# Patient Record
Sex: MALE | Race: BLACK OR AFRICAN AMERICAN | Employment: OTHER | URBAN - METROPOLITAN AREA
[De-identification: names, ages, dates, MRNs, and addresses within clinical notes are randomized per-mention and may not be internally consistent; named-entity substitution may affect disease eponyms.]

---

## 2017-02-16 ENCOUNTER — HOSPITAL ENCOUNTER (OUTPATIENT)
Dept: LAB | Age: 62
Discharge: HOME | End: 2017-02-16
Payer: COMMERCIAL

## 2017-02-16 DIAGNOSIS — N40.1 BPH (BENIGN PROSTATIC HYPERTROPHY) WITH URINARY OBSTRUCTION: ICD-10-CM

## 2017-02-16 DIAGNOSIS — R73.03 PREDIABETES: ICD-10-CM

## 2017-02-16 DIAGNOSIS — N13.8 BPH (BENIGN PROSTATIC HYPERTROPHY) WITH URINARY OBSTRUCTION: ICD-10-CM

## 2017-02-16 DIAGNOSIS — I10 ESSENTIAL HYPERTENSION WITH GOAL BLOOD PRESSURE LESS THAN 140/90: ICD-10-CM

## 2017-02-16 LAB
ALBUMIN SERPL BCP-MCNC: 3.5 G/DL (ref 3.4–5)
ALBUMIN/GLOB SERPL: 1.1 {RATIO} (ref 0.8–1.7)
ALP SERPL-CCNC: 91 U/L (ref 45–117)
ALT SERPL-CCNC: 40 U/L (ref 16–61)
ANION GAP BLD CALC-SCNC: 8 MMOL/L (ref 3–18)
AST SERPL W P-5'-P-CCNC: 26 U/L (ref 15–37)
BILIRUB SERPL-MCNC: 0.3 MG/DL (ref 0.2–1)
BUN SERPL-MCNC: 15 MG/DL (ref 7–18)
BUN/CREAT SERPL: 13 (ref 12–20)
CALCIUM SERPL-MCNC: 9.1 MG/DL (ref 8.5–10.1)
CHLORIDE SERPL-SCNC: 107 MMOL/L (ref 100–108)
CHOLEST SERPL-MCNC: 147 MG/DL
CO2 SERPL-SCNC: 27 MMOL/L (ref 21–32)
CREAT SERPL-MCNC: 1.13 MG/DL (ref 0.6–1.3)
GLOBULIN SER CALC-MCNC: 3.3 G/DL (ref 2–4)
GLUCOSE SERPL-MCNC: 119 MG/DL (ref 74–99)
HBA1C MFR BLD: 5.7 % (ref 4.2–5.6)
HDLC SERPL-MCNC: 51 MG/DL (ref 40–60)
HDLC SERPL: 2.9 {RATIO} (ref 0–5)
LDLC SERPL CALC-MCNC: 76.6 MG/DL (ref 0–100)
LIPID PROFILE,FLP: NORMAL
POTASSIUM SERPL-SCNC: 3.9 MMOL/L (ref 3.5–5.5)
PROT SERPL-MCNC: 6.8 G/DL (ref 6.4–8.2)
PSA SERPL-MCNC: 2.5 NG/ML (ref 0–4)
SODIUM SERPL-SCNC: 142 MMOL/L (ref 136–145)
TRIGL SERPL-MCNC: 97 MG/DL (ref ?–150)
VLDLC SERPL CALC-MCNC: 19.4 MG/DL

## 2017-02-16 PROCEDURE — 84153 ASSAY OF PSA TOTAL: CPT | Performed by: INTERNAL MEDICINE

## 2017-02-16 PROCEDURE — 80053 COMPREHEN METABOLIC PANEL: CPT | Performed by: INTERNAL MEDICINE

## 2017-02-16 PROCEDURE — 80061 LIPID PANEL: CPT | Performed by: INTERNAL MEDICINE

## 2017-02-16 PROCEDURE — 83036 HEMOGLOBIN GLYCOSYLATED A1C: CPT | Performed by: INTERNAL MEDICINE

## 2017-02-16 PROCEDURE — 36415 COLL VENOUS BLD VENIPUNCTURE: CPT | Performed by: INTERNAL MEDICINE

## 2017-02-22 ENCOUNTER — OFFICE VISIT (OUTPATIENT)
Dept: INTERNAL MEDICINE CLINIC | Age: 62
End: 2017-02-22

## 2017-02-22 VITALS
OXYGEN SATURATION: 98 % | HEIGHT: 73 IN | RESPIRATION RATE: 18 BRPM | BODY MASS INDEX: 27.7 KG/M2 | WEIGHT: 209 LBS | DIASTOLIC BLOOD PRESSURE: 80 MMHG | TEMPERATURE: 98.2 F | SYSTOLIC BLOOD PRESSURE: 152 MMHG | HEART RATE: 62 BPM

## 2017-02-22 DIAGNOSIS — N13.8 BPH (BENIGN PROSTATIC HYPERTROPHY) WITH URINARY OBSTRUCTION: ICD-10-CM

## 2017-02-22 DIAGNOSIS — R73.9 ELEVATED BLOOD SUGAR: ICD-10-CM

## 2017-02-22 DIAGNOSIS — M25.511 ACUTE PAIN OF RIGHT SHOULDER: ICD-10-CM

## 2017-02-22 DIAGNOSIS — I10 ESSENTIAL HYPERTENSION: Primary | ICD-10-CM

## 2017-02-22 DIAGNOSIS — N40.1 BPH (BENIGN PROSTATIC HYPERTROPHY) WITH URINARY OBSTRUCTION: ICD-10-CM

## 2017-02-22 RX ORDER — FLUTICASONE PROPIONATE 50 MCG
SPRAY, SUSPENSION (ML) NASAL
Qty: 16 G | Refills: 4 | Status: SHIPPED | OUTPATIENT
Start: 2017-02-22 | End: 2017-08-16 | Stop reason: SDUPTHER

## 2017-02-22 NOTE — PROGRESS NOTES
Patient is in the office today for a 6 month follow up. Do you have an Advance Directive no  Do you want more information declined    1. Have you been to the ER, urgent care clinic since your last visit? Hospitalized since your last visit? No    2. Have you seen or consulted any other health care providers outside of the [address] [place] [address] since your last visit?   Include any pap smears or colon screening. [facility]

## 2017-02-22 NOTE — PATIENT INSTRUCTIONS
DASH Diet: Care Instructions  Your Care Instructions  The DASH diet is an eating plan that can help lower your blood pressure. DASH stands for Dietary Approaches to Stop Hypertension. Hypertension is high blood pressure. The DASH diet focuses on eating foods that are high in calcium, potassium, and magnesium. These nutrients can lower blood pressure. The foods that are highest in these nutrients are fruits, vegetables, low-fat dairy products, nuts, seeds, and legumes. But taking calcium, potassium, and magnesium supplements instead of eating foods that are high in those nutrients does not have the same effect. The DASH diet also includes whole grains, fish, and poultry. The DASH diet is one of several lifestyle changes your doctor may recommend to lower your high blood pressure. Your doctor may also want you to decrease the amount of sodium in your diet. Lowering sodium while following the DASH diet can lower blood pressure even further than just the DASH diet alone. Follow-up care is a key part of your treatment and safety. Be sure to make and go to all appointments, and call your doctor if you are having problems. It's also a good idea to know your test results and keep a list of the medicines you take. How can you care for yourself at home? Following the DASH diet  · Eat 4 to 5 servings of fruit each day. A serving is 1 medium-sized piece of fruit, ½ cup chopped or canned fruit, 1/4 cup dried fruit, or 4 ounces (½ cup) of fruit juice. Choose fruit more often than fruit juice. · Eat 4 to 5 servings of vegetables each day. A serving is 1 cup of lettuce or raw leafy vegetables, ½ cup of chopped or cooked vegetables, or 4 ounces (½ cup) of vegetable juice. Choose vegetables more often than vegetable juice. · Get 2 to 3 servings of low-fat and fat-free dairy each day. A serving is 8 ounces of milk, 1 cup of yogurt, or 1 ½ ounces of cheese. · Eat 6 to 8 servings of grains each day.  A serving is 1 slice of bread, 1 ounce of dry cereal, or ½ cup of cooked rice, pasta, or cooked cereal. Try to choose whole-grain products as much as possible. · Limit lean meat, poultry, and fish to 2 servings each day. A serving is 3 ounces, about the size of a deck of cards. · Eat 4 to 5 servings of nuts, seeds, and legumes (cooked dried beans, lentils, and split peas) each week. A serving is 1/3 cup of nuts, 2 tablespoons of seeds, or ½ cup of cooked beans or peas. · Limit fats and oils to 2 to 3 servings each day. A serving is 1 teaspoon of vegetable oil or 2 tablespoons of salad dressing. · Limit sweets and added sugars to 5 servings or less a week. A serving is 1 tablespoon jelly or jam, ½ cup sorbet, or 1 cup of lemonade. · Eat less than 2,300 milligrams (mg) of sodium a day. If you limit your sodium to 1,500 mg a day, you can lower your blood pressure even more. Tips for success  · Start small. Do not try to make dramatic changes to your diet all at once. You might feel that you are missing out on your favorite foods and then be more likely to not follow the plan. Make small changes, and stick with them. Once those changes become habit, add a few more changes. · Try some of the following:  ¨ Make it a goal to eat a fruit or vegetable at every meal and at snacks. This will make it easy to get the recommended amount of fruits and vegetables each day. ¨ Try yogurt topped with fruit and nuts for a snack or healthy dessert. ¨ Add lettuce, tomato, cucumber, and onion to sandwiches. ¨ Combine a ready-made pizza crust with low-fat mozzarella cheese and lots of vegetable toppings. Try using tomatoes, squash, spinach, broccoli, carrots, cauliflower, and onions. ¨ Have a variety of cut-up vegetables with a low-fat dip as an appetizer instead of chips and dip. ¨ Sprinkle sunflower seeds or chopped almonds over salads. Or try adding chopped walnuts or almonds to cooked vegetables. ¨ Try some vegetarian meals using beans and peas. Add garbanzo or kidney beans to salads. Make burritos and tacos with mashed mcrae beans or black beans. Where can you learn more? Go to http://luzmaria-bradly.info/. Enter M462 in the search box to learn more about \"DASH Diet: Care Instructions. \"  Current as of: March 23, 2016  Content Version: 11.1  © 8769-5052 [employer]. Care instructions adapted under license by [employer] (which disclaims liability or warranty for this information). If you have questions about a medical condition or this instruction, always ask your healthcare professional. [place] [address] any warranty or liability for your use of this information. Rotator Cuff: Exercises  Your Care Instructions  Here are some examples of typical rehabilitation exercises for your condition. Start each exercise slowly. Ease off the exercise if you start to have pain. Your doctor or physical therapist will tell you when you can start these exercises and which ones will work best for you. How to do the exercises  Pendulum swing    Note: If you have pain in your back, do not do this exercise. 1. Hold on to a table or the back of a chair with your good arm. Then bend forward a little and let your sore arm hang straight down. This exercise does not use the arm muscles. Rather, use your legs and your hips to create movement that makes your arm swing freely. 2. Use the movement from your hips and legs to guide the slightly swinging arm back and forth like a pendulum (or elephant trunk). Then guide it in circles that start small (about the size of a dinner plate). Make the circles a bit larger each day, as your pain allows. 3. Do this exercise for 5 minutes, 5 to 7 times each day. 4. As you have less pain, try bending over a little farther to do this exercise. This will increase the amount of movement at your shoulder. Posterior stretching exercise    1.  Hold the elbow of your injured arm with your other hand. 2. Use your hand to pull your injured arm gently up and across your body. You will feel a gentle stretch across the back of your injured shoulder. 3. Hold for at least 15 to 30 seconds. Then slowly lower your arm. 4. Repeat 2 to 4 times. Up-the-back stretch    Note: Your doctor or physical therapist may want you to wait to do this stretch until you have regained most of your range of motion and strength. You can do this stretch in different ways. Hold any of these stretches for at least 15 to 30 seconds. Repeat them 2 to 4 times. 1. Put your hand in your back pocket. Let it rest there to stretch your shoulder. 2. With your other hand, hold your injured arm (palm outward) behind your back by the wrist. Pull your arm up gently to stretch your shoulder. 3. Next, put a towel over your other shoulder. Put the hand of your injured arm behind your back. Now hold the back end of the towel. With the other hand, hold the front end of the towel in front of your body. Pull gently on the front end of the towel. This will bring your hand farther up your back to stretch your shoulder. Overhead stretch    1. Standing about an arm's length away, grasp onto a solid surface. You could use a countertop, a doorknob, or the back of a sturdy chair. 2. With your knees slightly bent, bend forward with your arms straight. Lower your upper body, and let your shoulders stretch. 3. As your shoulders are able to stretch farther, you may need to take a step or two backward. 4. Hold for at least 15 to 30 seconds. Then stand up and relax. If you had stepped back during your stretch, step forward so you can keep your hands on the solid surface. 5. Repeat 2 to 4 times. Shoulder flexion (lying down)    Note: To make a wand for this exercise, use a piece of PVC pipe or a broom handle with the broom removed. Make the wand about a foot wider than your shoulders. 1. Lie on your back, holding a wand with both hands.  Your palms should face down as you hold the wand. 2. Keeping your elbows straight, slowly raise your arms over your head. Raise them until you feel a stretch in your shoulders, upper back, and chest.  3. Hold for 15 to 30 seconds. 4. Repeat 2 to 4 times. Shoulder rotation (lying down)    Note: To make a wand for this exercise, use a piece of PVC pipe or a broom handle with the broom removed. Make the wand about a foot wider than your shoulders. 1. Lie on your back. Hold a wand with both hands with your elbows bent and palms up. 2. Keep your elbows close to your body, and move the wand across your body toward the sore arm. 3. Hold for 8 to 12 seconds. 4. Repeat 2 to 4 times. Wall climbing (to the side)    Note: Avoid any movement that is straight to your side, and be careful not to arch your back. Your arm should stay about 30 degrees to the front of your side. 1. Stand with your side to a wall so that your fingers can just touch it at an angle about 30 degrees toward the front of your body. 2. Walk the fingers of your injured arm up the wall as high as pain permits. Try not to shrug your shoulder up toward your ear as you move your arm up. 3. Hold that position for a count of at least 15 to 20.  4. Walk your fingers back down to the starting position. 5. Repeat at least 2 to 4 times. Try to reach higher each time. Wall climbing (to the front)    Note: During this stretching exercise, be careful not to arch your back. 1. Face a wall, and stand so your fingers can just touch it. 2. Keeping your shoulder down, walk the fingers of your injured arm up the wall as high as pain permits. (Don't shrug your shoulder up toward your ear.)  3. Hold your arm in that position for at least 15 to 30 seconds. 4. Slowly walk your fingers back down to where you started. 5. Repeat at least 2 to 4 times. Try to reach higher each time. Shoulder blade squeeze    1.  Stand with your arms at your sides, and squeeze your shoulder blades together. Do not raise your shoulders up as you squeeze. 2. Hold 6 seconds. 3. Repeat 8 to 12 times. Scapular exercise: Arm reach    1. Lie flat on your back. This exercise is a very slight motion that starts with your arms raised (elbows straight, arms straight). 2. From this position, reach higher toward the kerline or ceiling. Keep your elbows straight. All motion should be from your shoulder blade only. 3. Relax your arms back to where you started. 4. Repeat 8 to 12 times. Arm raise to the side    Note: During this strengthening exercise, your arm should stay about 30 degrees to the front of your side. 1. Slowly raise your injured arm to the side, with your thumb facing up. Raise your arm 60 degrees at the most (shoulder level is 90 degrees). 2. Hold the position for 3 to 5 seconds. Then lower your arm back to your side. If you need to, bring your \"good\" arm across your body and place it under the elbow as you lower your injured arm. Use your good arm to keep your injured arm from dropping down too fast.  3. Repeat 8 to 12 times. 4. When you first start out, don't hold any extra weight in your hand. As you get stronger, you may use a 1-pound to 2-pound dumbbell or a small can of food. Shoulder flexor and extensor exercise    Note: These are isometric exercises. That means you contract your muscles without actually moving. · Push forward (flex): Stand facing a wall or doorjamb, about 6 inches or less back. Hold your injured arm against your body. Make a closed fist with your thumb on top. Then gently push your hand forward into the wall with about 25% to 50% of your strength. Don't let your body move backward as you push. Hold for about 6 seconds. Relax for a few seconds. Repeat 8 to 12 times. · Push backward (extend): Stand with your back flat against a wall. Your upper arm should be against the wall, with your elbow bent 90 degrees (your hand straight ahead).  Push your elbow gently back against the wall with about 25% to 50% of your strength. Don't let your body move forward as you push. Hold for about 6 seconds. Relax for a few seconds. Repeat 8 to 12 times. Scapular exercise: Wall push-ups    Note: This exercise is best done with your fingers somewhat turned out, rather than straight up and down. 1. Stand facing a wall, about 12 inches to 18 inches away. 2. Place your hands on the wall at shoulder height. 3. Slowly bend your elbows and bring your face to the wall. Keep your back and hips straight. 4. Push back to where you started. 5. Repeat 8 to 12 times. 6. When you can do this exercise against a wall comfortably, you can try it against a counter. You can then slowly progress to the end of a couch, then to a sturdy chair, and finally to the floor. Scapular exercise: Retraction    Note: For this exercise, you will need elastic exercise material, such as surgical tubing or Thera-Band. 1. Put the band around a solid object at about waist level. (A bedpost will work well.) Each hand should hold an end of the band. 2. With your elbows at your sides and bent to 90 degrees, pull the band back. Your shoulder blades should move toward each other. Then move your arms back where you started. 3. Repeat 8 to 12 times. 4. If you have good range of motion in your shoulders, try this exercise with your arms lifted out to the sides. Keep your elbows at a 90-degree angle. Raise the elastic band up to about shoulder level. Pull the band back to move your shoulder blades toward each other. Then move your arms back where you started. Internal rotator strengthening exercise    1. Start by tying a piece of elastic exercise material to a doorknob. You can use surgical tubing or Thera-Band. 2. Stand or sit with your shoulder relaxed and your elbow bent 90 degrees. Your upper arm should rest comfortably against your side. Squeeze a rolled towel between your elbow and your body for comfort.  This will help keep your arm at your side. 3. Hold one end of the elastic band in the hand of the painful arm. 4. Slowly rotate your forearm toward your body until it touches your belly. Slowly move it back to where you started. 5. Keep your elbow and upper arm firmly tucked against the towel roll or at your side. 6. Repeat 8 to 12 times. External rotator strengthening exercise    1. Start by tying a piece of elastic exercise material to a doorknob. You can use surgical tubing or Thera-Band. (You may also hold one end of the band in each hand.)  2. Stand or sit with your shoulder relaxed and your elbow bent 90 degrees. Your upper arm should rest comfortably against your side. Squeeze a rolled towel between your elbow and your body for comfort. This will help keep your arm at your side. 3. Hold one end of the elastic band with the hand of the painful arm. 4. Start with your forearm across your belly. Slowly rotate the forearm out away from your body. Keep your elbow and upper arm tucked against the towel roll or the side of your body until you begin to feel tightness in your shoulder. Slowly move your arm back to where you started. 5. Repeat 8 to 12 times. Follow-up care is a key part of your treatment and safety. Be sure to make and go to all appointments, and call your doctor if you are having problems. It's also a good idea to know your test results and keep a list of the medicines you take. Where can you learn more? Go to http://luzmaria-bradly.info/. Enter Adriana Luciano in the search box to learn more about \"Rotator Cuff: Exercises. \"  Current as of: May 23, 2016  Content Version: 11.1  © 1233-7058 [employer]. Care instructions adapted under license by [employer] (which disclaims liability or warranty for this information).  If you have questions about a medical condition or this instruction, always ask your healthcare professional. [employer] disclaims any warranty or liability for your use of this information.

## 2017-02-22 NOTE — PROGRESS NOTES
Kinjal Mir Sr is a 64 y.o.  male and presents with Hypertension and Blood sugar problem (f/u)      SUBJECTIVE:  Pt's  BP is well controlled on on Cardizem. He denies any fatigue. He is followed by Cardiology for his Afib. Pt's BPH and LUTS are fairly well controlled on Uroxatral. Pt is followed by urology. Pt is followed by GI for his Chron's disease. Pt continues to try and cut back the sweats and carbs in his diet to improve his prediabetes     Shoulder Pain  Patient complains of right side shoulder pain. The symptoms began 1 week ago Course of symptoms since onset has been symptoms have progressed to a point and plateaued. . Pain is described as worse at night. Symptoms were incited by repetitive activity, pt does a lot of repetitive work with his hands for his job . Patient denies fever. Therapy to date includes none. Respiratory ROS: negative for - shortness of breath  Cardiovascular ROS: negative for - chest pain    Current Outpatient Prescriptions   Medication Sig    fluticasone (FLONASE) 50 mcg/actuation nasal spray INSERT TWO SPRAYS IN EACH NOSTRIL EVERY DAY    alfuzosin SR (UROXATRAL) 10 mg SR tablet TAKE ONE TABLET BY MOUTH ONCE DAILY    aspirin 81 mg chewable tablet 81 mg.    diltiazem CD (CARDIZEM CD) 240 mg ER capsule Take 240 mg by mouth daily.  FLECAINIDE ACETATE (FLECAINIDE PO) Take  by mouth.  mesalamine EC (ASACOL) 400 mg EC tablet Take 400 mg by mouth three (3) times daily. No current facility-administered medications for this visit.           OBJECTIVE:  alert, well appearing, and in no distress  Visit Vitals    /80 (BP 1 Location: Left arm, BP Patient Position: Sitting)    Pulse 62    Temp 98.2 °F (36.8 °C) (Oral)    Resp 18    Ht 6' 1\" (1.854 m)    Wt 209 lb (94.8 kg)    SpO2 98%    BMI 27.57 kg/m2      well developed and well nourished  Chest - clear to auscultation, no wheezes, rales or rhonchi, symmetric air entry  Heart - normal rate, regular rhythm, normal S1, S2, no murmurs, rubs, clicks or gallops  Extremities - peripheral pulses normal, no pedal edema, no clubbing or cyanosis    Labs:   Lab Results   Component Value Date/Time    Cholesterol, total 147 02/16/2017 09:14 AM    HDL Cholesterol 51 02/16/2017 09:14 AM    LDL, calculated 76.6 02/16/2017 09:14 AM    Triglyceride 97 02/16/2017 09:14 AM    CHOL/HDL Ratio 2.9 02/16/2017 09:14 AM     Lab Results   Component Value Date/Time    ALT (SGPT) 40 02/16/2017 09:14 AM    AST (SGOT) 26 02/16/2017 09:14 AM    Alk. phosphatase 91 02/16/2017 09:14 AM    Bilirubin, total 0.3 02/16/2017 09:14 AM     Lab Results   Component Value Date/Time    GFR est AA >60 02/16/2017 09:14 AM    GFR est non-AA >60 02/16/2017 09:14 AM    Creatinine 1.13 02/16/2017 09:14 AM    BUN 15 02/16/2017 09:14 AM    Sodium 142 02/16/2017 09:14 AM    Potassium 3.9 02/16/2017 09:14 AM    Chloride 107 02/16/2017 09:14 AM    CO2 27 02/16/2017 09:14 AM      Lab Results   Component Value Date/Time    Prostate Specific Ag 2.5 02/16/2017 09:14 AM    Prostate Specific Ag 2.5 02/18/2016 03:00 PM    Prostate Specific Ag 2.1 02/04/2015 08:53 AM    Prostate Specific Ag 2.0 01/27/2014 08:56 AM    Prostate Specific Ag 1.4 01/14/2013 09:46 AM    Prostate Specific Ag 0.9 10/23/2009 09:12 AM     Lab Results   Component Value Date/Time    Glucose 119 02/16/2017 09:14 AM      Labs:   Lab Results   Component Value Date/Time    Hemoglobin A1c 5.7 02/16/2017 09:14 AM    Hemoglobin A1c 5.9 08/17/2016 09:24 AM    Glucose 119 02/16/2017 09:14 AM    LDL, calculated 76.6 02/16/2017 09:14 AM    Creatinine 1.13 02/16/2017 09:14 AM          Assessment/Plan      ICD-10-CM ICD-9-CM    1. Essential hypertension I10 401.9 Well controlled on Cardizem LIPID PANEL      HEMOGLOBIN A1C W/O EAG   2. Elevated blood sugar R73.9 790.29 Controlled with diet currently METABOLIC PANEL, COMPREHENSIVE   3.  BPH (benign prostatic hypertrophy) with urinary obstruction N40.1 600.01 Stable on Uroxatral. Pt will f/u with Urology for rising PSA     N13.8 599.69    4. Acute pain of right shoulder M25.511 719.41 Possible rotator cuff strain. Pt given list of exercises to try. Follow-up Disposition:  Return in about 6 months (around 8/22/2017) for labs 1 week before. Reviewed plan of care. Patient has provided input and agrees with goals.

## 2017-04-10 ENCOUNTER — OFFICE VISIT (OUTPATIENT)
Dept: UROLOGY | Age: 62
End: 2017-04-10

## 2017-04-10 VITALS
DIASTOLIC BLOOD PRESSURE: 81 MMHG | TEMPERATURE: 97.8 F | WEIGHT: 206 LBS | HEIGHT: 73 IN | SYSTOLIC BLOOD PRESSURE: 153 MMHG | HEART RATE: 56 BPM | OXYGEN SATURATION: 99 % | BODY MASS INDEX: 27.3 KG/M2

## 2017-04-10 DIAGNOSIS — N40.1 BPH (BENIGN PROSTATIC HYPERTROPHY) WITH URINARY OBSTRUCTION: Primary | ICD-10-CM

## 2017-04-10 DIAGNOSIS — N13.8 BPH (BENIGN PROSTATIC HYPERTROPHY) WITH URINARY OBSTRUCTION: Primary | ICD-10-CM

## 2017-04-10 LAB
BILIRUB UR QL STRIP: NEGATIVE
GLUCOSE UR-MCNC: NEGATIVE MG/DL
KETONES P FAST UR STRIP-MCNC: NEGATIVE MG/DL
PH UR STRIP: 6 [PH] (ref 4.6–8)
PROT UR QL STRIP: NEGATIVE MG/DL
SP GR UR STRIP: 1.01 (ref 1–1.03)
UA UROBILINOGEN AMB POC: NORMAL (ref 0.2–1)
URINALYSIS CLARITY POC: CLEAR
URINALYSIS COLOR POC: YELLOW
URINE BLOOD POC: NEGATIVE
URINE LEUKOCYTES POC: NEGATIVE
URINE NITRITES POC: NEGATIVE

## 2017-04-10 RX ORDER — ALFUZOSIN HYDROCHLORIDE 10 MG/1
10 TABLET, EXTENDED RELEASE ORAL DAILY
Qty: 90 TAB | Refills: 3 | Status: SHIPPED | OUTPATIENT
Start: 2017-04-10 | End: 2018-02-07 | Stop reason: ALTCHOICE

## 2017-04-10 NOTE — PROGRESS NOTES
Mr. Tamara Oneil has a reminder for a \"due or due soon\" health maintenance. I have asked that he contact his primary care provider for follow-up on this health maintenance.

## 2017-04-10 NOTE — MR AVS SNAPSHOT
Visit Information Date & Time Provider Department Dept. Phone Encounter #  
 4/10/2017  9:15 AM Ada Skinner, [facility] [phone] Your Appointments 8/16/2017  8:10 AM  
LAB with Soheila Martini MD  
[facility] [employer] (--) Appt Note: 6 mo f/u lab [address] [address] [phone]  
  
   
 [address] [address]  
  
    
 8/23/2017  9:15 AM  
ROUTINE CARE with Soheila Martini MD  
[facility] [employer] (--) Appt Note: 6 mo f/u  
 [address] [address] [phone]  
  
   
 [address] [place] [address],[address] [address] Upcoming Health Maintenance Date Due Hepatitis C Screening 1955 DTaP/Tdap/Td series (1 - Tdap) 10/4/1976 ZOSTER VACCINE AGE 60> 10/4/2015 INFLUENZA AGE 9 TO ADULT 8/1/2016 COLONOSCOPY 11/9/2025 Allergies as of 4/10/2017  Review Complete On: 4/10/2017 By: Ada Skinner MD  
  
 Severity Noted Reaction Type Reactions Iron  05/23/2016    Nausea Only Current Immunizations  Reviewed on 12/7/2015 Name Date  
 TB Skin Test (PPD) Intradermal 12/7/2015 Not reviewed this visit You Were Diagnosed With   
  
 Codes Comments BPH (benign prostatic hypertrophy) with urinary obstruction    -  Primary ICD-10-CM: N40.1, N13.8 ICD-9-CM: 600.01, 599.69 Vitals BP Pulse Temp Height(growth percentile) Weight(growth percentile) SpO2  
 153/81 (BP 1 Location: Left arm, BP Patient Position: Sitting) (!) 56 97.8 °F (36.6 °C) 6' 1\" (1.854 m) 206 lb (93.4 kg) 99% BMI Smoking Status 27.18 kg/m2 Former Smoker Vitals History BMI and BSA Data Body Mass Index Body Surface Area  
 27.18 kg/m 2 2.19 m 2 Preferred Pharmacy Pharmacy Name Phone [facility] [address] [place] [address], [address] [place] [address] [phone] Your Updated Medication List  
  
   
This list is accurate as of: 4/10/17  9:37 AM.  Always use your most recent med list.  
  
  
  
  
 * alfuzosin SR 10 mg SR tablet Commonly known as:  UROXATRAL  
TAKE ONE TABLET BY MOUTH ONCE DAILY  
  
 * alfuzosin SR 10 mg SR tablet Commonly known as:  Juan Pih Take 1 Tab by mouth daily. Indications: SYMPTOMATIC BENIGN PROSTATIC HYPERPLASIA ASACOL 400 mg EC tablet Generic drug:  mesalamine EC Take 400 mg by mouth three (3) times daily. aspirin 81 mg chewable tablet 81 mg.  
  
 dilTIAZem  mg ER capsule Commonly known as:  CARDIZEM CD Take 240 mg by mouth daily. FLECAINIDE PO Take  by mouth. fluticasone 50 mcg/actuation nasal spray Commonly known as:  Raissa Jasso INSERT TWO SPRAYS IN EACH NOSTRIL EVERY DAY  
  
 * Notice: This list has 2 medication(s) that are the same as other medications prescribed for you. Read the directions carefully, and ask your doctor or other care provider to review them with you. Prescriptions Sent to Pharmacy Refills  
 alfuzosin SR (UROXATRAL) 10 mg SR tablet 3 Sig: Take 1 Tab by mouth daily. Indications: SYMPTOMATIC BENIGN PROSTATIC HYPERPLASIA Class: Normal  
 Pharmacy: [id number] [facility]. .,[address] [place] [address] #: [phone] Route: Oral  
  
We Performed the Following AMB POC URINALYSIS DIP STICK AUTO W/O MICRO [75262 CPT(R)] Patient Instructions [facility] Activation Thank you for requesting access to [facility]. Please follow the instructions below to securely access and download your online medical record. [facility] allows you to send messages to your doctor, view your test results, renew your prescriptions, schedule appointments, and more. How Do I Sign Up? 1. In your internet browser, go to [email or web address]. 2. Click on the First Time User? Click Here link in the Sign In box.  You will see the New Member Sign Up page. 3. Enter your [employer] Access Code exactly as it appears below. You will not need to use this code after youve completed the sign-up process. If you do not sign up before the expiration date, you must request a new code. [employer] Access Code: [email or web address] Expires: 2017  9:06 AM (This is the date your [employer] access code will ) 4. Enter the last four digits of your Social Security Number (xxxx) and Date of Birth (mm/dd/yyyy) as indicated and click Submit. You will be taken to the next sign-up page. 5. Create a [employer] ID. This will be your [employer] login ID and cannot be changed, so think of one that is secure and easy to remember. 6. Create a [employer] password. You can change your password at any time. 7. Enter your Password Reset Question and Answer. This can be used at a later time if you forget your password. 8. Enter your e-mail address. You will receive e-mail notification when new information is available in [address] 9. Click Sign Up. You can now view and download portions of your medical record. 10. Click the Download Summary menu link to download a portable copy of your medical information. Additional Information If you have questions, please visit the Frequently Asked Questions section of the [employer] website at [email or web address] Remember, [employer] is NOT to be used for urgent needs. For medical emergencies, dial 911. Benign Prostatic Hyperplasia: Care Instructions Your Care Instructions Benign prostatic hyperplasia, or BPH, is an enlarged prostate gland. The prostate is a small gland that makes some of the fluid in semen. Prostate enlargement happens to almost all men as they age. It is usually not serious. BPH does not cause prostate cancer. As the prostate gets bigger, it may partly block the flow of urine. You may have a hard time getting a urine stream started or completely stopped. BPH can cause dribbling. You may have a weak urine stream, or you may have to urinate more often than you used to, especially at night. Most men find these problems easy to manage. You do not need treatment unless your symptoms bother you a lot or you have other problems, such as bladder infections or stones. In these cases, medicines may help. Surgery is not needed unless the urine flow is blocked or the symptoms do not get better with medicine. Follow-up care is a key part of your treatment and safety. Be sure to make and go to all appointments, and call your doctor if you are having problems. It's also a good idea to know your test results and keep a list of the medicines you take. How can you care for yourself at home? · Take plenty of time to urinate. Try to relax. · Try \"double voiding. \" Urinate as much you can, relax for a few moments, and then try to urinate again. · Sit on the toilet to urinate. · Read or think of other things while you are waiting. · Turn on a faucet, or try to picture running water. Some men find that this helps get their urine flowing. · If dribbling is a problem, wash your penis daily to avoid skin irritation and infection. · Avoid caffeine and alcohol. These drinks will increase how often you need to urinate. Spread your fluid intake throughout the day. If the urge to urinate often wakes you at night, limit your fluid intake in the evening. Urinate right before you go to bed. · Many over-the-counter cold and allergy medicines can make the symptoms of BPH worse. Avoid antihistamines, decongestants, and allergy pills, if you can. Read the warnings on the package. · If you take any prescription medicines, especially tranquilizers or antidepressants, ask your doctor or pharmacist whether they can cause urination problems. There may be other medicines you can use that do not cause urinary problems. · Be safe with medicines. Take your medicines exactly as prescribed.  Call your doctor if you think you are having a problem with your medicine. When should you call for help? Call your doctor now or seek immediate medical care if: 
· You cannot urinate at all. · You have symptoms of a urinary infection. For example: ¨ You have blood or pus in your urine. ¨ You have pain in your back just below your rib cage. This is called flank pain. ¨ You have a fever, chills, or body aches. ¨ It hurts to urinate. ¨ You have groin or belly pain. Watch closely for changes in your health, and be sure to contact your doctor if: 
· It hurts when you ejaculate. · Your urinary problems get a lot worse or bother you a lot. Where can you learn more? Go to http://luzmaria-bradly.info/. Enter F271 in the search box to learn more about \"Benign Prostatic Hyperplasia: Care Instructions. \" Current as of: May 24, 2016 Content Version: 11.2 © 8208-5615 ABPathfinder. Care instructions adapted under license by [employer]ett[employer] (which disclaims liability or warranty for this information). If you have questions about a medical condition or this instruction, always ask your healthcare professional. [place] [address] any warranty or liability for your use of this information. Introducing [facility]! Nhan Palmer introduces Milk patient portal. Now you can access parts of your medical record, email your doctor's office, and request medication refills online. 1. In your internet browser, go to [email or web address] 2. Click on the First Time User? Click Here link in the Sign In box. You will see the New Member Sign Up page. 3. Enter your Milk Access Code exactly as it appears below. You will not need to use this code after youve completed the sign-up process. If you do not sign up before the expiration date, you must request a new code. · Milk Access Code: [email or web address] Expires: 7/9/2017  9:06 AM 
 
 4. Enter the last four digits of your Social Security Number (xxxx) and Date of Birth (mm/dd/yyyy) as indicated and click Submit. You will be taken to the next sign-up page. 5. Create a Wander ID. This will be your Wander login ID and cannot be changed, so think of one that is secure and easy to remember. 6. Create a Wander password. You can change your password at any time. 7. Enter your Password Reset Question and Answer. This can be used at a later time if you forget your password. 8. Enter your e-mail address. You will receive e-mail notification when new information is available in [address] 9. Click Sign Up. You can now view and download portions of your medical record. 10. Click the Download Summary menu link to download a portable copy of your medical information. If you have questions, please visit the Frequently Asked Questions section of the Wander website. Remember, Wander is NOT to be used for urgent needs. For medical emergencies, dial 911. Now available from your iPhone and Android! Please provide this summary of care documentation to your next provider. Your primary care clinician is listed as YASMANI MORALES. If you have any questions after today's visit, please call [phone].

## 2017-04-10 NOTE — PROGRESS NOTES
[facility] 64 y.o. male     Mr. Mary Reynolds seen today for follow-up symptomatic BPH responding favorably to alpha-blocker-Uroxatrol 10 mg daily  pt is voiding with a forceful stream good control nocturia 0-1 episodes per night  Patient has no complaints regarding urination at this time-left scrotal pain has not been bothersome during the past year    Patient has a history going back 30 years of recurrent left epididymitis hospitalized for 2 months at age 25 further epididymitis-10% PA disability secondary to chronic epididymitis or  Patient has experienced no symptoms improvement on alpha-blocker therapy with Uroxatral 10 mg daily      PSA 2.5  in February 2016  PSA 2.5 in February 2017        Review of Systems:    CNS: Procedure syncope headaches or dizziness no visual changes  Respiratory: No wheezing or shortness of breath  Cardiovascular:No chest pain or palpitations/hypertension  Intestinal:Chronic constipation no dyspepsia no diarrhea  Urinary: Obstructive and irritative voiding symptoms-Chronic left epididymitis  Skeletal: Chronic low back pain  Endocrine:No diabetes or thyroid disease  Other:    Allergies: Allergies   Allergen Reactions    Iron Nausea Only      Medications:    Current Outpatient Prescriptions   Medication Sig Dispense Refill    alfuzosin SR (UROXATRAL) 10 mg SR tablet Take 1 Tab by mouth daily. Indications: SYMPTOMATIC BENIGN PROSTATIC HYPERPLASIA 90 Tab 3    fluticasone (FLONASE) 50 mcg/actuation nasal spray INSERT TWO SPRAYS IN EACH NOSTRIL EVERY DAY 16 g 4    alfuzosin SR (UROXATRAL) 10 mg SR tablet TAKE ONE TABLET BY MOUTH ONCE DAILY 30 Tab 5    aspirin 81 mg chewable tablet 81 mg.      diltiazem CD (CARDIZEM CD) 240 mg ER capsule Take 240 mg by mouth daily.  FLECAINIDE ACETATE (FLECAINIDE PO) Take  by mouth.  mesalamine EC (ASACOL) 400 mg EC tablet Take 400 mg by mouth three (3) times daily.          Past Medical History:   Diagnosis Date    A-fib [facility])  BPH (benign prostatic hypertrophy) with urinary obstruction 7/16/2010    Crohn's disease ([facility] [address]     Crohn's disease of the colon 6/2/2011    Dyspnea     ED (erectile dysfunction) 5/20/2010    Hypertension       History reviewed. No pertinent surgical history. History reviewed. No pertinent family history. Physical Examination: Well-nourished mature male in no apparent distress    Prostate by WILLIAM is large rounded smooth benign consistency nontender-no induration no nodularity  No rectal masses induration or tenderness    Urinalysis: Negative dipstick/nitrite negative    PVR today 28 cc    Impression: Symptomatic BPH responding favorably to alpha-blocker treatment with Uroxatral      Plan: Uroxatrol 10 mg daily,  #90 refill ×3    rtc 1 yr PSA WILLIAM PVR      Amna Noguera MD  -electronically signed-    PLEASE NOTE:  This document has been produced using voice recognition software. Unrecognized errors in transcription may be present.

## 2017-04-10 NOTE — PATIENT INSTRUCTIONS
[facility] Activation    Thank you for requesting access to [facility]. Please follow the instructions below to securely access and download your online medical record. [facility] allows you to send messages to your doctor, view your test results, renew your prescriptions, schedule appointments, and more. How Do I Sign Up? 1. In your internet browser, go to [email or web address]. 2. Click on the First Time User? Click Here link in the Sign In box. You will see the New Member Sign Up page. 3. Enter your [facility] Access Code exactly as it appears below. You will not need to use this code after youve completed the sign-up process. If you do not sign up before the expiration date, you must request a new code. [facility] Access Code: [email or web address]  Expires: 2017  9:06 AM (This is the date your [facility] access code will )    4. Enter the last four digits of your Social Security Number (xxxx) and Date of Birth (mm/dd/yyyy) as indicated and click Submit. You will be taken to the next sign-up page. 5. Create a [facility] ID. This will be your [facility] login ID and cannot be changed, so think of one that is secure and easy to remember. 6. Create a [facility] password. You can change your password at any time. 7. Enter your Password Reset Question and Answer. This can be used at a later time if you forget your password. 8. Enter your e-mail address. You will receive e-mail notification when new information is available in [address]. 9. Click Sign Up. You can now view and download portions of your medical record. 10. Click the Download Summary menu link to download a portable copy of your medical information. Additional Information    If you have questions, please visit the Frequently Asked Questions section of the [facility] website at [email or web address] Remember, [facility] is NOT to be used for urgent needs. For medical emergencies, dial 911.            Benign Prostatic Hyperplasia: Care Instructions  Your Care Instructions    Benign prostatic hyperplasia, or BPH, is an enlarged prostate gland. The prostate is a small gland that makes some of the fluid in semen. Prostate enlargement happens to almost all men as they age. It is usually not serious. BPH does not cause prostate cancer. As the prostate gets bigger, it may partly block the flow of urine. You may have a hard time getting a urine stream started or completely stopped. BPH can cause dribbling. You may have a weak urine stream, or you may have to urinate more often than you used to, especially at night. Most men find these problems easy to manage. You do not need treatment unless your symptoms bother you a lot or you have other problems, such as bladder infections or stones. In these cases, medicines may help. Surgery is not needed unless the urine flow is blocked or the symptoms do not get better with medicine. Follow-up care is a key part of your treatment and safety. Be sure to make and go to all appointments, and call your doctor if you are having problems. It's also a good idea to know your test results and keep a list of the medicines you take. How can you care for yourself at home? · Take plenty of time to urinate. Try to relax. · Try \"double voiding. \" Urinate as much you can, relax for a few moments, and then try to urinate again. · Sit on the toilet to urinate. · Read or think of other things while you are waiting. · Turn on a faucet, or try to picture running water. Some men find that this helps get their urine flowing. · If dribbling is a problem, wash your penis daily to avoid skin irritation and infection. · Avoid caffeine and alcohol. These drinks will increase how often you need to urinate. Spread your fluid intake throughout the day. If the urge to urinate often wakes you at night, limit your fluid intake in the evening. Urinate right before you go to bed.   · Many over-the-counter cold and allergy medicines can make the symptoms of BPH worse. Avoid antihistamines, decongestants, and allergy pills, if you can. Read the warnings on the package. · If you take any prescription medicines, especially tranquilizers or antidepressants, ask your doctor or pharmacist whether they can cause urination problems. There may be other medicines you can use that do not cause urinary problems. · Be safe with medicines. Take your medicines exactly as prescribed. Call your doctor if you think you are having a problem with your medicine. When should you call for help? Call your doctor now or seek immediate medical care if:  · You cannot urinate at all. · You have symptoms of a urinary infection. For example:  ¨ You have blood or pus in your urine. ¨ You have pain in your back just below your rib cage. This is called flank pain. ¨ You have a fever, chills, or body aches. ¨ It hurts to urinate. ¨ You have groin or belly pain. Watch closely for changes in your health, and be sure to contact your doctor if:  · It hurts when you ejaculate. · Your urinary problems get a lot worse or bother you a lot. Where can you learn more? Go to http://luzmaria-bradly.info/. Enter W664 in the search box to learn more about \"Benign Prostatic Hyperplasia: Care Instructions. \"  Current as of: May 24, 2016  Content Version: 11.2  © 9527-0866 SKINNYprice. Care instructions adapted under license by [employer] Networks (which disclaims liability or warranty for this information). If you have questions about a medical condition or this instruction, always ask your healthcare professional. [place] [address] any warranty or liability for your use of this information.

## 2017-08-16 ENCOUNTER — HOSPITAL ENCOUNTER (OUTPATIENT)
Dept: LAB | Age: 62
Discharge: HOME | End: 2017-08-16
Payer: COMMERCIAL

## 2017-08-16 DIAGNOSIS — I10 ESSENTIAL HYPERTENSION: ICD-10-CM

## 2017-08-16 DIAGNOSIS — R73.9 ELEVATED BLOOD SUGAR: ICD-10-CM

## 2017-08-16 LAB
ALBUMIN SERPL-MCNC: 3.6 G/DL (ref 3.4–5)
ALBUMIN/GLOB SERPL: 1 {RATIO} (ref 0.8–1.7)
ALP SERPL-CCNC: 107 U/L (ref 45–117)
ALT SERPL-CCNC: 41 U/L (ref 16–61)
ANION GAP SERPL CALC-SCNC: 7 MMOL/L (ref 3–18)
AST SERPL-CCNC: 27 U/L (ref 15–37)
BILIRUB SERPL-MCNC: 0.3 MG/DL (ref 0.2–1)
BUN SERPL-MCNC: 10 MG/DL (ref 7–18)
BUN/CREAT SERPL: 10 (ref 12–20)
CALCIUM SERPL-MCNC: 9.6 MG/DL (ref 8.5–10.1)
CHLORIDE SERPL-SCNC: 105 MMOL/L (ref 100–108)
CHOLEST SERPL-MCNC: 153 MG/DL
CO2 SERPL-SCNC: 28 MMOL/L (ref 21–32)
CREAT SERPL-MCNC: 0.97 MG/DL (ref 0.6–1.3)
GLOBULIN SER CALC-MCNC: 3.7 G/DL (ref 2–4)
GLUCOSE SERPL-MCNC: 92 MG/DL (ref 74–99)
HBA1C MFR BLD: 6.2 % (ref 4.2–5.6)
HDLC SERPL-MCNC: 48 MG/DL (ref 40–60)
HDLC SERPL: 3.2 {RATIO} (ref 0–5)
LDLC SERPL CALC-MCNC: 72.6 MG/DL (ref 0–100)
LIPID PROFILE,FLP: ABNORMAL
POTASSIUM SERPL-SCNC: 3.9 MMOL/L (ref 3.5–5.5)
PROT SERPL-MCNC: 7.3 G/DL (ref 6.4–8.2)
SODIUM SERPL-SCNC: 140 MMOL/L (ref 136–145)
TRIGL SERPL-MCNC: 162 MG/DL (ref ?–150)
VLDLC SERPL CALC-MCNC: 32.4 MG/DL

## 2017-08-16 PROCEDURE — 80061 LIPID PANEL: CPT | Performed by: INTERNAL MEDICINE

## 2017-08-16 PROCEDURE — 83036 HEMOGLOBIN GLYCOSYLATED A1C: CPT | Performed by: INTERNAL MEDICINE

## 2017-08-16 PROCEDURE — 36415 COLL VENOUS BLD VENIPUNCTURE: CPT | Performed by: INTERNAL MEDICINE

## 2017-08-16 PROCEDURE — 80053 COMPREHEN METABOLIC PANEL: CPT | Performed by: INTERNAL MEDICINE

## 2017-08-16 RX ORDER — FLUTICASONE PROPIONATE 50 MCG
SPRAY, SUSPENSION (ML) NASAL
Qty: 16 G | Refills: 4 | Status: SHIPPED | OUTPATIENT
Start: 2017-08-16 | End: 2018-02-07 | Stop reason: SDUPTHER

## 2017-08-16 NOTE — TELEPHONE ENCOUNTER
Requested Prescriptions     Pending Prescriptions Disp Refills    fluticasone (FLONASE) 50 mcg/actuation nasal spray 16 g 4     Sig: INSERT TWO SPRAYS IN EACH NOSTRIL EVERY DAY        Request return call when processed

## 2017-08-30 ENCOUNTER — OFFICE VISIT (OUTPATIENT)
Dept: INTERNAL MEDICINE CLINIC | Age: 62
End: 2017-08-30

## 2017-08-30 VITALS
WEIGHT: 207.5 LBS | DIASTOLIC BLOOD PRESSURE: 84 MMHG | SYSTOLIC BLOOD PRESSURE: 150 MMHG | BODY MASS INDEX: 27.5 KG/M2 | HEIGHT: 73 IN | TEMPERATURE: 98.2 F | OXYGEN SATURATION: 99 % | HEART RATE: 60 BPM | RESPIRATION RATE: 16 BRPM

## 2017-08-30 DIAGNOSIS — I48.20 CHRONIC ATRIAL FIBRILLATION (HCC): ICD-10-CM

## 2017-08-30 DIAGNOSIS — R73.03 PREDIABETES: ICD-10-CM

## 2017-08-30 DIAGNOSIS — Z11.59 NEED FOR HEPATITIS C SCREENING TEST: ICD-10-CM

## 2017-08-30 DIAGNOSIS — I10 ESSENTIAL HYPERTENSION: Primary | ICD-10-CM

## 2017-08-30 RX ORDER — DILTIAZEM HYDROCHLORIDE 180 MG/1
CAPSULE, EXTENDED RELEASE ORAL
COMMUNITY
Start: 2017-08-27 | End: 2018-08-08 | Stop reason: DRUGHIGH

## 2017-08-30 RX ORDER — FLECAINIDE ACETATE 50 MG/1
50 TABLET ORAL 2 TIMES DAILY
COMMUNITY

## 2017-08-30 NOTE — PATIENT INSTRUCTIONS

## 2017-08-30 NOTE — MR AVS SNAPSHOT
Visit Information Date & Time Provider Department Dept. Phone Encounter #  
 8/30/2017  9:00 AM Zhanna Bautista MD [facility] [employer] [phone] Follow-up Instructions Return in about 6 months (around 2/28/2018) for labs 1 week before. Your Appointments 4/9/2018  9:15 AM  
ULTRASOUND with Brandon Kowalski MD  
[facility] [address] [facility]) Appt Note: PVR/PSA; .  
 [address] Harpreet A [address] [address]  
[phone] [address] [place] [id number] Upcoming Health Maintenance Date Due Hepatitis C Screening 1955 DTaP/Tdap/Td series (1 - Tdap) 10/4/1976 ZOSTER VACCINE AGE 60> 8/4/2015 INFLUENZA AGE 9 TO ADULT 8/1/2017 COLONOSCOPY 11/9/2025 Allergies as of 8/30/2017  Review Complete On: 8/30/2017 By: Zhanna Bautista MD  
  
 Severity Noted Reaction Type Reactions Iron  05/23/2016    Nausea Only Current Immunizations  Reviewed on 12/7/2015 Name Date  
 TB Skin Test (PPD) Intradermal 12/7/2015 Not reviewed this visit You Were Diagnosed With   
  
 Codes Comments Essential hypertension    -  Primary ICD-10-CM: I10 
ICD-9-CM: 401.9 Chronic atrial fibrillation (HCC)     ICD-10-CM: Q35.7 ICD-9-CM: 427.31 Prediabetes     ICD-10-CM: R73.03 
ICD-9-CM: 790.29 Need for hepatitis C screening test     ICD-10-CM: Z11.59 
ICD-9-CM: V73.89 Vitals BP Pulse Temp Resp Height(growth percentile) Weight(growth percentile) 150/84 (BP 1 Location: Left arm, BP Patient Position: Sitting) 60 98.2 °F (36.8 °C) (Oral) 16 6' 1\" (1.854 m) 207 lb 8 oz (94.1 kg) SpO2 BMI Smoking Status 99% 27.38 kg/m2 Former Smoker Vitals History BMI and BSA Data Body Mass Index Body Surface Area  
 27.38 kg/m 2 2.2 m 2 Preferred Pharmacy Pharmacy Name Phone [facility] [address], [address] [place] [address] [phone] Your Updated Medication List  
  
   
This list is accurate as of: 8/30/17  9:20 AM.  Always use your most recent med list.  
  
  
  
  
 * alfuzosin SR 10 mg SR tablet Commonly known as:  UROXATRAL  
TAKE ONE TABLET BY MOUTH ONCE DAILY  
  
 * alfuzosin SR 10 mg SR tablet Commonly known as:  Ryan Keys Take 1 Tab by mouth daily. Indications: SYMPTOMATIC BENIGN PROSTATIC HYPERPLASIA ASACOL 400 mg EC tablet Generic drug:  mesalamine EC Take 400 mg by mouth three (3) times daily. aspirin 81 mg chewable tablet 81 mg. CARTIA  mg ER capsule Generic drug:  dilTIAZem CD  
  
 flecainide 50 mg tablet Commonly known as:  TAMBOCOR 50 mg.  
  
 fluticasone 50 mcg/actuation nasal spray Commonly known as:  [place] [facility] INSERT TWO SPRAYS IN EACH NOSTRIL EVERY DAY  
  
 * Notice: This list has 2 medication(s) that are the same as other medications prescribed for you. Read the directions carefully, and ask your doctor or other care provider to review them with you. Follow-up Instructions Return in about 6 months (around 2/28/2018) for labs 1 week before. To-Do List   
 08/30/2017 Lab:  HEPATITIS C AB Patient Instructions DASH Diet: Care Instructions Your Care Instructions The DASH diet is an eating plan that can help lower your blood pressure. DASH stands for Dietary Approaches to Stop Hypertension. Hypertension is high blood pressure. The DASH diet focuses on eating foods that are high in calcium, potassium, and magnesium. These nutrients can lower blood pressure. The foods that are highest in these nutrients are fruits, vegetables, low-fat dairy products, nuts, seeds, and legumes. But taking calcium, potassium, and magnesium supplements instead of eating foods that are high in those nutrients does not have the same effect.  The DASH diet also includes whole grains, fish, and poultry. The DASH diet is one of several lifestyle changes your doctor may recommend to lower your high blood pressure. Your doctor may also want you to decrease the amount of sodium in your diet. Lowering sodium while following the DASH diet can lower blood pressure even further than just the DASH diet alone. Follow-up care is a key part of your treatment and safety. Be sure to make and go to all appointments, and call your doctor if you are having problems. It's also a good idea to know your test results and keep a list of the medicines you take. How can you care for yourself at home? Following the DASH diet · Eat 4 to 5 servings of fruit each day. A serving is 1 medium-sized piece of fruit, ½ cup chopped or canned fruit, 1/4 cup dried fruit, or 4 ounces (½ cup) of fruit juice. Choose fruit more often than fruit juice. · Eat 4 to 5 servings of vegetables each day. A serving is 1 cup of lettuce or raw leafy vegetables, ½ cup of chopped or cooked vegetables, or 4 ounces (½ cup) of vegetable juice. Choose vegetables more often than vegetable juice. · Get 2 to 3 servings of low-fat and fat-free dairy each day. A serving is 8 ounces of milk, 1 cup of yogurt, or 1 ½ ounces of cheese. · Eat 6 to 8 servings of grains each day. A serving is 1 slice of bread, 1 ounce of dry cereal, or ½ cup of cooked rice, pasta, or cooked cereal. Try to choose whole-grain products as much as possible. · Limit lean meat, poultry, and fish to 2 servings each day. A serving is 3 ounces, about the size of a deck of cards. · Eat 4 to 5 servings of nuts, seeds, and legumes (cooked dried beans, lentils, and split peas) each week. A serving is 1/3 cup of nuts, 2 tablespoons of seeds, or ½ cup of cooked beans or peas. · Limit fats and oils to 2 to 3 servings each day. A serving is 1 teaspoon of vegetable oil or 2 tablespoons of salad dressing. · Limit sweets and added sugars to 5 servings or less a week.  A serving is 1 tablespoon jelly or jam, ½ cup sorbet, or 1 cup of lemonade. · Eat less than 2,300 milligrams (mg) of sodium a day. If you limit your sodium to 1,500 mg a day, you can lower your blood pressure even more. Tips for success · Start small. Do not try to make dramatic changes to your diet all at once. You might feel that you are missing out on your favorite foods and then be more likely to not follow the plan. Make small changes, and stick with them. Once those changes become habit, add a few more changes. · Try some of the following: ¨ Make it a goal to eat a fruit or vegetable at every meal and at snacks. This will make it easy to get the recommended amount of fruits and vegetables each day. ¨ Try yogurt topped with fruit and nuts for a snack or healthy dessert. ¨ Add lettuce, tomato, cucumber, and onion to sandwiches. ¨ Combine a ready-made pizza crust with low-fat mozzarella cheese and lots of vegetable toppings. Try using tomatoes, squash, spinach, broccoli, carrots, cauliflower, and onions. ¨ Have a variety of cut-up vegetables with a low-fat dip as an appetizer instead of chips and dip. ¨ Sprinkle sunflower seeds or chopped almonds over salads. Or try adding chopped walnuts or almonds to cooked vegetables. ¨ Try some vegetarian meals using beans and peas. Add garbanzo or kidney beans to salads. Make burritos and tacos with mashed mcrae beans or black beans. Where can you learn more? Go to http://luzmaria-bradly.info/. Enter P665 in the search box to learn more about \"DASH Diet: Care Instructions. \" Current as of: April 3, 2017 Content Version: 11.3 © 1603-0559 [employer]. Care instructions adapted under license by [employer] (which disclaims liability or warranty for this information).  If you have questions about a medical condition or this instruction, always ask your healthcare professional. [employer] disclaims any warranty or liability for your use of this information. Introducing [facility] & HE[facility]! [facility] introduces [email or web address] patient portal. Now you can access parts of your medical record, email your doctor's office, and request medication refills online. 1. In your internet browser, go to [email or web address]. [email or web address] 2. Click on the First Time User? Click Here link in the Sign In box. You will see the New Member Sign Up page. 3. Enter your [email or web address] Access Code exactly as it appears below. You will not need to use this code after youve completed the sign-up process. If you do not sign up before the expiration date, you must request a new code. · [email or web address] Access Code: [email or web address] Expires: 11/28/2017  8:59 AM 
 
4. Enter the last four digits of your Social Security Number (xxxx) and Date of Birth (mm/dd/yyyy) as indicated and click Submit. You will be taken to the next sign-up page. 5. Create a [email or web address] ID. This will be your [email or web address] login ID and cannot be changed, so think of one that is secure and easy to remember. 6. Create a [email or web address] password. You can change your password at any time. 7. Enter your Password Reset Question and Answer. This can be used at a later time if you forget your password. 8. Enter your e-mail address. You will receive e-mail notification when new information is available in [address] 9. Click Sign Up. You can now view and download portions of your medical record. 10. Click the Download Summary menu link to download a portable copy of your medical information. If you have questions, please visit the Frequently Asked Questions section of the [email or web address] website. Remember, [email or web address] is NOT to be used for urgent needs. For medical emergencies, dial 911. Now available from your iPhone and Android! Please provide this summary of care documentation to your next provider. Your primary care clinician is listed as YASMANI MORALES. If you have any questions after today's visit, please call [phone].

## 2017-08-30 NOTE — PROGRESS NOTES
Patient is in the office today for a 6 month follow up. Patient states he needs a refill on Asocol. 1. Have you been to the ER, urgent care clinic since your last visit? Hospitalized since your last visit? No    2. Have you seen or consulted any other health care providers outside of the [address] [place] [address] since your last visit? Include any pap smears or colon screening. Yes, [facility].

## 2017-09-01 NOTE — PROGRESS NOTES
Brooke Backbone Sr is a 64 y.o.  male and presents with Hypertension (f/u) and Blood sugar problem      SUBJECTIVE:  Pt's  BP is borderline controlled on on Cardizem 180 mg. He denies any fatigue. He is followed by Cardiology for his Afib and they will increase Cardizem CD at next OV if needed. Pt's BPH and LUTS are fairly well controlled on Uroxatral. Pt is followed by urology. Pt is followed by GI for his Chron's disease. Pt continues to try and cut back the sweats and carbs in his diet to improve his prediabetes       Respiratory ROS: negative for - shortness of breath  Cardiovascular ROS: negative for - chest pain    Current Outpatient Prescriptions   Medication Sig    CARTIA  mg ER capsule     flecainide (TAMBOCOR) 50 mg tablet 50 mg.    alfuzosin SR (UROXATRAL) 10 mg SR tablet Take 1 Tab by mouth daily. Indications: SYMPTOMATIC BENIGN PROSTATIC HYPERPLASIA    alfuzosin SR (UROXATRAL) 10 mg SR tablet TAKE ONE TABLET BY MOUTH ONCE DAILY    mesalamine EC (ASACOL) 400 mg EC tablet Take 400 mg by mouth three (3) times daily.  fluticasone (FLONASE) 50 mcg/actuation nasal spray INSERT TWO SPRAYS IN EACH NOSTRIL EVERY DAY    aspirin 81 mg chewable tablet 81 mg. No current facility-administered medications for this visit.           OBJECTIVE:  alert, well appearing, and in no distress  Visit Vitals    /84 (BP 1 Location: Left arm, BP Patient Position: Sitting)    Pulse 60    Temp 98.2 °F (36.8 °C) (Oral)    Resp 16    Ht 6' 1\" (1.854 m)    Wt 207 lb 8 oz (94.1 kg)    SpO2 99%    BMI 27.38 kg/m2      well developed and well nourished  Chest - clear to auscultation, no wheezes, rales or rhonchi, symmetric air entry  Heart - normal rate, regular rhythm, normal S1, S2, no murmurs, rubs, clicks or gallops  Extremities - peripheral pulses normal, no pedal edema, no clubbing or cyanosis    Labs:   Lab Results   Component Value Date/Time    Cholesterol, total 153 08/16/2017 11:01 AM    HDL Cholesterol 48 08/16/2017 11:01 AM    LDL, calculated 72.6 08/16/2017 11:01 AM    Triglyceride 162 08/16/2017 11:01 AM    CHOL/HDL Ratio 3.2 08/16/2017 11:01 AM     Lab Results   Component Value Date/Time    ALT (SGPT) 41 08/16/2017 11:01 AM    AST (SGOT) 27 08/16/2017 11:01 AM    Alk. phosphatase 107 08/16/2017 11:01 AM    Bilirubin, total 0.3 08/16/2017 11:01 AM     Lab Results   Component Value Date/Time    GFR est AA >60 08/16/2017 11:01 AM    GFR est non-AA >60 08/16/2017 11:01 AM    Creatinine 0.97 08/16/2017 11:01 AM    BUN 10 08/16/2017 11:01 AM    Sodium 140 08/16/2017 11:01 AM    Potassium 3.9 08/16/2017 11:01 AM    Chloride 105 08/16/2017 11:01 AM    CO2 28 08/16/2017 11:01 AM      Lab Results   Component Value Date/Time    Prostate Specific Ag 2.5 02/16/2017 09:14 AM    Prostate Specific Ag 2.5 02/18/2016 03:00 PM    Prostate Specific Ag 2.1 02/04/2015 08:53 AM    Prostate Specific Ag 2.0 01/27/2014 08:56 AM    Prostate Specific Ag 1.4 01/14/2013 09:46 AM    Prostate Specific Ag 0.9 10/23/2009 09:12 AM     Lab Results   Component Value Date/Time    Glucose 92 08/16/2017 11:01 AM      Labs:   Lab Results   Component Value Date/Time    Hemoglobin A1c 6.2 08/16/2017 11:01 AM    Hemoglobin A1c 5.7 02/16/2017 09:14 AM    Hemoglobin A1c 5.9 08/17/2016 09:24 AM    Glucose 92 08/16/2017 11:01 AM    LDL, calculated 72.6 08/16/2017 11:01 AM    Creatinine 0.97 08/16/2017 11:01 AM          Assessment/Plan      ICD-10-CM ICD-9-CM    1. Essential hypertension I10 401.9 Borderline controlled on Cardizem. will continue to try and eat well and be active which he says he does at work. METABOLIC PANEL, COMPREHENSIVE   2. Prediabetes R73.03 790.29 Controlled with diet currently HEMOGLOBIN A1C W/O EAG   3. Chronic atrial fibrillation (HCC) I48.2 427.31 Controlled and followed by cardiology    4.  Need for hepatitis C screening test Z11.59 V73.89 HEPATITIS C AB       Follow-up Disposition:  Return in about 6 months (around 2/28/2018) for labs 1 week before. Reviewed plan of care. Patient has provided input and agrees with goals.

## 2018-01-31 ENCOUNTER — HOSPITAL ENCOUNTER (OUTPATIENT)
Dept: LAB | Age: 63
Discharge: HOME | End: 2018-01-31
Payer: COMMERCIAL

## 2018-01-31 DIAGNOSIS — I10 ESSENTIAL HYPERTENSION: ICD-10-CM

## 2018-01-31 DIAGNOSIS — Z11.59 NEED FOR HEPATITIS C SCREENING TEST: ICD-10-CM

## 2018-01-31 LAB
ALBUMIN SERPL-MCNC: 3.7 G/DL (ref 3.4–5)
ALBUMIN/GLOB SERPL: 1.1 {RATIO} (ref 0.8–1.7)
ALP SERPL-CCNC: 104 U/L (ref 45–117)
ALT SERPL-CCNC: 37 U/L (ref 16–61)
ANION GAP SERPL CALC-SCNC: 9 MMOL/L (ref 3–18)
AST SERPL-CCNC: 22 U/L (ref 15–37)
BILIRUB SERPL-MCNC: 0.4 MG/DL (ref 0.2–1)
BUN SERPL-MCNC: 11 MG/DL (ref 7–18)
BUN/CREAT SERPL: 10 (ref 12–20)
CALCIUM SERPL-MCNC: 9.7 MG/DL (ref 8.5–10.1)
CHLORIDE SERPL-SCNC: 104 MMOL/L (ref 100–108)
CO2 SERPL-SCNC: 26 MMOL/L (ref 21–32)
CREAT SERPL-MCNC: 1.05 MG/DL (ref 0.6–1.3)
GLOBULIN SER CALC-MCNC: 3.4 G/DL (ref 2–4)
GLUCOSE SERPL-MCNC: 85 MG/DL (ref 74–99)
POTASSIUM SERPL-SCNC: 3.7 MMOL/L (ref 3.5–5.5)
PROT SERPL-MCNC: 7.1 G/DL (ref 6.4–8.2)
SODIUM SERPL-SCNC: 139 MMOL/L (ref 136–145)

## 2018-01-31 PROCEDURE — 36415 COLL VENOUS BLD VENIPUNCTURE: CPT | Performed by: INTERNAL MEDICINE

## 2018-01-31 PROCEDURE — 86803 HEPATITIS C AB TEST: CPT | Performed by: INTERNAL MEDICINE

## 2018-01-31 PROCEDURE — 80053 COMPREHEN METABOLIC PANEL: CPT | Performed by: INTERNAL MEDICINE

## 2018-02-01 LAB
HCV AB SER IA-ACNC: 0.05 INDEX
HCV AB SERPL QL IA: NEGATIVE
HCV COMMENT,HCGAC: NORMAL

## 2018-02-07 ENCOUNTER — OFFICE VISIT (OUTPATIENT)
Dept: FAMILY MEDICINE CLINIC | Age: 63
End: 2018-02-07

## 2018-02-07 VITALS
RESPIRATION RATE: 18 BRPM | SYSTOLIC BLOOD PRESSURE: 147 MMHG | BODY MASS INDEX: 28.89 KG/M2 | OXYGEN SATURATION: 96 % | HEART RATE: 62 BPM | WEIGHT: 218 LBS | HEIGHT: 73 IN | TEMPERATURE: 98.4 F | DIASTOLIC BLOOD PRESSURE: 83 MMHG

## 2018-02-07 DIAGNOSIS — R73.9 HIGH BLOOD SUGAR: ICD-10-CM

## 2018-02-07 DIAGNOSIS — I10 ESSENTIAL HYPERTENSION: Primary | ICD-10-CM

## 2018-02-07 DIAGNOSIS — I48.20 CHRONIC ATRIAL FIBRILLATION (HCC): ICD-10-CM

## 2018-02-07 DIAGNOSIS — J30.1 ACUTE SEASONAL ALLERGIC RHINITIS DUE TO POLLEN: ICD-10-CM

## 2018-02-07 RX ORDER — FLUTICASONE PROPIONATE 50 MCG
SPRAY, SUSPENSION (ML) NASAL
Qty: 16 G | Refills: 4 | Status: SHIPPED | OUTPATIENT
Start: 2018-02-07 | End: 2018-08-08 | Stop reason: SDUPTHER

## 2018-02-07 NOTE — MR AVS SNAPSHOT
[address] [facility] 
 
 
 [address], [place] [address] [address] [address] 
[phone] Patient: Tyrese Tapia Sr 
MRN:  [id number] Visit Information Date & Time Provider Department Dept. Phone Encounter #  
 2/7/2018  9:15 AM Ivan Cortes, [address] [place] [address] [phone] [id number] Follow-up Instructions Return in about 6 months (around 8/7/2018) for labs 1 week before. Your Appointments 4/9/2018  9:15 AM  
ULTRASOUND with Dayday Reyes MD  
[facility] Appt Note: PVR/PSA; .  
 [address] Harpreet A [address] [address]  
[phone] [address] 600 [facility] [address] Upcoming Health Maintenance Date Due DTaP/Tdap/Td series (1 - Tdap) 10/4/1976 ZOSTER VACCINE AGE 60> 8/4/2015 Influenza Age 5 to Adult 8/1/2017 COLONOSCOPY 11/9/2025 Allergies as of 2/7/2018  Review Complete On: 2/7/2018 By: Ivan Cortes MD  
  
 Severity Noted Reaction Type Reactions Iron  05/23/2016    Nausea Only Current Immunizations  Reviewed on 12/7/2015 Name Date  
 TB Skin Test (PPD) Intradermal 12/7/2015 Not reviewed this visit You Were Diagnosed With   
  
 Codes Comments Essential hypertension    -  Primary ICD-10-CM: I10 
ICD-9-CM: 401.9 High blood sugar     ICD-10-CM: R73.9 ICD-9-CM: 790.29 Acute seasonal allergic rhinitis due to pollen     ICD-10-CM: J30.1 ICD-9-CM: 477.0 Chronic atrial fibrillation (HCC)     ICD-10-CM: N17.3 ICD-9-CM: 427.31 Vitals BP Pulse Temp Resp Height(growth percentile) Weight(growth percentile) 147/83 (BP 1 Location: Left arm, BP Patient Position: Sitting) 62 98.4 °F (36.9 °C) (Oral) 18 6' 1\" (1.854 m) 218 lb (98.9 kg) SpO2 BMI Smoking Status 96% 28.76 kg/m2 Former Smoker Vitals History BMI and BSA Data Body Mass Index Body Surface Area 28.76 kg/m 2 2.26 m 2 Preferred Pharmacy Pharmacy Name Phone [address] [phone] Your Updated Medication List  
  
   
This list is accurate as of: 2/7/18  9:54 AM.  Always use your most recent med list.  
  
  
  
  
 alfuzosin SR 10 mg SR tablet Commonly known as:  UROXATRAL  
TAKE ONE TABLET BY MOUTH ONCE DAILY ASACOL 400 mg EC tablet Generic drug:  mesalamine EC Take 400 mg by mouth three (3) times daily. aspirin 81 mg chewable tablet 81 mg. CARTIA  mg ER capsule Generic drug:  dilTIAZem CD  
  
 flecainide 50 mg tablet Commonly known as:  TAMBOCOR 50 mg.  
  
 fluticasone 50 mcg/actuation nasal spray Commonly known as:  Jetanisha Clinton INSERT TWO SPRAYS IN EACH NOSTRIL EVERY DAY Prescriptions Sent to Pharmacy Refills  
 fluticasone (FLONASE) 50 mcg/actuation nasal spray 4 Sig: INSERT TWO SPRAYS IN EACH NOSTRIL EVERY DAY Class: Normal  
 Pharmacy: [facility] [address] LILLIANA OCHOA [address] [facility], [address] [place] [address] Ph #: [phone] Follow-up Instructions Return in about 6 months (around 8/7/2018) for labs 1 week before. To-Do List   
 08/07/2018 Lab:  LIPID PANEL   
  
 08/07/2018 Lab:  METABOLIC PANEL, COMPREHENSIVE   
  
 08/09/2018 Lab:  HEMOGLOBIN A1C W/O EAG Patient Instructions High Blood Pressure: Care Instructions Your Care Instructions If your blood pressure is usually above 140/90, you have high blood pressure, or hypertension. That means the top number is 140 or higher or the bottom number is 90 or higher, or both. Despite what a lot of people think, high blood pressure usually doesn't cause headaches or make you feel dizzy or lightheaded. It usually has no symptoms. But it does increase your risk for heart attack, stroke, and kidney or eye damage. The higher your blood pressure, the more your risk increases. Your doctor will give you a goal for your blood pressure. Your goal will be based on your health and your age. An example of a goal is to keep your blood pressure below 140/90. Lifestyle changes, such as eating healthy and being active, are always important to help lower blood pressure. You might also take medicine to reach your blood pressure goal. 
Follow-up care is a key part of your treatment and safety. Be sure to make and go to all appointments, and call your doctor if you are having problems. It's also a good idea to know your test results and keep a list of the medicines you take. How can you care for yourself at home? Medical treatment · If you stop taking your medicine, your blood pressure will go back up. You may take one or more types of medicine to lower your blood pressure. Be safe with medicines. Take your medicine exactly as prescribed. Call your doctor if you think you are having a problem with your medicine. · Talk to your doctor before you start taking aspirin every day. Aspirin can help certain people lower their risk of a heart attack or stroke. But taking aspirin isn't right for everyone, because it can cause serious bleeding. · See your doctor regularly. You may need to see the doctor more often at first or until your blood pressure comes down. · If you are taking blood pressure medicine, talk to your doctor before you take decongestants or anti-inflammatory medicine, such as ibuprofen. Some of these medicines can raise blood pressure. · Learn how to check your blood pressure at home. Lifestyle changes · Stay at a healthy weight. This is especially important if you put on weight around the waist. Losing even 10 pounds can help you lower your blood pressure. · If your doctor recommends it, get more exercise. Walking is a good choice. Bit by bit, increase the amount you walk every day. Try for at least 30 minutes on most days of the week.  You also may want to swim, bike, or do other activities. · Avoid or limit alcohol. Talk to your doctor about whether you can drink any alcohol. · Try to limit how much sodium you eat to less than 2,300 milligrams (mg) a day. Your doctor may ask you to try to eat less than 1,500 mg a day. · Eat plenty of fruits (such as bananas and oranges), vegetables, legumes, whole grains, and low-fat dairy products. · Lower the amount of saturated fat in your diet. Saturated fat is found in animal products such as milk, cheese, and meat. Limiting these foods may help you lose weight and also lower your risk for heart disease. · Do not smoke. Smoking increases your risk for heart attack and stroke. If you need help quitting, talk to your doctor about stop-smoking programs and medicines. These can increase your chances of quitting for good. When should you call for help? Call 911 anytime you think you may need emergency care. This may mean having symptoms that suggest that your blood pressure is causing a serious heart or blood vessel problem. Your blood pressure may be over 180/110. ? For example, call 911 if: 
? · You have symptoms of a heart attack. These may include: ¨ Chest pain or pressure, or a strange feeling in the chest. 
¨ Sweating. ¨ Shortness of breath. ¨ Nausea or vomiting. ¨ Pain, pressure, or a strange feeling in the back, neck, jaw, or upper belly or in one or both shoulders or arms. ¨ Lightheadedness or sudden weakness. ¨ A fast or irregular heartbeat. ? · You have symptoms of a stroke. These may include: 
¨ Sudden numbness, tingling, weakness, or loss of movement in your face, arm, or leg, especially on only one side of your body. ¨ Sudden vision changes. ¨ Sudden trouble speaking. ¨ Sudden confusion or trouble understanding simple statements. ¨ Sudden problems with walking or balance. ¨ A sudden, severe headache that is different from past headaches. ? · You have severe back or belly pain. ?Do not wait until your blood pressure comes down on its own. Get help right away. ?Call your doctor now or seek immediate care if: 
? · Your blood pressure is much higher than normal (such as 180/110 or higher), but you don't have symptoms. ? · You think high blood pressure is causing symptoms, such as: ¨ Severe headache. ¨ Blurry vision. ? Watch closely for changes in your health, and be sure to contact your doctor if: 
? · Your blood pressure measures 140/90 or higher at least 2 times. That means the top number is 140 or higher or the bottom number is 90 or higher, or both. ? · You think you may be having side effects from your blood pressure medicine. ? · Your blood pressure is usually normal, but it goes above normal at least 2 times. Where can you learn more? Go to http://luzmaria-bradly.info/. Enter N232 in the search box to learn more about \"High Blood Pressure: Care Instructions. \" Current as of: September 21, 2016 Content Version: 11.4 © 5328-2041 [employer]. Care instructions adapted under license by [employer] (which disclaims liability or warranty for this information). If you have questions about a medical condition or this instruction, always ask your healthcare professional. [address] any warranty or liability for your use of this information. Introducing [facility]! [facility] introduces Ad Infuse patient portal. Now you can access parts of your medical record, email your doctor's office, and request medication refills online. 1. In your internet browser, go to [email or web address][email or web address] 2. Click on the First Time User? Click Here link in the Sign In box. You will see the New Member Sign Up page. 3. Enter your Ad Infuse Access Code exactly as it appears below. You will not need to use this code after youve completed the sign-up process.  If you do not sign up before the expiration date, you must request a new code. · Atigeo Access Code: [email or web address] Expires: 5/8/2018  9:42 AM 
 
4. Enter the last four digits of your Social Security Number (xxxx) and Date of Birth (mm/dd/yyyy) as indicated and click Submit. You will be taken to the next sign-up page. 5. Create a Atigeo ID. This will be your Atigeo login ID and cannot be changed, so think of one that is secure and easy to remember. 6. Create a Atigeo password. You can change your password at any time. 7. Enter your Password Reset Question and Answer. This can be used at a later time if you forget your password. 8. Enter your e-mail address. You will receive e-mail notification when new information is available in [address] 9. Click Sign Up. You can now view and download portions of your medical record. 10. Click the Download Summary menu link to download a portable copy of your medical information. If you have questions, please visit the Frequently Asked Questions section of the Atigeo website. Remember, Atigeo is NOT to be used for urgent needs. For medical emergencies, dial 911. Now available from your iPhone and Android! Please provide this summary of care documentation to your next provider. Your primary care clinician is listed as YASMANI MORALES. If you have any questions after today's visit, please call [phone].

## 2018-02-07 NOTE — PATIENT INSTRUCTIONS
High Blood Pressure: Care Instructions  Your Care Instructions    If your blood pressure is usually above 140/90, you have high blood pressure, or hypertension. That means the top number is 140 or higher or the bottom number is 90 or higher, or both. Despite what a lot of people think, high blood pressure usually doesn't cause headaches or make you feel dizzy or lightheaded. It usually has no symptoms. But it does increase your risk for heart attack, stroke, and kidney or eye damage. The higher your blood pressure, the more your risk increases. Your doctor will give you a goal for your blood pressure. Your goal will be based on your health and your age. An example of a goal is to keep your blood pressure below 140/90. Lifestyle changes, such as eating healthy and being active, are always important to help lower blood pressure. You might also take medicine to reach your blood pressure goal.  Follow-up care is a key part of your treatment and safety. Be sure to make and go to all appointments, and call your doctor if you are having problems. It's also a good idea to know your test results and keep a list of the medicines you take. How can you care for yourself at home? Medical treatment  · If you stop taking your medicine, your blood pressure will go back up. You may take one or more types of medicine to lower your blood pressure. Be safe with medicines. Take your medicine exactly as prescribed. Call your doctor if you think you are having a problem with your medicine. · Talk to your doctor before you start taking aspirin every day. Aspirin can help certain people lower their risk of a heart attack or stroke. But taking aspirin isn't right for everyone, because it can cause serious bleeding. · See your doctor regularly. You may need to see the doctor more often at first or until your blood pressure comes down.   · If you are taking blood pressure medicine, talk to your doctor before you take decongestants or anti-inflammatory medicine, such as ibuprofen. Some of these medicines can raise blood pressure. · Learn how to check your blood pressure at home. Lifestyle changes  · Stay at a healthy weight. This is especially important if you put on weight around the waist. Losing even 10 pounds can help you lower your blood pressure. · If your doctor recommends it, get more exercise. Walking is a good choice. Bit by bit, increase the amount you walk every day. Try for at least 30 minutes on most days of the week. You also may want to swim, bike, or do other activities. · Avoid or limit alcohol. Talk to your doctor about whether you can drink any alcohol. · Try to limit how much sodium you eat to less than 2,300 milligrams (mg) a day. Your doctor may ask you to try to eat less than 1,500 mg a day. · Eat plenty of fruits (such as bananas and oranges), vegetables, legumes, whole grains, and low-fat dairy products. · Lower the amount of saturated fat in your diet. Saturated fat is found in animal products such as milk, cheese, and meat. Limiting these foods may help you lose weight and also lower your risk for heart disease. · Do not smoke. Smoking increases your risk for heart attack and stroke. If you need help quitting, talk to your doctor about stop-smoking programs and medicines. These can increase your chances of quitting for good. When should you call for help? Call 911 anytime you think you may need emergency care. This may mean having symptoms that suggest that your blood pressure is causing a serious heart or blood vessel problem. Your blood pressure may be over 180/110. ? For example, call 911 if:  ? · You have symptoms of a heart attack. These may include:  ¨ Chest pain or pressure, or a strange feeling in the chest.  ¨ Sweating. ¨ Shortness of breath. ¨ Nausea or vomiting.   ¨ Pain, pressure, or a strange feeling in the back, neck, jaw, or upper belly or in one or both shoulders or arms.  ¨ Lightheadedness or sudden weakness. ¨ A fast or irregular heartbeat. ? · You have symptoms of a stroke. These may include:  ¨ Sudden numbness, tingling, weakness, or loss of movement in your face, arm, or leg, especially on only one side of your body. ¨ Sudden vision changes. ¨ Sudden trouble speaking. ¨ Sudden confusion or trouble understanding simple statements. ¨ Sudden problems with walking or balance. ¨ A sudden, severe headache that is different from past headaches. ? · You have severe back or belly pain. ?Do not wait until your blood pressure comes down on its own. Get help right away. ?Call your doctor now or seek immediate care if:  ? · Your blood pressure is much higher than normal (such as 180/110 or higher), but you don't have symptoms. ? · You think high blood pressure is causing symptoms, such as:  ¨ Severe headache. ¨ Blurry vision. ? Watch closely for changes in your health, and be sure to contact your doctor if:  ? · Your blood pressure measures 140/90 or higher at least 2 times. That means the top number is 140 or higher or the bottom number is 90 or higher, or both. ? · You think you may be having side effects from your blood pressure medicine. ? · Your blood pressure is usually normal, but it goes above normal at least 2 times. Where can you learn more? Go to http://luzmaria-bradly.info/. Enter D725 in the search box to learn more about \"High Blood Pressure: Care Instructions. \"  Current as of: September 21, 2016  Content Version: 11.4  © 2766-5896 [email or web address]. Care instructions adapted under license by [employer] (which disclaims liability or warranty for this information). If you have questions about a medical condition or this instruction, always ask your healthcare professional. [place] [address] any warranty or liability for your use of this information.

## 2018-02-07 NOTE — PROGRESS NOTES
Patient is in the office today for a 6 month follow up. Patient states he needs prescription for Asacol. 1. Have you been to the ER, urgent care clinic since your last visit? Hospitalized since your last visit? No    2. Have you seen or consulted any other health care providers outside of the [facility] since your last visit? Include any pap smears or colon screening.  No

## 2018-02-07 NOTE — PROGRESS NOTES
Get Shannon Sr is a 58 y.o.  male and presents with Cholesterol Problem; Hypertension (f/u); Irregular Heart Beat; and Benign Prostatic Hypertrophy      SUBJECTIVE:  Pt's  BP is borderline controlled on on Cardizem 180 mg. He denies any fatigue. He is followed by Cardiology for his Afib and they will increase Cardizem CD at next OV if needed. Pt's BPH and LUTS are fairly well controlled on Uroxatral. Pt is followed by urology. Pt is followed by GI for his Chron's disease. Pt continues to try and cut back the sweats and carbs in his diet to improve his prediabetes       Respiratory ROS: negative for - shortness of breath  Cardiovascular ROS: negative for - chest pain    Current Outpatient Prescriptions   Medication Sig    fluticasone (FLONASE) 50 mcg/actuation nasal spray INSERT TWO SPRAYS IN EACH NOSTRIL EVERY DAY    CARTIA  mg ER capsule     flecainide (TAMBOCOR) 50 mg tablet 50 mg.    alfuzosin SR (UROXATRAL) 10 mg SR tablet TAKE ONE TABLET BY MOUTH ONCE DAILY    aspirin 81 mg chewable tablet 81 mg.    mesalamine EC (ASACOL) 400 mg EC tablet Take 400 mg by mouth three (3) times daily. No current facility-administered medications for this visit.           OBJECTIVE:  alert, well appearing, and in no distress  Visit Vitals    /83 (BP 1 Location: Left arm, BP Patient Position: Sitting)    Pulse 62    Temp 98.4 °F (36.9 °C) (Oral)    Resp 18    Ht 6' 1\" (1.854 m)    Wt 218 lb (98.9 kg)    SpO2 96%    BMI 28.76 kg/m2      well developed and well nourished  Chest - clear to auscultation, no wheezes, rales or rhonchi, symmetric air entry  Heart - normal rate, regular rhythm, normal S1, S2, no murmurs, rubs, clicks or gallops  Extremities - peripheral pulses normal, no pedal edema, no clubbing or cyanosis    Labs:   Lab Results   Component Value Date/Time    Cholesterol, total 153 08/16/2017 11:01 AM    HDL Cholesterol 48 08/16/2017 11:01 AM    LDL, calculated 72.6 08/16/2017 11:01 AM Triglyceride 162 (H) 08/16/2017 11:01 AM    CHOL/HDL Ratio 3.2 08/16/2017 11:01 AM     Lab Results   Component Value Date/Time    ALT (SGPT) 37 01/31/2018 10:17 AM    AST (SGOT) 22 01/31/2018 10:17 AM    Alk. phosphatase 104 01/31/2018 10:17 AM    Bilirubin, total 0.4 01/31/2018 10:17 AM     Lab Results   Component Value Date/Time    GFR est AA >60 01/31/2018 10:17 AM    GFR est non-AA >60 01/31/2018 10:17 AM    Creatinine 1.05 01/31/2018 10:17 AM    BUN 11 01/31/2018 10:17 AM    Sodium 139 01/31/2018 10:17 AM    Potassium 3.7 01/31/2018 10:17 AM    Chloride 104 01/31/2018 10:17 AM    CO2 26 01/31/2018 10:17 AM      Lab Results   Component Value Date/Time    Prostate Specific Ag 2.5 02/16/2017 09:14 AM    Prostate Specific Ag 2.5 02/18/2016 03:00 PM    Prostate Specific Ag 2.1 02/04/2015 08:53 AM    Prostate Specific Ag 2.0 01/27/2014 08:56 AM    Prostate Specific Ag 1.4 01/14/2013 09:46 AM    Prostate Specific Ag 0.9 10/23/2009 09:12 AM     Lab Results   Component Value Date/Time    Glucose 85 01/31/2018 10:17 AM      Labs:   Lab Results   Component Value Date/Time    Hemoglobin A1c 6.2 (H) 08/16/2017 11:01 AM    Hemoglobin A1c 5.7 (H) 02/16/2017 09:14 AM    Hemoglobin A1c 5.9 (H) 08/17/2016 09:24 AM    Glucose 85 01/31/2018 10:17 AM    LDL, calculated 72.6 08/16/2017 11:01 AM    Creatinine 1.05 01/31/2018 10:17 AM          Assessment/Plan      ICD-10-CM ICD-9-CM    1. Essential hypertension I10 401.9 Borderline controlled. Will try to improve with diet and weight loss LIPID PANEL      METABOLIC PANEL, COMPREHENSIVE      LIPID PANEL      METABOLIC PANEL, COMPREHENSIVE   2. High blood sugar R73.9 790.29 Will try to improve with diet and weight loss HEMOGLOBIN A1C W/O EAG      HEMOGLOBIN A1C W/O EAG   3. Acute seasonal allergic rhinitis due to pollen J30.1 477.0 Stable on Flonase    4. Chronic atrial fibrillation (HCC) I48.2 427.31 Stable on Cardizem and Flecainide.  Pt is followed by Cardiology          Follow-up Disposition:  Return in about 6 months (around 8/7/2018) for labs 1 week before. Reviewed plan of care. Patient has provided input and agrees with goals.

## 2018-02-22 ENCOUNTER — HOSPITAL ENCOUNTER (EMERGENCY)
Age: 63
Discharge: HOME | End: 2018-02-22
Attending: EMERGENCY MEDICINE
Payer: COMMERCIAL

## 2018-02-22 ENCOUNTER — APPOINTMENT (OUTPATIENT)
Dept: GENERAL RADIOLOGY | Age: 63
End: 2018-02-22
Attending: EMERGENCY MEDICINE
Payer: COMMERCIAL

## 2018-02-22 VITALS
WEIGHT: 218 LBS | RESPIRATION RATE: 18 BRPM | SYSTOLIC BLOOD PRESSURE: 144 MMHG | HEART RATE: 73 BPM | TEMPERATURE: 98.7 F | HEIGHT: 72 IN | DIASTOLIC BLOOD PRESSURE: 78 MMHG | BODY MASS INDEX: 29.53 KG/M2 | OXYGEN SATURATION: 100 %

## 2018-02-22 DIAGNOSIS — R09.81 NASAL CONGESTION: ICD-10-CM

## 2018-02-22 DIAGNOSIS — J06.9 ACUTE UPPER RESPIRATORY INFECTION: Primary | ICD-10-CM

## 2018-02-22 DIAGNOSIS — R05.9 COUGH: ICD-10-CM

## 2018-02-22 PROCEDURE — 99283 EMERGENCY DEPT VISIT LOW MDM: CPT

## 2018-02-22 PROCEDURE — 99282 EMERGENCY DEPT VISIT SF MDM: CPT

## 2018-02-22 PROCEDURE — 71046 X-RAY EXAM CHEST 2 VIEWS: CPT

## 2018-02-22 RX ORDER — HYDROCODONE POLISTIREX AND CHLORPHENIRAMINE POLISTIREX 10; 8 MG/5ML; MG/5ML
5 SUSPENSION, EXTENDED RELEASE ORAL
Qty: 60 ML | Refills: 0 | Status: SHIPPED | OUTPATIENT
Start: 2018-02-22 | End: 2018-11-26 | Stop reason: ALTCHOICE

## 2018-02-22 RX ORDER — LORATADINE AND PSEUDOEPHEDRINE 10; 240 MG/1; MG/1
1 TABLET, EXTENDED RELEASE ORAL DAILY
Qty: 10 TAB | Refills: 0 | Status: SHIPPED | OUTPATIENT
Start: 2018-02-22 | End: 2021-01-14

## 2018-02-22 RX ORDER — DOXYCYCLINE HYCLATE 100 MG
100 TABLET ORAL 2 TIMES DAILY
Qty: 14 TAB | Refills: 0 | Status: SHIPPED | OUTPATIENT
Start: 2018-02-22 | End: 2018-03-01

## 2018-02-22 NOTE — ED TRIAGE NOTES
Pt co  Cold x one week states started with productive cough  At that time.  Pt co head and chest congestion

## 2018-02-22 NOTE — ED NOTES
Edmund Tejada Sr is a 58 y.o. male that was discharged in stable condition. The patients diagnosis, condition and treatment were explained to  patient and aftercare instructions were given. The patient verbalized understanding. Patient armband removed and shredded.

## 2018-02-22 NOTE — DISCHARGE INSTRUCTIONS
Drinking warm liquids, gargling with warm salt water, and throat lozenges may help with symptoms. An allergy medication combined with a decongestant (Allegra-D, Claritin-D, etc) should be taken daily. Saline nasal sprays or Net-Pots can be purchased over the counter to help reduce nasal congestion. Get rest and take a multivitamin daily to boost the immune system. Return to ED for any worsening or new symptoms such as throat swelling, high fevers, shortness of breath, vomiting, or if symptoms do not improve. Cough: Care Instructions  Your Care Instructions    A cough is your body's response to something that bothers your throat or airways. Many things can cause a cough. You might cough because of a cold or the flu, bronchitis, or asthma. Smoking, postnasal drip, allergies, and stomach acid that backs up into your throat also can cause coughs. A cough is a symptom, not a disease. Most coughs stop when the cause, such as a cold, goes away. You can take a few steps at home to cough less and feel better. Follow-up care is a key part of your treatment and safety. Be sure to make and go to all appointments, and call your doctor if you are having problems. It's also a good idea to know your test results and keep a list of the medicines you take. How can you care for yourself at home? · Drink lots of water and other fluids. This helps thin the mucus and soothes a dry or sore throat. Honey or lemon juice in hot water or tea may ease a dry cough. · Take cough medicine as directed by your doctor. · Prop up your head on pillows to help you breathe and ease a dry cough. · Try cough drops to soothe a dry or sore throat. Cough drops don't stop a cough. Medicine-flavored cough drops are no better than candy-flavored drops or hard candy. · Do not smoke. Avoid secondhand smoke. If you need help quitting, talk to your doctor about stop-smoking programs and medicines.  These can increase your chances of quitting for good. When should you call for help? Call 911 anytime you think you may need emergency care. For example, call if:  ? · You have severe trouble breathing. ?Call your doctor now or seek immediate medical care if:  ? · You cough up blood. ? · You have new or worse trouble breathing. ? · You have a new or higher fever. ? · You have a new rash. ? Watch closely for changes in your health, and be sure to contact your doctor if:  ? · You cough more deeply or more often, especially if you notice more mucus or a change in the color of your mucus. ? · You have new symptoms, such as a sore throat, an earache, or sinus pain. ? · You do not get better as expected. Where can you learn more? Go to http://luzmaria-bradly.info/. Enter D279 in the search box to learn more about \"Cough: Care Instructions. \"  Current as of: May 12, 2017  Content Version: 11.4  © 1822-7339 Healthwise, Incorporated. Care instructions adapted under license by [employer] (which disclaims liability or warranty for this information). If you have questions about a medical condition or this instruction, always ask your healthcare professional. [address] any warranty or liability for your use of this information.

## 2018-02-22 NOTE — ED PROVIDER NOTES
EMERGENCY DEPARTMENT HISTORY AND PHYSICAL EXAM    11:44 AM      Date: 2/22/2018  Patient Name: Damian Breaux Sr    History of Presenting Illness     Chief Complaint   Patient presents with    Flu         History Provided By: Patient    Chief Complaint: cough   Duration: 1 Weeks  Timing:  Acute  Location: n/a   Quality: n/a   Severity: N/A  Modifying Factors: flonase and OTC cough meds   Associated Symptoms: fever, nasal congestion, headaches, sore throat, diarrhea, nausea, body aches , wheezing       Additional History (Context): Justin Smith is a 58 y.o. male with hypertension and Afib and chrons who presents with cough and flu like symptoms. Symptoms x 1 week. Productive cough with yellow mucous. Wheezing. Hurts to cough, but no chest pain otherwise. No vomiting. Symptoms listed above. PCP: Maren Gonzales MD    Current Outpatient Prescriptions   Medication Sig Dispense Refill    fluticasone (FLONASE) 50 mcg/actuation nasal spray INSERT TWO SPRAYS IN EACH NOSTRIL EVERY DAY 16 g 4    CARTIA  mg ER capsule       flecainide (TAMBOCOR) 50 mg tablet 50 mg.      alfuzosin SR (UROXATRAL) 10 mg SR tablet TAKE ONE TABLET BY MOUTH ONCE DAILY 30 Tab 5    aspirin 81 mg chewable tablet 81 mg.      mesalamine EC (ASACOL) 400 mg EC tablet Take 400 mg by mouth three (3) times daily. Past History     Past Medical History:  Past Medical History:   Diagnosis Date    A-fib [facility])     BPH (benign prostatic hypertrophy) with urinary obstruction 7/16/2010    Crohn's disease ([facility] [address]     Crohn's disease of the colon 6/2/2011    Dyspnea     ED (erectile dysfunction) 5/20/2010    Hypertension        Past Surgical History:  History reviewed. No pertinent surgical history. Family History:  History reviewed. No pertinent family history.     Social History:  Social History   Substance Use Topics    Smoking status: Former Smoker     Quit date: 5/20/1990    Smokeless tobacco: Never Used   [employer] [place] Alcohol use No       Allergies: Allergies   Allergen Reactions    Iron Nausea Only         Review of Systems       Review of Systems   Constitutional: Positive for fever. HENT: Positive for congestion, sinus pressure and sore throat. Negative for facial swelling and sinus pain. Eyes: Negative for visual disturbance. Respiratory: Positive for cough and wheezing. Negative for shortness of breath. Cardiovascular: Negative for chest pain. Gastrointestinal: Positive for diarrhea and nausea. Negative for abdominal pain. Genitourinary: Negative for dysuria. Musculoskeletal: Negative for neck pain. Skin: Negative for rash. Neurological: Positive for headaches. Negative for dizziness and numbness. Psychiatric/Behavioral: Negative for confusion. All other systems reviewed and are negative. Physical Exam     Visit Vitals    /78 (BP 1 Location: Left arm, BP Patient Position: At rest)    Pulse 73    Temp 98.7 °F (37.1 °C)    Resp 18    Ht 6' (1.829 m)    Wt 98.9 kg (218 lb)    SpO2 100%    BMI 29.57 kg/m2         Physical Exam   Constitutional: He appears well-developed and well-nourished. No distress. HENT:   Head: Normocephalic and atraumatic. Right Ear: Tympanic membrane, external ear and ear canal normal.   Left Ear: Tympanic membrane, external ear and ear canal normal.   Nose: Right sinus exhibits maxillary sinus tenderness and frontal sinus tenderness. Left sinus exhibits maxillary sinus tenderness and frontal sinus tenderness. Mouth/Throat: Uvula is midline, oropharynx is clear and moist and mucous membranes are normal. No trismus in the jaw. No uvula swelling. No oropharyngeal exudate, posterior oropharyngeal edema, posterior oropharyngeal erythema or tonsillar abscesses. Eyes: Conjunctivae are normal.   Neck: Normal range of motion. Neck supple. Cardiovascular: Normal rate, regular rhythm and normal heart sounds. Pulmonary/Chest: Effort normal. He has wheezes. He has rales. Abdominal: Soft. There is no tenderness. Musculoskeletal: Normal range of motion. Neurological: He is alert. Skin: Skin is warm and dry. He is not diaphoretic. Psychiatric: He has a normal mood and affect. Nursing note and vitals reviewed. Diagnostic Study Results     Labs -  No results found for this or any previous visit (from the past 12 hour(s)). Radiologic Studies -   XR CHEST PA LAT    (Results Pending)         Medical Decision Making   I am the first provider for this patient. I reviewed the vital signs, available nursing notes, past medical history, past surgical history, family history and social history. Vital Signs-Reviewed the patient's vital signs. Records Reviewed: Nursing Notes (Time of Review: 11:44 AM)    ED Course: Progress Notes, Reevaluation, and Consults:    9375: CXR appears negative, but wheezing and rales in lungs suggest URI, will cover with abx. Discussed treatment plan, return precautions, symptomatic relief, and expected time to improvement. All questions answered. Patient is stable for discharge and outpatient management. Provider Notes (Medical Decision Making): MDM  Number of Diagnoses or Management Options  Acute upper respiratory infection:   Cough:   Nasal congestion:   Diagnosis management comments: Flu like symptoms x 1 week, wheezing and cough. Diagnosis     Clinical Impression: No diagnosis found. Disposition:     Follow-up Information     None           Patient's Medications   Start Taking    No medications on file   Continue Taking    ALFUZOSIN SR (UROXATRAL) 10 MG SR TABLET    TAKE ONE TABLET BY MOUTH ONCE DAILY    ASPIRIN 81 MG CHEWABLE TABLET    81 mg. CARTIA  MG ER CAPSULE        FLECAINIDE (TAMBOCOR) 50 MG TABLET    50 mg.     FLUTICASONE (FLONASE) 50 MCG/ACTUATION NASAL SPRAY    INSERT TWO SPRAYS IN EACH NOSTRIL EVERY DAY    MESALAMINE EC (ASACOL) 400 MG EC TABLET    Take 400 mg by mouth three (3) times daily. These Medications have changed    No medications on file   Stop Taking    No medications on file     _______________________________    Attestations:  Angel Domingo PA-C acting as a scribe for and in the presence of Alfonso Roque PA-C      February 22, 2018 at 11:44 AM       Provider Attestation:      I personally performed the services described in the documentation, reviewed the documentation, as recorded by the scribe in my presence, and it accurately and completely records my words and actions.  February 22, 2018 at 11:44 AM - Alfonso Roque PA-C  _______________________________

## 2018-04-09 ENCOUNTER — OFFICE VISIT (OUTPATIENT)
Dept: UROLOGY | Age: 63
End: 2018-04-09

## 2018-04-09 ENCOUNTER — HOSPITAL ENCOUNTER (OUTPATIENT)
Dept: LAB | Age: 63
Discharge: HOME | End: 2018-04-09
Payer: COMMERCIAL

## 2018-04-09 VITALS
SYSTOLIC BLOOD PRESSURE: 150 MMHG | HEART RATE: 64 BPM | HEIGHT: 72 IN | BODY MASS INDEX: 29.39 KG/M2 | WEIGHT: 217 LBS | OXYGEN SATURATION: 99 % | DIASTOLIC BLOOD PRESSURE: 85 MMHG

## 2018-04-09 DIAGNOSIS — N40.1 BPH WITH OBSTRUCTION/LOWER URINARY TRACT SYMPTOMS: Primary | ICD-10-CM

## 2018-04-09 DIAGNOSIS — N13.8 BPH WITH OBSTRUCTION/LOWER URINARY TRACT SYMPTOMS: ICD-10-CM

## 2018-04-09 DIAGNOSIS — N13.8 BPH WITH OBSTRUCTION/LOWER URINARY TRACT SYMPTOMS: Primary | ICD-10-CM

## 2018-04-09 DIAGNOSIS — N40.1 BPH WITH OBSTRUCTION/LOWER URINARY TRACT SYMPTOMS: ICD-10-CM

## 2018-04-09 LAB
BILIRUB UR QL STRIP: NEGATIVE
GLUCOSE UR-MCNC: NEGATIVE MG/DL
KETONES P FAST UR STRIP-MCNC: NEGATIVE MG/DL
PH UR STRIP: 6 [PH] (ref 4.6–8)
PROT UR QL STRIP: NEGATIVE
PSA SERPL-MCNC: 3.6 NG/ML (ref 0–4)
SP GR UR STRIP: 1.01 (ref 1–1.03)
UA UROBILINOGEN AMB POC: NORMAL (ref 0.2–1)
URINALYSIS CLARITY POC: CLEAR
URINALYSIS COLOR POC: YELLOW
URINE BLOOD POC: NEGATIVE
URINE LEUKOCYTES POC: NEGATIVE
URINE NITRITES POC: NEGATIVE

## 2018-04-09 PROCEDURE — 84153 ASSAY OF PSA TOTAL: CPT | Performed by: UROLOGY

## 2018-04-09 RX ORDER — ALFUZOSIN HYDROCHLORIDE 10 MG/1
10 TABLET, EXTENDED RELEASE ORAL DAILY
Qty: 90 TAB | Refills: 3 | Status: SHIPPED | OUTPATIENT
Start: 2018-04-09 | End: 2019-02-05

## 2018-04-09 NOTE — PROGRESS NOTES
MrRishabh Tate has a reminder for a \"due or due soon\" health maintenance. I have asked that he contact his primary care provider for follow-up on this health maintenance. RBV Per Dr. Gloria Interiano draw lab today for PSA for BPH.

## 2018-04-09 NOTE — MR AVS SNAPSHOT
[address] [facility] [address] [address] 
[phone] Patient: Kim Moura Sr 
MRN:  [id number] Visit Information Date & Time Provider Department Dept. Phone Encounter #  
 4/9/2018  9:15 AM Vasu Leonard, [address] [place] [address] [facility] [phone] [id number] Your Appointments 8/1/2018  8:10 AM  
LAB with [facility] (CHARLIE Hicks) Appt Note: 6 mo f/u lab [address] [facility] [address] [address]  
[phone]  
  
   
 [address], [facility]  
  
    
 8/8/2018  9:15 AM  
Office Visit with Ifeoma Brewer MD  
[address] [place] [address]) Appt Note: 6 mo f/u  
 [address], [address] [address]  
[phone]  
  
   
 [address],  [address] [place] [address] Upcoming Health Maintenance Date Due DTaP/Tdap/Td series (1 - Tdap) 10/4/1976 ZOSTER VACCINE AGE 60> 8/4/2015 Influenza Age 5 to Adult 8/1/2017 COLONOSCOPY 11/9/2025 Allergies as of 4/9/2018  Review Complete On: 4/9/2018 By: Vasu Leonard MD  
  
 Severity Noted Reaction Type Reactions Iron  05/23/2016    Nausea Only Current Immunizations  Reviewed on 12/7/2015 Name Date  
 TB Skin Test (PPD) Intradermal 12/7/2015 Not reviewed this visit You Were Diagnosed With   
  
 Codes Comments BPH with obstruction/lower urinary tract symptoms    -  Primary ICD-10-CM: N40.1, N13.8 ICD-9-CM: 600.01, 599.69 Vitals BP Pulse Height(growth percentile) Weight(growth percentile) SpO2 BMI  
 150/85 (BP 1 Location: Left arm, BP Patient Position: Sitting) 64 6' (1.829 m) 217 lb (98.4 kg) 99% 29.43 kg/m2 Smoking Status Former Smoker Vitals History BMI and BSA Data Body Mass Index Body Surface Area  
 29.43 kg/m 2 2.24 m 2 Preferred Pharmacy Pharmacy Name Phone [address] [place], [address] [place] [address] [phone] Your Updated Medication List  
  
   
This list is accurate as of 4/9/18  9:30 AM.  Always use your most recent med list.  
  
  
  
  
 * alfuzosin SR 10 mg SR tablet Commonly known as:  UROXATRAL  
TAKE ONE TABLET BY MOUTH ONCE DAILY  
  
 * alfuzosin SR 10 mg SR tablet Commonly known as:  Billye Blades Take 1 Tab by mouth daily. Indications: benign prostatic hyperplasia with lower urinary tract sx ASACOL 400 mg EC tablet Generic drug:  mesalamine EC Take 400 mg by mouth three (3) times daily. aspirin 81 mg chewable tablet 81 mg. CARTIA  mg ER capsule Generic drug:  dilTIAZem CD  
  
 chlorpheniramine-HYDROcodone 10-8 mg/5 mL suspension Commonly known as:  Izella Koki ER Take 5 mL by mouth every twelve (12) hours as needed for Cough. Max Daily Amount: 10 mL. flecainide 50 mg tablet Commonly known as:  TAMBOCOR 50 mg.  
  
 fluticasone 50 mcg/actuation nasal spray Commonly known as:  [place] INSERT TWO SPRAYS IN EACH NOSTRIL EVERY DAY  
  
 loratadine-pseudoephedrine  mg per tablet Commonly known as:  CLARITIN-D 24 HOUR Take 1 Tab by mouth daily. * Notice: This list has 2 medication(s) that are the same as other medications prescribed for you. Read the directions carefully, and ask your doctor or other care provider to review them with you. Prescriptions Sent to Pharmacy Refills  
 alfuzosin SR (UROXATRAL) 10 mg SR tablet 3 Sig: Take 1 Tab by mouth daily. Indications: benign prostatic hyperplasia with lower urinary tract sx Class: Normal  
 Pharmacy: [facility] [address] LILLIANA OCHOA [address] [facility], [address] [place] [address] Ph #: [phone] Route: Oral  
  
We Performed the Following AMB POC URINALYSIS DIP STICK AUTO W/O MICRO [04664 CPT(R)] COLLECTION VENOUS BLOOD,VENIPUNCTURE [id number] CPT(R)] TANMAY DAWKINS,POST-VOID RES,US,NON-IMAGING [id number] CPT(R)] Patient Instructions Prostate Cancer Screening: Care Instructions Your Care Instructions The prostate gland is an organ found just below a man's bladder. It is the size and shape of a walnut. It surrounds the tube that carries urine from the bladder out of the body through the penis. This tube is called the urethra. Prostate cancer is the abnormal growth of cells in the prostate. It is the second most common type of cancer in men. (Skin cancer is the most common.) Most cases of prostate cancer occur in men older than 72. The disease runs in families. And it's more common in -American men. When it's found and treated early, prostate cancer may be cured. But it is not always treated. This is because prostate cancer may not shorten your life, especially if you are older and the cancer is growing slowly. Follow-up care is a key part of your treatment and safety. Be sure to make and go to all appointments, and call your doctor if you are having problems. It's also a good idea to know your test results and keep a list of the medicines you take. What are the screening tests for prostate cancer? The main screening test for prostate cancer is the prostate-specific antigen (PSA) test. This is a blood test that measures how much PSA is in your blood. A high level may mean that you have an enlargement, an infection, or cancer. Along with the PSA test, you may have a digital rectal exam. The digital (finger) rectal exam checks for anything abnormal in your prostate. To do the exam, the doctor puts a lubricated, gloved finger into your rectum. If these tests suggest cancer, you may need a prostate biopsy. How is prostate cancer diagnosed? In a biopsy, the doctor takes small tissue samples from your prostate gland.  Another doctor then looks at the tissue under a microscope to see if there are cancer cells, signs of infection, or other problems. The results help diagnose prostate cancer. What are the pros and cons of screening? Neither a PSA test nor a digital rectal exam can tell you for sure that you do or do not have cancer. But they can help you decide if you need more tests, such as a prostate biopsy. Screening tests may be useful because most men with prostate cancer don't have symptoms. It can be hard to know if you have cancer until it is more advanced. And then it's harder to treat. But having a PSA test can also cause harm. The test may show high levels of PSA that aren't caused by cancer. So you could have a prostate biopsy you didn't need. Or the PSA test might be normal when there is cancer, so a cancer might not be found early. The test can also find cancers that would never have caused a problem during your lifetime. So you might have treatment that was not needed. Prostate cancer usually develops late in life and grows slowly. For many men, it does not shorten their lives. Some experts advise screening only for men who are at high risk. Talk with your doctor to see if screening is right for you. Where can you learn more? Go to http://luzmaria-bradly.info/. Enter R550 in the search box to learn more about \"Prostate Cancer Screening: Care Instructions. \" Current as of: May 12, 2017 Content Version: 11.4 © 6443-0621 [employer]. Care instructions adapted under license by [employer] (which disclaims liability or warranty for this information). If you have questions about a medical condition or this instruction, always ask your healthcare professional. [place] [address] any warranty or liability for your use of this information. Introducing [facility]!    
 [facility] introduces Nebel.TV patient portal. Now you can access parts of your medical record, email your doctor's office, and request medication refills online. 1. In your internet browser, go to [email or web address] 2. Click on the First Time User? Click Here link in the Sign In box. You will see the New Member Sign Up page. 3. Enter your [employer] Access Code exactly as it appears below. You will not need to use this code after youve completed the sign-up process. If you do not sign up before the expiration date, you must request a new code. · [employer] Access Code: [email or web address] Expires: 5/8/2018 10:42 AM 
 
4. Enter the last four digits of your Social Security Number (xxxx) and Date of Birth (mm/dd/yyyy) as indicated and click Submit. You will be taken to the next sign-up page. 5. Create a [employer] ID. This will be your [employer] login ID and cannot be changed, so think of one that is secure and easy to remember. 6. Create a [employer] password. You can change your password at any time. 7. Enter your Password Reset Question and Answer. This can be used at a later time if you forget your password. 8. Enter your e-mail address. You will receive e-mail notification when new information is available in [address] 9. Click Sign Up. You can now view and download portions of your medical record. 10. Click the Download Summary menu link to download a portable copy of your medical information. If you have questions, please visit the Frequently Asked Questions section of the [employer] website. Remember, [employer] is NOT to be used for urgent needs. For medical emergencies, dial 911. Now available from your iPhone and Android! Please provide this summary of care documentation to your next provider. Your primary care clinician is listed as YASMANI MORALES. If you have any questions after today's visit, please call [phone].

## 2018-04-09 NOTE — PATIENT INSTRUCTIONS
Prostate Cancer Screening: Care Instructions  Your Care Instructions    The prostate gland is an organ found just below a man's bladder. It is the size and shape of a walnut. It surrounds the tube that carries urine from the bladder out of the body through the penis. This tube is called the urethra. Prostate cancer is the abnormal growth of cells in the prostate. It is the second most common type of cancer in men. (Skin cancer is the most common.)  Most cases of prostate cancer occur in men older than 72. The disease runs in families. And it's more common in -American men. When it's found and treated early, prostate cancer may be cured. But it is not always treated. This is because prostate cancer may not shorten your life, especially if you are older and the cancer is growing slowly. Follow-up care is a key part of your treatment and safety. Be sure to make and go to all appointments, and call your doctor if you are having problems. It's also a good idea to know your test results and keep a list of the medicines you take. What are the screening tests for prostate cancer? The main screening test for prostate cancer is the prostate-specific antigen (PSA) test. This is a blood test that measures how much PSA is in your blood. A high level may mean that you have an enlargement, an infection, or cancer. Along with the PSA test, you may have a digital rectal exam. The digital (finger) rectal exam checks for anything abnormal in your prostate. To do the exam, the doctor puts a lubricated, gloved finger into your rectum. If these tests suggest cancer, you may need a prostate biopsy. How is prostate cancer diagnosed? In a biopsy, the doctor takes small tissue samples from your prostate gland. Another doctor then looks at the tissue under a microscope to see if there are cancer cells, signs of infection, or other problems. The results help diagnose prostate cancer.   What are the pros and cons of screening? Neither a PSA test nor a digital rectal exam can tell you for sure that you do or do not have cancer. But they can help you decide if you need more tests, such as a prostate biopsy. Screening tests may be useful because most men with prostate cancer don't have symptoms. It can be hard to know if you have cancer until it is more advanced. And then it's harder to treat. But having a PSA test can also cause harm. The test may show high levels of PSA that aren't caused by cancer. So you could have a prostate biopsy you didn't need. Or the PSA test might be normal when there is cancer, so a cancer might not be found early. The test can also find cancers that would never have caused a problem during your lifetime. So you might have treatment that was not needed. Prostate cancer usually develops late in life and grows slowly. For many men, it does not shorten their lives. Some experts advise screening only for men who are at high risk. Talk with your doctor to see if screening is right for you. Where can you learn more? Go to http://luzmaria-bradly.info/. Enter R550 in the search box to learn more about \"Prostate Cancer Screening: Care Instructions. \"  Current as of: May 12, 2017  Content Version: 11.4  © 5878-8126 Healthwise, [employer]. Care instructions adapted under license by [employer] (which disclaims liability or warranty for this information). If you have questions about a medical condition or this instruction, always ask your healthcare professional. [address] any warranty or liability for your use of this information.

## 2018-04-09 NOTE — PROGRESS NOTES
Sandra Metz Sr 58 y.o. male     Mr. Janessa Cooper seen today for doing very well now voiding with a forceful stream good control nocturia 0-1 episodes per night  Patient has a history going back 30 years of recurrent left epididymitis hospitalized for 2 months at age 25 further epididymitis-10% PA disability secondary to chronic epididymitis or  Patient has experienced no symptoms improvement on alpha-blocker therapy with Uroxatral 10 mg daily  Retired in October 2017-thinking about going back to work no complaints regarding urination at this time-      PSA 2.5  in February 2016  PSA 2.5 in February 2017          Review of Systems:    CNS: Procedure syncope headaches or dizziness no visual changes  Respiratory: No wheezing or shortness of breath  Cardiovascular:No chest pain or palpitations/hypertension  Intestinal:Chronic constipation no dyspepsia no diarrhea  Urinary: Obstructive and irritative voiding symptoms-Chronic left epididymitis  Skeletal: Chronic low back pain  Endocrine:No diabetes or thyroid disease  Other:    Annual follow-up symptomatic BPH on alpha-blocker therapy with Uroxatrol 10 mg daily  Allergies: Allergies   Allergen Reactions    Iron Nausea Only      Medications:    Current Outpatient Prescriptions   Medication Sig Dispense Refill    CARTIA  mg ER capsule       flecainide (TAMBOCOR) 50 mg tablet 50 mg.      alfuzosin SR (UROXATRAL) 10 mg SR tablet TAKE ONE TABLET BY MOUTH ONCE DAILY 30 Tab 5    aspirin 81 mg chewable tablet 81 mg.      mesalamine EC (ASACOL) 400 mg EC tablet Take 400 mg by mouth three (3) times daily.  chlorpheniramine-HYDROcodone (TUSSIONEX PENNKINETIC ER) 10-8 mg/5 mL suspension Take 5 mL by mouth every twelve (12) hours as needed for Cough. Max Daily Amount: 10 mL. 60 mL 0    loratadine-pseudoephedrine (CLARITIN-D 24 HOUR)  mg per tablet Take 1 Tab by mouth daily.  10 Tab 0    fluticasone (FLONASE) 50 mcg/actuation nasal spray INSERT TWO SPRAYS IN EACH NOSTRIL EVERY DAY 16 g 4       Past Medical History:   Diagnosis Date    A-fib [facility])     BPH (benign prostatic hypertrophy) with urinary obstruction 7/16/2010    Crohn's disease ([facility] [address]     Crohn's disease of the colon 6/2/2011    Dyspnea     ED (erectile dysfunction) 5/20/2010    Hypertension       History reviewed. No pertinent surgical history. History reviewed. No pertinent family history. Physical Examination: Well-nourished mature male in no apparent distress    Prostate by WILLIAM is large rounded smooth benign consistency nontender-no induration no nodularity   no rectal masses induration or tenderness    Urinalysis: Negative dipstick/nitrite negative/heme-negative    PVR today 32 cc    Impression: Is responding favorably to alpha-blocker therapy        Plan: PSA today            Rx  Uroxatrol 10 mg daily #90 refill ×3    rtc 1 y PSA WILLIAM PVRr       More than 1/2 of this 15 minute visit was spent in counselling and coordination of care, as described above. Francia Larose MD  -electronically signed-    PLEASE NOTE:  This document has been produced using voice recognition software. Unrecognized errors in transcription may be present.

## 2018-04-12 NOTE — PROGRESS NOTES
3.6 on 9 April 2018    PSA 2.5  in February 2016  PSA 2.5 in February 2017            Impression increased rate of PSA rise    Plan: Repeat PSA in 2 months proceed with prostate biopsy if PSA rise persists or increases      Juanita Wong MD

## 2018-04-18 ENCOUNTER — DOCUMENTATION ONLY (OUTPATIENT)
Dept: UROLOGY | Age: 63
End: 2018-04-18

## 2018-04-18 NOTE — PROGRESS NOTES
Patient needs to have PSA repeated in two months couldn't leave a message mail box full. Spoke with the patient and he was told to come in the office in June to have PSA drawn at that time it will be determined if the patient will need a biopsy.

## 2018-05-02 ENCOUNTER — DOCUMENTATION ONLY (OUTPATIENT)
Dept: UROLOGY | Age: 63
End: 2018-05-02

## 2018-05-02 NOTE — PROGRESS NOTES
Spoke with patient and he was told to come in two(June) months to have PSA repeated because it is elevated

## 2018-07-03 ENCOUNTER — OFFICE VISIT (OUTPATIENT)
Dept: FAMILY MEDICINE CLINIC | Age: 63
End: 2018-07-03

## 2018-07-03 VITALS
OXYGEN SATURATION: 95 % | HEART RATE: 78 BPM | BODY MASS INDEX: 28.58 KG/M2 | DIASTOLIC BLOOD PRESSURE: 77 MMHG | RESPIRATION RATE: 20 BRPM | TEMPERATURE: 99.1 F | HEIGHT: 72 IN | WEIGHT: 211 LBS | SYSTOLIC BLOOD PRESSURE: 144 MMHG

## 2018-07-03 DIAGNOSIS — J30.1 SEASONAL ALLERGIC RHINITIS DUE TO POLLEN: Primary | ICD-10-CM

## 2018-07-03 NOTE — PROGRESS NOTES
Patient is in the office today for medication follow up. 1. Have you been to the ER, urgent care clinic since your last visit? Hospitalized since your last visit? No    2. Have you seen or consulted any other health care providers outside of the [address] [place] [address] since your last visit? Include any pap smears or colon screening.  No

## 2018-07-03 NOTE — MR AVS SNAPSHOT
[address] [facility] 
 
 
 [address] [place], [place] [address] [address] 
[phone] Patient: Kinjal Mir Sr 
MRN:  [id number] Visit Information Date & Time Provider Department Dept. Phone Encounter #  
 7/3/2018  5:00 PM Ashley Caal, [address] [place] [address] [phone] [id number] Follow-up Instructions Return if symptoms worsen or fail to improve. Your Appointments 8/1/2018  8:10 AM  
LAB with [facility] (CHARLIE Hicks) Appt Note: 6 mo f/u lab [address] [address]  
[phone]  
  
   
 [address] [facility]  
  
    
 8/8/2018  9:15 AM  
Office Visit with Ashley Caal MD  
[address] [place] [address] [facility] Appt Note: 6 mo f/u  
 [address], [facility] [address] [address]  
[phone]  
  
   
 [address] [facility]  
  
    
 4/9/2019  9:00 AM  
ULTRASOUND with Josh Tidwell MD  
[facility] Appt Note: PVR/PSA  
 [address] Harpreet A [address] [address]  
[phone] [address] [facility] [address] Upcoming Health Maintenance Date Due DTaP/Tdap/Td series (1 - Tdap) 10/4/1976 ZOSTER VACCINE AGE 60> 8/4/2015 Influenza Age 5 to Adult 8/1/2018 COLONOSCOPY 11/9/2025 Allergies as of 7/3/2018  Review Complete On: 7/3/2018 By: Ashley Caal MD  
  
 Severity Noted Reaction Type Reactions Iron  05/23/2016    Nausea Only Current Immunizations  Reviewed on 12/7/2015 Name Date  
 TB Skin Test (PPD) Intradermal 12/7/2015 Not reviewed this visit You Were Diagnosed With   
  
 Codes Comments Seasonal allergic rhinitis due to pollen    -  Primary ICD-10-CM: J30.1 ICD-9-CM: 477.0 Vitals BP Pulse Temp Resp Height(growth percentile) Weight(growth percentile) 144/77 (BP 1 Location: Left arm, BP Patient Position: Sitting) 78 99.1 °F (37.3 °C) (Oral) 20 6' (1.829 m) 211 lb (95.7 kg) SpO2 BMI Smoking Status 95% 28.62 kg/m2 Former Smoker BMI and BSA Data Body Mass Index Body Surface Area  
 28.62 kg/m 2 2.2 m 2 Preferred Pharmacy Pharmacy Name Phone [address] [place] [address] [phone] Your Updated Medication List  
  
   
This list is accurate as of 7/3/18  5:11 PM.  Always use your most recent med list.  
  
  
  
  
 * alfuzosin SR 10 mg SR tablet Commonly known as:  UROXATRAL  
TAKE ONE TABLET BY MOUTH ONCE DAILY  
  
 * alfuzosin SR 10 mg SR tablet Commonly known as:  Yuni Landry Take 1 Tab by mouth daily. Indications: benign prostatic hyperplasia with lower urinary tract sx ASACOL 400 mg EC tablet Generic drug:  mesalamine EC Take 400 mg by mouth three (3) times daily. aspirin 81 mg chewable tablet 81 mg. CARTIA  mg ER capsule Generic drug:  dilTIAZem CD  
  
 chlorpheniramine-HYDROcodone 10-8 mg/5 mL suspension Commonly known as:  Amalia Heike ER Take 5 mL by mouth every twelve (12) hours as needed for Cough. Max Daily Amount: 10 mL. flecainide 50 mg tablet Commonly known as:  TAMBOCOR 50 mg.  
  
 fluticasone 50 mcg/actuation nasal spray Commonly known as:  Lella Solum INSERT TWO SPRAYS IN EACH NOSTRIL EVERY DAY  
  
 loratadine-pseudoephedrine  mg per tablet Commonly known as:  CLARITIN-D 24 HOUR Take 1 Tab by mouth daily. * Notice: This list has 2 medication(s) that are the same as other medications prescribed for you. Read the directions carefully, and ask your doctor or other care provider to review them with you. Follow-up Instructions Return if symptoms worsen or fail to improve. Patient Instructions Using Your Medicines: Care Instructions Your Care Instructions Medicines are an important part of treatment for many health problems. But for them to help you the most, you have to take them the right way. To do this, you need to know your medicines. And you need to take them safely. Follow-up care is a key part of your treatment and safety. Be sure to make and go to all appointments, and call your doctor if you are having problems. It's also a good idea to know your test results and keep a list of the medicines you take. How can you care for yourself at home? Know your medicines · Talk to your doctors. Make sure you know why you are taking each medicine. · Make a master list of all your medicines. Write down the medicine names and doctors' names. Include doses and side effects too. And write down why you take each medicine. Include all prescription and over-the-counter medicines, vitamins, and supplements. Keep this list up to date. Take a copy to each doctor visit. · Know when you will run out of each medicine. Ask your pharmacist if there are ways the drugstore can remind you to refill your medicines so you do not run out. Write refill reminders on your calendar. Don't wait until you have a few pills left. · Ask your pharmacist to plan your refills so that you can  all your medicines at the same time. This can mean fewer trips to the drugstore. Be safe · Talk with your pharmacist or doctor before you take a new prescription, over-the-counter medicine, or supplement. Ask about side effects and interactions (how your medicines might react with each other). And find out what you can do about them. · If you are having a side effect or think a side effect may be caused by an interaction, talk to your doctor or pharmacist. He or she will help you figure out how to adjust your medicines to avoid the problem. · Ask your doctor or pharmacist to run your medicine list through a drug interaction .  This checks for medicines that can cause problems when taken together. If you find a problem, talk to your doctor. · Use one drugstore or pharmacy, if you can. The pharmacist will know which medicines you take. He or she will watch for problems. · If side effects bother you and you wonder if you should keep taking a medicine, call your doctor. He or she may be able to lower your dose or change your medicine. · If you have had a bad allergic reaction to a medicine before and are exposed to it again, watch for symptoms. Treat any symptoms as an emergency. Even if the symptoms are mild at first, they may quickly become very severe. Take your medicines the right way · Be safe with medicines. Take your medicines exactly as prescribed. Call your doctor if you think you are having a problem with your medicine. · Make a daily schedule of your medicines. Put your schedule someplace where you will always see it. Make sure it is easy to find. · Use a pillbox. You can buy small pillboxes with just a few compartments. Or you can buy larger ones. If you use a pillbox, keep one pill in its original bottle. Then if you forget what a pill is for, you can find the bottle it came from. · Remind yourself. Get sticky notes, and make reminders to take your medicine. Post them near clocks or on the bathroom mirror. Use a wristwatch with an alarm. You can set it when you need to take your medicine. · Take the medicine when you do a daily task. For instance, you can take it when you brush your teeth or make your coffee. · Talk with your doctor about what you should do if you miss a dose. Find out what to do for each medicine. It may be different for each one. Where can you learn more? Go to http://luzmaria-bradly.info/. Enter E809 in the search box to learn more about \"Using Your Medicines: Care Instructions. \" Current as of: August 14, 2016 Content Version: 11.4 © 1263-4233 Healthwise, Incorporated.  Care instructions adapted under license by [address] (which disclaims liability or warranty for this information). If you have questions about a medical condition or this instruction, always ask your healthcare professional. [address] any warranty or liability for your use of this information. Introducing [facility]! [facility] introduces Glide patient portal. Now you can access parts of your medical record, email your doctor's office, and request medication refills online. 1. In your internet browser, go to [email or web address] 2. Click on the First Time User? Click Here link in the Sign In box. You will see the New Member Sign Up page. 3. Enter your Glide Access Code exactly as it appears below. You will not need to use this code after youve completed the sign-up process. If you do not sign up before the expiration date, you must request a new code. · Glide Access Code: [email or web address] Expires: 10/1/2018  5:11 PM 
 
4. Enter the last four digits of your Social Security Number (xxxx) and Date of Birth (mm/dd/yyyy) as indicated and click Submit. You will be taken to the next sign-up page. 5. Create a Glide ID. This will be your Glide login ID and cannot be changed, so think of one that is secure and easy to remember. 6. Create a Glide password. You can change your password at any time. 7. Enter your Password Reset Question and Answer. This can be used at a later time if you forget your password. 8. Enter your e-mail address. You will receive e-mail notification when new information is available in [address] 9. Click Sign Up. You can now view and download portions of your medical record. 10. Click the Download Summary menu link to download a portable copy of your medical information. If you have questions, please visit the Frequently Asked Questions section of the Glide website.  Remember, Glide is NOT to be used for urgent needs. For medical emergencies, dial 911. Now available from your iPhone and Android! Please provide this summary of care documentation to your next provider. Your primary care clinician is listed as YASMANI MORALES. If you have any questions after today's visit, please call [phone].

## 2018-07-03 NOTE — PATIENT INSTRUCTIONS
Using Your Medicines: Care Instructions  Your Care Instructions    Medicines are an important part of treatment for many health problems. But for them to help you the most, you have to take them the right way. To do this, you need to know your medicines. And you need to take them safely. Follow-up care is a key part of your treatment and safety. Be sure to make and go to all appointments, and call your doctor if you are having problems. It's also a good idea to know your test results and keep a list of the medicines you take. How can you care for yourself at home? Know your medicines  · Talk to your doctors. Make sure you know why you are taking each medicine. · Make a master list of all your medicines. Write down the medicine names and doctors' names. Include doses and side effects too. And write down why you take each medicine. Include all prescription and over-the-counter medicines, vitamins, and supplements. Keep this list up to date. Take a copy to each doctor visit. · Know when you will run out of each medicine. Ask your pharmacist if there are ways the drugstore can remind you to refill your medicines so you do not run out. Write refill reminders on your calendar. Don't wait until you have a few pills left. · Ask your pharmacist to plan your refills so that you can  all your medicines at the same time. This can mean fewer trips to the drugstore. Be safe  · Talk with your pharmacist or doctor before you take a new prescription, over-the-counter medicine, or supplement. Ask about side effects and interactions (how your medicines might react with each other). And find out what you can do about them. · If you are having a side effect or think a side effect may be caused by an interaction, talk to your doctor or pharmacist. He or she will help you figure out how to adjust your medicines to avoid the problem.   · Ask your doctor or pharmacist to run your medicine list through a drug interaction . This checks for medicines that can cause problems when taken together. If you find a problem, talk to your doctor. · Use one drugstore or pharmacy, if you can. The pharmacist will know which medicines you take. He or she will watch for problems. · If side effects bother you and you wonder if you should keep taking a medicine, call your doctor. He or she may be able to lower your dose or change your medicine. · If you have had a bad allergic reaction to a medicine before and are exposed to it again, watch for symptoms. Treat any symptoms as an emergency. Even if the symptoms are mild at first, they may quickly become very severe. Take your medicines the right way  · Be safe with medicines. Take your medicines exactly as prescribed. Call your doctor if you think you are having a problem with your medicine. · Make a daily schedule of your medicines. Put your schedule someplace where you will always see it. Make sure it is easy to find. · Use a pillbox. You can buy small pillboxes with just a few compartments. Or you can buy larger ones. If you use a pillbox, keep one pill in its original bottle. Then if you forget what a pill is for, you can find the bottle it came from. · Remind yourself. Get sticky notes, and make reminders to take your medicine. Post them near clocks or on the bathroom mirror. Use a wristwatch with an alarm. You can set it when you need to take your medicine. · Take the medicine when you do a daily task. For instance, you can take it when you brush your teeth or make your coffee. · Talk with your doctor about what you should do if you miss a dose. Find out what to do for each medicine. It may be different for each one. Where can you learn more? Go to http://luzmaria-bradly.info/. Enter A076 in the search box to learn more about \"Using Your Medicines: Care Instructions. \"  Current as of: August 14, 2016  Content Version: 11.4  © 0520-5172 Healthwise, Incorporated. Care instructions adapted under license by [employer] (which disclaims liability or warranty for this information). If you have questions about a medical condition or this instruction, always ask your healthcare professional. [address] any warranty or liability for your use of this information.

## 2018-07-11 NOTE — PROGRESS NOTES
Tracie Pabon is a 58 y.o.  male and presents with Medication Evaluation (flonase recall) and Allergic Rhinitis      SUBJECTIVE:  Patient has seasonal allergic rhinitis. He uses Flonase nasal spray for this condition. Recently there was a recall on his Flonase nasal spray due to concern about glass particles in the container. He himself has not had any issues such as nasal mucosal bleeding or irritation. His actual Flonase inhalers have been able to spray showing that no glass was blocking the actuator. Patient was reassured that no further testing needed to be done at this time to see if he had any glass ingestion. Half of the 10 minutes spent with patient was in reference to counseling about the above situation. Respiratory ROS: negative for - shortness of breath  Cardiovascular ROS: negative for - chest pain    Current Outpatient Prescriptions   Medication Sig    alfuzosin SR (UROXATRAL) 10 mg SR tablet Take 1 Tab by mouth daily. Indications: benign prostatic hyperplasia with lower urinary tract sx    chlorpheniramine-HYDROcodone (TUSSIONEX PENNKINETIC ER) 10-8 mg/5 mL suspension Take 5 mL by mouth every twelve (12) hours as needed for Cough. Max Daily Amount: 10 mL.  loratadine-pseudoephedrine (CLARITIN-D 24 HOUR)  mg per tablet Take 1 Tab by mouth daily.  fluticasone (FLONASE) 50 mcg/actuation nasal spray INSERT TWO SPRAYS IN EACH NOSTRIL EVERY DAY    CARTIA  mg ER capsule     flecainide (TAMBOCOR) 50 mg tablet 50 mg.    alfuzosin SR (UROXATRAL) 10 mg SR tablet TAKE ONE TABLET BY MOUTH ONCE DAILY    aspirin 81 mg chewable tablet 81 mg.    mesalamine EC (ASACOL) 400 mg EC tablet Take 400 mg by mouth three (3) times daily. No current facility-administered medications for this visit.           OBJECTIVE:  alert, well appearing, and in no distress  Visit Vitals    /77 (BP 1 Location: Left arm, BP Patient Position: Sitting)    Pulse 78    Temp 99.1 °F (37.3 °C) (Oral)    Resp 20    Ht 6' (1.829 m)    Wt 211 lb (95.7 kg)    SpO2 95%    BMI 28.62 kg/m2      well developed and well nourished        Assessment/Plan      ICD-10-CM ICD-9-CM    1. Seasonal allergic rhinitis due to pollen J30.1 477.0  controlled on Flonase. Recall as above. Patient does not need any further evaluation at this time. Follow-up Disposition:  Return if symptoms worsen or fail to improve. Reviewed plan of care. Patient has provided input and agrees with goals.

## 2018-08-01 ENCOUNTER — HOSPITAL ENCOUNTER (OUTPATIENT)
Dept: LAB | Age: 63
Discharge: HOME | End: 2018-08-01
Payer: COMMERCIAL

## 2018-08-01 DIAGNOSIS — R73.9 HIGH BLOOD SUGAR: ICD-10-CM

## 2018-08-01 DIAGNOSIS — I10 ESSENTIAL HYPERTENSION: ICD-10-CM

## 2018-08-01 LAB
ALBUMIN SERPL-MCNC: 3.8 G/DL (ref 3.4–5)
ALBUMIN/GLOB SERPL: 1.1 {RATIO} (ref 0.8–1.7)
ALP SERPL-CCNC: 116 U/L (ref 45–117)
ALT SERPL-CCNC: 36 U/L (ref 16–61)
ANION GAP SERPL CALC-SCNC: 4 MMOL/L (ref 3–18)
AST SERPL-CCNC: 16 U/L (ref 15–37)
BILIRUB SERPL-MCNC: 0.3 MG/DL (ref 0.2–1)
BUN SERPL-MCNC: 10 MG/DL (ref 7–18)
BUN/CREAT SERPL: 10 (ref 12–20)
CALCIUM SERPL-MCNC: 9.5 MG/DL (ref 8.5–10.1)
CHLORIDE SERPL-SCNC: 108 MMOL/L (ref 100–108)
CHOLEST SERPL-MCNC: 173 MG/DL
CO2 SERPL-SCNC: 31 MMOL/L (ref 21–32)
CREAT SERPL-MCNC: 1.03 MG/DL (ref 0.6–1.3)
GLOBULIN SER CALC-MCNC: 3.4 G/DL (ref 2–4)
GLUCOSE SERPL-MCNC: 96 MG/DL (ref 74–99)
HBA1C MFR BLD: 6.3 % (ref 4.2–5.6)
HDLC SERPL-MCNC: 42 MG/DL (ref 40–60)
HDLC SERPL: 4.1 {RATIO} (ref 0–5)
LDLC SERPL CALC-MCNC: 105.2 MG/DL (ref 0–100)
LIPID PROFILE,FLP: ABNORMAL
POTASSIUM SERPL-SCNC: 4.1 MMOL/L (ref 3.5–5.5)
PROT SERPL-MCNC: 7.2 G/DL (ref 6.4–8.2)
SODIUM SERPL-SCNC: 143 MMOL/L (ref 136–145)
TRIGL SERPL-MCNC: 129 MG/DL (ref ?–150)
VLDLC SERPL CALC-MCNC: 25.8 MG/DL

## 2018-08-01 PROCEDURE — 80053 COMPREHEN METABOLIC PANEL: CPT | Performed by: INTERNAL MEDICINE

## 2018-08-01 PROCEDURE — 80061 LIPID PANEL: CPT | Performed by: INTERNAL MEDICINE

## 2018-08-01 PROCEDURE — 36415 COLL VENOUS BLD VENIPUNCTURE: CPT | Performed by: INTERNAL MEDICINE

## 2018-08-01 PROCEDURE — 83036 HEMOGLOBIN GLYCOSYLATED A1C: CPT | Performed by: INTERNAL MEDICINE

## 2018-08-08 ENCOUNTER — OFFICE VISIT (OUTPATIENT)
Dept: FAMILY MEDICINE CLINIC | Age: 63
End: 2018-08-08

## 2018-08-08 VITALS
TEMPERATURE: 98.2 F | WEIGHT: 214.4 LBS | HEIGHT: 72 IN | OXYGEN SATURATION: 98 % | BODY MASS INDEX: 29.04 KG/M2 | HEART RATE: 61 BPM | DIASTOLIC BLOOD PRESSURE: 90 MMHG | SYSTOLIC BLOOD PRESSURE: 154 MMHG | RESPIRATION RATE: 16 BRPM

## 2018-08-08 DIAGNOSIS — R73.03 PREDIABETES: ICD-10-CM

## 2018-08-08 DIAGNOSIS — I10 ESSENTIAL HYPERTENSION: Primary | ICD-10-CM

## 2018-08-08 DIAGNOSIS — I48.20 CHRONIC ATRIAL FIBRILLATION (HCC): ICD-10-CM

## 2018-08-08 DIAGNOSIS — J30.1 SEASONAL ALLERGIC RHINITIS DUE TO POLLEN: ICD-10-CM

## 2018-08-08 RX ORDER — FLUTICASONE PROPIONATE 50 MCG
SPRAY, SUSPENSION (ML) NASAL
Qty: 1 BOTTLE | Refills: 4 | Status: SHIPPED | OUTPATIENT
Start: 2018-08-08 | End: 2019-01-29 | Stop reason: SDUPTHER

## 2018-08-08 RX ORDER — DILTIAZEM HYDROCHLORIDE 240 MG/1
240 CAPSULE, COATED, EXTENDED RELEASE ORAL DAILY
COMMUNITY
Start: 2018-08-08

## 2018-08-08 NOTE — MR AVS SNAPSHOT
[address] [facility] [address] 
 
 
 [address] [id number] [address] [address] 
[phone] Patient: Tal Gomez Sr 
MRN:  [id number] Visit Information Date & Time Provider Department Dept. Phone Encounter #  
 8/8/2018  9:15 AM Raymond Crabtree [address] [place] [address] Primary Care [phone] [id number] Follow-up Instructions Return in about 6 months (around 2/8/2019) for labs 1 week before. Your Appointments 4/9/2019  9:00 AM  
ULTRASOUND with Jina Bence, MD  
[facility] Appt Note: PVR/PSA  
 [address] Harpreet A [address] [address]  
[phone] [address] [facility] [address] Upcoming Health Maintenance Date Due DTaP/Tdap/Td series (1 - Tdap) 10/4/1976 ZOSTER VACCINE AGE 60> 8/4/2015 Influenza Age 5 to Adult 8/1/2018 COLONOSCOPY 11/9/2025 Allergies as of 8/8/2018  Review Complete On: 8/8/2018 By: Raymond Crabtree MD  
  
 Severity Noted Reaction Type Reactions Iron  05/23/2016    Nausea Only Current Immunizations  Reviewed on 12/7/2015 Name Date  
 TB Skin Test (PPD) Intradermal 12/7/2015 Not reviewed this visit You Were Diagnosed With   
  
 Codes Comments Essential hypertension    -  Primary ICD-10-CM: I10 
ICD-9-CM: 401.9 Seasonal allergic rhinitis due to pollen     ICD-10-CM: J30.1 ICD-9-CM: 477.0 Prediabetes     ICD-10-CM: R73.03 
ICD-9-CM: 790.29 Chronic atrial fibrillation (HCC)     ICD-10-CM: G18.7 ICD-9-CM: 427.31 Vitals BP Pulse Temp Resp Height(growth percentile) Weight(growth percentile) 154/90 (BP 1 Location: Left arm) 61 98.2 °F (36.8 °C) (Oral) 16 6' (1.829 m) 214 lb 6.4 oz (97.3 kg) SpO2 BMI Smoking Status 98% 29.08 kg/m2 Former Smoker BMI and BSA Data Body Mass Index Body Surface Area 29.08 kg/m 2 2.22 m 2 Preferred Pharmacy Pharmacy Name Phone Josiah Wills [address] [facility], [address] [phone] Your Updated Medication List  
  
   
This list is accurate as of 8/8/18  9:57 AM.  Always use your most recent med list.  
  
  
  
  
 * alfuzosin SR 10 mg SR tablet Commonly known as:  UROXATRAL  
TAKE ONE TABLET BY MOUTH ONCE DAILY  
  
 * alfuzosin SR 10 mg SR tablet Commonly known as:  Sueanne Long Take 1 Tab by mouth daily. Indications: benign prostatic hyperplasia with lower urinary tract sx ASACOL 400 mg EC tablet Generic drug:  mesalamine EC Take 400 mg by mouth three (3) times daily. aspirin 81 mg chewable tablet 81 mg.  
  
 chlorpheniramine-HYDROcodone 10-8 mg/5 mL suspension Commonly known as:  Ciara Arthur ER Take 5 mL by mouth every twelve (12) hours as needed for Cough. Max Daily Amount: 10 mL. dilTIAZem  mg ER capsule Commonly known as:  CARDIZEM CD Take 1 Cap by mouth daily. flecainide 50 mg tablet Commonly known as:  TAMBOCOR 50 mg.  
  
 fluticasone 50 mcg/actuation nasal spray Commonly known as:  Patrizia Serene INSERT TWO SPRAYS IN EACH NOSTRIL EVERY DAY  
  
 loratadine-pseudoephedrine  mg per tablet Commonly known as:  CLARITIN-D 24 HOUR Take 1 Tab by mouth daily. * Notice: This list has 2 medication(s) that are the same as other medications prescribed for you. Read the directions carefully, and ask your doctor or other care provider to review them with you. Prescriptions Sent to Pharmacy Refills  
 fluticasone (FLONASE) 50 mcg/actuation nasal spray 4 Sig: INSERT TWO SPRAYS IN EACH NOSTRIL EVERY DAY Class: Normal  
 Pharmacy: [facility] [address] LILLIANA OCHOA [address] [facility], [facility] Ph #: [phone] Follow-up Instructions Return in about 6 months (around 2/8/2019) for labs 1 week before. Patient Instructions DASH Diet: Care Instructions Your Care Instructions The DASH diet is an eating plan that can help lower your blood pressure. DASH stands for Dietary Approaches to Stop Hypertension. Hypertension is high blood pressure. The DASH diet focuses on eating foods that are high in calcium, potassium, and magnesium. These nutrients can lower blood pressure. The foods that are highest in these nutrients are fruits, vegetables, low-fat dairy products, nuts, seeds, and legumes. But taking calcium, potassium, and magnesium supplements instead of eating foods that are high in those nutrients does not have the same effect. The DASH diet also includes whole grains, fish, and poultry. The DASH diet is one of several lifestyle changes your doctor may recommend to lower your high blood pressure. Your doctor may also want you to decrease the amount of sodium in your diet. Lowering sodium while following the DASH diet can lower blood pressure even further than just the DASH diet alone. Follow-up care is a key part of your treatment and safety. Be sure to make and go to all appointments, and call your doctor if you are having problems. It's also a good idea to know your test results and keep a list of the medicines you take. How can you care for yourself at home? Following the DASH diet · Eat 4 to 5 servings of fruit each day. A serving is 1 medium-sized piece of fruit, ½ cup chopped or canned fruit, 1/4 cup dried fruit, or 4 ounces (½ cup) of fruit juice. Choose fruit more often than fruit juice. · Eat 4 to 5 servings of vegetables each day. A serving is 1 cup of lettuce or raw leafy vegetables, ½ cup of chopped or cooked vegetables, or 4 ounces (½ cup) of vegetable juice. Choose vegetables more often than vegetable juice. · Get 2 to 3 servings of low-fat and fat-free dairy each day. A serving is 8 ounces of milk, 1 cup of yogurt, or 1 ½ ounces of cheese. · Eat 6 to 8 servings of grains each day.  A serving is 1 slice of bread, 1 ounce of dry cereal, or ½ cup of cooked rice, pasta, or cooked cereal. Try to choose whole-grain products as much as possible. · Limit lean meat, poultry, and fish to 2 servings each day. A serving is 3 ounces, about the size of a deck of cards. · Eat 4 to 5 servings of nuts, seeds, and legumes (cooked dried beans, lentils, and split peas) each week. A serving is 1/3 cup of nuts, 2 tablespoons of seeds, or ½ cup of cooked beans or peas. · Limit fats and oils to 2 to 3 servings each day. A serving is 1 teaspoon of vegetable oil or 2 tablespoons of salad dressing. · Limit sweets and added sugars to 5 servings or less a week. A serving is 1 tablespoon jelly or jam, ½ cup sorbet, or 1 cup of lemonade. · Eat less than 2,300 milligrams (mg) of sodium a day. If you limit your sodium to 1,500 mg a day, you can lower your blood pressure even more. Tips for success · Start small. Do not try to make dramatic changes to your diet all at once. You might feel that you are missing out on your favorite foods and then be more likely to not follow the plan. Make small changes, and stick with them. Once those changes become habit, add a few more changes. · Try some of the following: ¨ Make it a goal to eat a fruit or vegetable at every meal and at snacks. This will make it easy to get the recommended amount of fruits and vegetables each day. ¨ Try yogurt topped with fruit and nuts for a snack or healthy dessert. ¨ Add lettuce, tomato, cucumber, and onion to sandwiches. ¨ Combine a ready-made pizza crust with low-fat mozzarella cheese and lots of vegetable toppings. Try using tomatoes, squash, spinach, broccoli, carrots, cauliflower, and onions. ¨ Have a variety of cut-up vegetables with a low-fat dip as an appetizer instead of chips and dip. ¨ Sprinkle sunflower seeds or chopped almonds over salads. Or try adding chopped walnuts or almonds to cooked vegetables. ¨ Try some vegetarian meals using beans and peas. Add garbanzo or kidney beans to salads. Make burritos and tacos with mashed mcrae beans or black beans. Where can you learn more? Go to http://luzmaria-bradly.info/. Enter D092 in the search box to learn more about \"DASH Diet: Care Instructions. \" Current as of: December 6, 2017 Content Version: 11.7 © 8883-8447 [employer]. Care instructions adapted under license by [employer] (which disclaims liability or warranty for this information). If you have questions about a medical condition or this instruction, always ask your healthcare professional. [place] [address] any warranty or liability for your use of this information. Introducing [facility]! [employer] introduces [employer] patient portal. Now you can access parts of your medical record, email your doctor's office, and request medication refills online. 1. In your internet browser, go to [email or web address] 2. Click on the First Time User? Click Here link in the Sign In box. You will see the New Member Sign Up page. 3. Enter your [employer] Access Code exactly as it appears below. You will not need to use this code after youve completed the sign-up process. If you do not sign up before the expiration date, you must request a new code. · [employer] Access Code: [email or web address] Expires: 10/1/2018  5:11 PM 
 
4. Enter the last four digits of your Social Security Number (xxxx) and Date of Birth (mm/dd/yyyy) as indicated and click Submit. You will be taken to the next sign-up page. 5. Create a [employer] ID. This will be your [employer] login ID and cannot be changed, so think of one that is secure and easy to remember. 6. Create a [employer] password. You can change your password at any time. 7. Enter your Password Reset Question and Answer. This can be used at a later time if you forget your password. 8. Enter your e-mail address. You will receive e-mail notification when new information is available in [address] 9. Click Sign Up. You can now view and download portions of your medical record. 10. Click the Download Summary menu link to download a portable copy of your medical information. If you have questions, please visit the Frequently Asked Questions section of the [employer] website. Remember, [employer] is NOT to be used for urgent needs. For medical emergencies, dial 911. Now available from your iPhone and Android! Please provide this summary of care documentation to your next provider. Your primary care clinician is listed as YASMANI MORALES. If you have any questions after today's visit, please call [phone].

## 2018-08-08 NOTE — PROGRESS NOTES
Cruz Calles Sr is a 58 y.o.  male and presents with Hypertension; Allergic Rhinitis; High Blood Sugar; and Irregular Heart Beat      SUBJECTIVE:  Pt's  BP is borderline controlled on on Cardizem 240 mg. He denies any fatigue. He is followed by Cardiology for his Afib, Pt's BPH and LUTS are fairly well controlled on Uroxatral. Pt is followed by urology. Pt is followed by GI for his Chron's disease. Pt continues to try and cut back the sweats and carbs in his diet to improve his prediabetes. Allergies are well controlled on Claritin-D and Flonase. Respiratory ROS: negative for - shortness of breath  Cardiovascular ROS: negative for - chest pain    Current Outpatient Prescriptions   Medication Sig    fluticasone (FLONASE) 50 mcg/actuation nasal spray INSERT TWO SPRAYS IN EACH NOSTRIL EVERY DAY    dilTIAZem CD (CARDIZEM CD) 240 mg ER capsule Take 1 Cap by mouth daily.  chlorpheniramine-HYDROcodone (TUSSIONEX PENNKINETIC ER) 10-8 mg/5 mL suspension Take 5 mL by mouth every twelve (12) hours as needed for Cough. Max Daily Amount: 10 mL.  loratadine-pseudoephedrine (CLARITIN-D 24 HOUR)  mg per tablet Take 1 Tab by mouth daily.  flecainide (TAMBOCOR) 50 mg tablet 50 mg.    alfuzosin SR (UROXATRAL) 10 mg SR tablet TAKE ONE TABLET BY MOUTH ONCE DAILY    aspirin 81 mg chewable tablet 81 mg.    mesalamine EC (ASACOL) 400 mg EC tablet Take 400 mg by mouth three (3) times daily.  alfuzosin SR (UROXATRAL) 10 mg SR tablet Take 1 Tab by mouth daily. Indications: benign prostatic hyperplasia with lower urinary tract sx     No current facility-administered medications for this visit.           OBJECTIVE:  alert, well appearing, and in no distress  Visit Vitals    /90 (BP 1 Location: Left arm)    Pulse 61    Temp 98.2 °F (36.8 °C) (Oral)    Resp 16    Ht 6' (1.829 m)    Wt 214 lb 6.4 oz (97.3 kg)    SpO2 98%    BMI 29.08 kg/m2      well developed and well nourished  Chest - clear to auscultation, no wheezes, rales or rhonchi, symmetric air entry  Heart - normal rate, regular rhythm, normal S1, S2, no murmurs, rubs, clicks or gallops  Extremities - peripheral pulses normal, no pedal edema, no clubbing or cyanosis    Labs:   Lab Results   Component Value Date/Time    Cholesterol, total 173 08/01/2018 09:28 AM    HDL Cholesterol 42 08/01/2018 09:28 AM    LDL, calculated 105.2 (H) 08/01/2018 09:28 AM    Triglyceride 129 08/01/2018 09:28 AM    CHOL/HDL Ratio 4.1 08/01/2018 09:28 AM     Lab Results   Component Value Date/Time    ALT (SGPT) 36 08/01/2018 09:28 AM    AST (SGOT) 16 08/01/2018 09:28 AM    Alk. phosphatase 116 08/01/2018 09:28 AM    Bilirubin, total 0.3 08/01/2018 09:28 AM     Lab Results   Component Value Date/Time    GFR est AA >60 08/01/2018 09:28 AM    GFR est non-AA >60 08/01/2018 09:28 AM    Creatinine 1.03 08/01/2018 09:28 AM    BUN 10 08/01/2018 09:28 AM    Sodium 143 08/01/2018 09:28 AM    Potassium 4.1 08/01/2018 09:28 AM    Chloride 108 08/01/2018 09:28 AM    CO2 31 08/01/2018 09:28 AM      Lab Results   Component Value Date/Time    Prostate Specific Ag 3.6 04/09/2018 08:58 AM    Prostate Specific Ag 2.5 02/16/2017 09:14 AM    Prostate Specific Ag 2.5 02/18/2016 03:00 PM    Prostate Specific Ag 2.1 02/04/2015 08:53 AM    Prostate Specific Ag 2.0 01/27/2014 08:56 AM    Prostate Specific Ag 1.4 01/14/2013 09:46 AM     Lab Results   Component Value Date/Time    Glucose 96 08/01/2018 09:28 AM      Labs:   Lab Results   Component Value Date/Time    Hemoglobin A1c 6.3 (H) 08/01/2018 09:28 AM    Hemoglobin A1c 6.2 (H) 08/16/2017 11:01 AM    Hemoglobin A1c 5.7 (H) 02/16/2017 09:14 AM    Glucose 96 08/01/2018 09:28 AM    LDL, calculated 105.2 (H) 08/01/2018 09:28 AM    Creatinine 1.03 08/01/2018 09:28 AM          Assessment/Plan      ICD-10-CM ICD-9-CM    1. Essential hypertension I10 401.9 Borderline controlled on resume  mg. Patient will try improve with diet and exercise. LIPID PANEL      METABOLIC PANEL, COMPREHENSIVE   2. Seasonal allergic rhinitis due to pollen J30.1 477.0  well-controlled on Flonase and Claritin-D   3. Prediabetes R73.03 790.29  from work and moving his prediabetes with diet and weight loss. HEMOGLOBIN A1C W/O EAG   4. Chronic atrial fibrillation (HCC) I48.2 427.31  stable on flecainide and Cardizem CD. Patient followed by cardiology       Follow-up Disposition:  Return in about 6 months (around 2/8/2019) for labs 1 week before. Reviewed plan of care. Patient has provided input and agrees with goals.

## 2018-08-08 NOTE — PATIENT INSTRUCTIONS

## 2018-08-08 NOTE — PROGRESS NOTES
Pt is here for 6 month f/u HTN. Labs done    1. Have you been to the ER, urgent care clinic since your last visit? Hospitalized since your last visit? No    2. Have you seen or consulted any other health care providers outside of the [facility] since your last visit? Include any pap smears or colon screening.  No

## 2018-11-26 ENCOUNTER — OFFICE VISIT (OUTPATIENT)
Dept: FAMILY MEDICINE CLINIC | Age: 63
End: 2018-11-26

## 2018-11-26 VITALS
DIASTOLIC BLOOD PRESSURE: 73 MMHG | OXYGEN SATURATION: 96 % | HEIGHT: 72 IN | TEMPERATURE: 99.2 F | RESPIRATION RATE: 20 BRPM | BODY MASS INDEX: 29.39 KG/M2 | WEIGHT: 217 LBS | SYSTOLIC BLOOD PRESSURE: 154 MMHG | HEART RATE: 69 BPM

## 2018-11-26 DIAGNOSIS — R05.9 COUGH: ICD-10-CM

## 2018-11-26 DIAGNOSIS — J01.90 ACUTE NON-RECURRENT SINUSITIS, UNSPECIFIED LOCATION: Primary | ICD-10-CM

## 2018-11-26 RX ORDER — CODEINE PHOSPHATE AND GUAIFENESIN 10; 100 MG/5ML; MG/5ML
10 SOLUTION ORAL
Qty: 120 ML | Refills: 0 | Status: SHIPPED | OUTPATIENT
Start: 2018-11-26 | End: 2019-02-05 | Stop reason: ALTCHOICE

## 2018-11-26 RX ORDER — AMOXICILLIN AND CLAVULANATE POTASSIUM 875; 125 MG/1; MG/1
TABLET, FILM COATED ORAL EVERY 12 HOURS
COMMUNITY
End: 2019-01-21

## 2018-11-26 RX ORDER — AZITHROMYCIN 250 MG/1
TABLET, FILM COATED ORAL
Qty: 6 TAB | Refills: 0 | Status: SHIPPED | OUTPATIENT
Start: 2018-11-26 | End: 2018-12-01

## 2018-11-26 RX ORDER — MONTELUKAST SODIUM 10 MG/1
10 TABLET ORAL DAILY
Qty: 30 TAB | Refills: 5 | Status: SHIPPED | OUTPATIENT
Start: 2018-11-26 | End: 2019-07-11

## 2018-11-26 NOTE — PROGRESS NOTES
Patient is in the office today for cold symptoms. 1. Have you been to the ER, urgent care clinic since your last visit? Hospitalized since your last visit? No    2. Have you seen or consulted any other health care providers outside of the [address] [place] [address] since your last visit? Include any pap smears or colon screening.  No

## 2018-11-26 NOTE — PROGRESS NOTES
HISTORY OF PRESENT ILLNESS  Kendall Ayers Sr is a 61 y.o. male. Cold Symptoms   The history is provided by the patient. This is a new problem. The current episode started more than 1 week ago (3 weeks). The problem occurs constantly. The cough is productive of sputum. There has been no fever. Associated symptoms include rhinorrhea and wheezing. Treatments tried: augmentin 875 mg BID from [facility], fritz Bai  The treatment provided mild relief. He is not a smoker. Review of Systems   HENT: Positive for rhinorrhea. Respiratory: Positive for wheezing. Physical Exam   Constitutional: He appears well-developed and well-nourished. HENT:   Right Ear: Tympanic membrane and ear canal normal.   Left Ear: Tympanic membrane and ear canal normal.   Nose: Mucosal edema present. Mouth/Throat: Uvula is midline, oropharynx is clear and moist and mucous membranes are normal.   Cardiovascular: Normal rate, regular rhythm and normal heart sounds. Pulmonary/Chest: Effort normal.   Vitals reviewed. ASSESSMENT and PLAN    ICD-10-CM ICD-9-CM    1. Acute non-recurrent sinusitis, unspecified location J01.90 461.9 Will treat with azithromycin (ZITHROMAX) 250 mg tablet   2. Cough R05 786.2 Will treat with guaiFENesin-codeine (CHERATUSSIN AC) 100-10 mg/5 mL solution      And montelukast (SINGULAIR) 10 mg tablet for possible allergies. Pt to continue Flonase      Reviewed plan of care. Patient has provided input and agrees with goals.

## 2019-01-21 ENCOUNTER — HOSPITAL ENCOUNTER (EMERGENCY)
Age: 64
Discharge: HOME | End: 2019-01-21
Attending: EMERGENCY MEDICINE
Payer: COMMERCIAL

## 2019-01-21 VITALS
DIASTOLIC BLOOD PRESSURE: 87 MMHG | OXYGEN SATURATION: 100 % | HEART RATE: 63 BPM | WEIGHT: 218 LBS | HEIGHT: 72 IN | TEMPERATURE: 98.7 F | SYSTOLIC BLOOD PRESSURE: 139 MMHG | BODY MASS INDEX: 29.53 KG/M2 | RESPIRATION RATE: 20 BRPM

## 2019-01-21 DIAGNOSIS — J01.00 ACUTE NON-RECURRENT MAXILLARY SINUSITIS: Primary | ICD-10-CM

## 2019-01-21 PROCEDURE — 99283 EMERGENCY DEPT VISIT LOW MDM: CPT

## 2019-01-21 RX ORDER — AMOXICILLIN AND CLAVULANATE POTASSIUM 875; 125 MG/1; MG/1
1 TABLET, FILM COATED ORAL 2 TIMES DAILY
Qty: 14 TAB | Refills: 0 | Status: SHIPPED | OUTPATIENT
Start: 2019-01-21 | End: 2019-01-28

## 2019-01-21 NOTE — DISCHARGE INSTRUCTIONS
Patient Education   1. Return if any concerns or worsening of condition(s)  2. Augmentin as prescribed until finished. 3.  Over the counter flonase and claritin for the next week and then stop. If sinus congestion recurs, then restart the flonase and claritin for a week at a time. 4.  Use a Nedi Pot to help clear your sinuses. You may purchase the original Nedi Pot at a pharmacy or get a generic version at [facility] or [employer] stores. 5.  Take over the counter ibuprofen for pain and fever as needed as directed. 6.  FOLLOW UP APPOINTMENT:  Your primary doctor in 1 week. Saline Nasal Washes: Care Instructions  Your Care Instructions  Saline nasal washes help keep the nasal passages open by washing out thick or dried mucus. This simple remedy can help relieve symptoms of allergies, sinusitis, and colds. It also can make the nose feel more comfortable by keeping the mucous membranes moist. You may notice a little burning sensation in your nose the first few times you use the solution, but this usually gets better in a few days. Follow-up care is a key part of your treatment and safety. Be sure to make and go to all appointments, and call your doctor if you are having problems. It's also a good idea to know your test results and keep a list of the medicines you take. How can you care for yourself at home? · You can buy premixed saline solution in a squeeze bottle or other sinus rinse products at a drugstore. Read and follow the instructions on the label. · You also can make your own saline solution by adding 1 teaspoon of salt and 1 teaspoon of baking soda to 2 cups of distilled water. · If you use a homemade solution, pour a small amount into a clean bowl. Using a rubber bulb syringe, squeeze the syringe and place the tip in the salt water. Pull a small amount of the salt water into the syringe by relaxing your hand. · Sit down with your head tilted slightly back. Do not lie down.  Put the tip of the bulb syringe or the squeeze bottle a little way into one of your nostrils. Gently drip or squirt a few drops into the nostril. Repeat with the other nostril. Some sneezing and gagging are normal at first.  · Gently blow your nose. · Wipe the syringe or bottle tip clean after each use. · Repeat this 2 or 3 times a day. · Use nasal washes gently if you have nosebleeds often. When should you call for help? Watch closely for changes in your health, and be sure to contact your doctor if:    · You often get nosebleeds.     · You have problems doing the nasal washes. Where can you learn more? Go to http://luzmaria-bradly.info/. Enter [id number] in the search box to learn more about \"Saline Nasal Washes: Care Instructions. \"  Current as of: March 27, 2018  Content Version: 11.9  © 7108-3288 [employer]. Care instructions adapted under license by [employer] (which disclaims liability or warranty for this information). If you have questions about a medical condition or this instruction, always ask your healthcare professional. [place] [address] any warranty or liability for your use of this information. Patient Education        Sinusitis: Care Instructions  Your Care Instructions    Sinusitis is an infection of the lining of the sinus cavities in your head. Sinusitis often follows a cold. It causes pain and pressure in your head and face. In most cases, sinusitis gets better on its own in 1 to 2 weeks. But some mild symptoms may last for several weeks. Sometimes antibiotics are needed. Follow-up care is a key part of your treatment and safety. Be sure to make and go to all appointments, and call your doctor if you are having problems. It's also a good idea to know your test results and keep a list of the medicines you take. How can you care for yourself at home?   · Take an over-the-counter pain medicine, such as acetaminophen (Tylenol), ibuprofen (Advil, Motrin), or naproxen (Aleve). Read and follow all instructions on the label. · If the doctor prescribed antibiotics, take them as directed. Do not stop taking them just because you feel better. You need to take the full course of antibiotics. · Be careful when taking over-the-counter cold or flu medicines and Tylenol at the same time. Many of these medicines have acetaminophen, which is Tylenol. Read the labels to make sure that you are not taking more than the recommended dose. Too much acetaminophen (Tylenol) can be harmful. · Breathe warm, moist air from a steamy shower, a hot bath, or a sink filled with hot water. Avoid cold, dry air. Using a humidifier in your home may help. Follow the directions for cleaning the machine. · Use saline (saltwater) nasal washes to help keep your nasal passages open and wash out mucus and bacteria. You can buy saline nose drops at a grocery store or drugstore. Or you can make your own at home by adding 1 teaspoon of salt and 1 teaspoon of baking soda to 2 cups of distilled water. If you make your own, fill a bulb syringe with the solution, insert the tip into your nostril, and squeeze gently. [employer] your nose. · Put a hot, wet towel or a warm gel pack on your face 3 or 4 times a day for 5 to 10 minutes each time. · Try a decongestant nasal spray like oxymetazoline (Afrin). Do not use it for more than 3 days in a row. Using it for more than 3 days can make your congestion worse. When should you call for help? Call your doctor now or seek immediate medical care if:    · You have new or worse swelling or redness in your face or around your eyes.     · You have a new or higher fever.    Watch closely for changes in your health, and be sure to contact your doctor if:    · You have new or worse facial pain.     · The mucus from your nose becomes thicker (like pus) or has new blood in it.     · You are not getting better as expected.    Where can you learn more?  Go to http://luzmaria-bradly.info/. Enter I046 in the search box to learn more about \"Sinusitis: Care Instructions. \"  Current as of: March 27, 2018  Content Version: 11.9  © 3391-0027 [employer]. Care instructions adapted under license by [employer] (which disclaims liability or warranty for this information). If you have questions about a medical condition or this instruction, always ask your healthcare professional. [place] [address] any warranty or liability for your use of this information.

## 2019-01-21 NOTE — ED NOTES
Patient armband removed and shreddedI have reviewed discharge instructions with the patient. The patient verbalized understanding.  rx x 1 given;

## 2019-01-21 NOTE — ED PROVIDER NOTES
The history is provided by the patient. Sinus Pain This is a new problem. Episode onset: 4 days ago. The problem has been gradually worsening. There has been no fever. The pain is moderate. The pain has been worsening since onset. Associated symptoms include congestion, sinus pressure and rhinorrhea. Pertinent negatives include no chills, no sweats, no ear pain, no hoarse voice, no sore throat, no swollen glands, no cough, no shortness of breath, no neck pain, no neck pain, no headaches and no chest pain. Treatments tried: mucinex, vicks nasal spray. The treatment provided moderate relief. Past Medical History:  
Diagnosis Date  A-fib ([facility]  BPH (benign prostatic hypertrophy) with urinary obstruction 2010  Crohn's disease ([facility]  Crohn's disease of the colon 2011  Dyspnea  ED (erectile dysfunction) 2010  Hypertension History reviewed. No pertinent surgical history. History reviewed. No pertinent family history. Social History Socioeconomic History  Marital status:  Spouse name: Not on file  Number of children: Not on file  Years of education: Not on file  Highest education level: Not on file Social Needs  Financial resource strain: Not on file  Food insecurity - worry: Not on file  Food insecurity - inability: Not on file  Transportation needs - medical: Not on file  Transportation needs - non-medical: Not on file Occupational History  Not on file Tobacco Use  Smoking status: Former Smoker Last attempt to quit: 1990 Years since quittin.6  Smokeless tobacco: Never Used Substance and Sexual Activity  Alcohol use: No  
 Drug use: No  
 Sexual activity: Yes  
  Partners: Female Other Topics Concern  Not on file Social History Narrative  Not on file ALLERGIES: Iron Review of Systems Constitutional: Negative for chills and fever. HENT: Positive for congestion, postnasal drip, rhinorrhea, sinus pressure and sinus pain. Negative for ear pain, hoarse voice and sore throat. Eyes: Negative for pain and redness. Respiratory: Negative for cough and shortness of breath. Cardiovascular: Negative for chest pain. Gastrointestinal: Negative for abdominal pain, constipation, diarrhea, nausea and vomiting. Genitourinary: Negative for dysuria. Musculoskeletal: Negative for neck pain and neck stiffness. Skin: Negative. Neurological: Negative for dizziness, weakness, light-headedness, numbness and headaches. Psychiatric/Behavioral: Negative. All other systems reviewed and are negative. Vitals:  
 01/21/19 1354 BP: 139/87 Pulse: 63 Resp: 20 Temp: 98.7 °F (37.1 °C) SpO2: 100% Weight: 98.9 kg (218 lb) Height: 5' 11.5\" (1.816 m) Physical Exam  
Constitutional: He is oriented to person, place, and time. He appears well-developed and well-nourished. No distress. HENT:  
Head: Normocephalic and atraumatic. Right Ear: Tympanic membrane, external ear and ear canal normal.  
Left Ear: Tympanic membrane, external ear and ear canal normal.  
Nose: Rhinorrhea present. Right sinus exhibits maxillary sinus tenderness. Right sinus exhibits no frontal sinus tenderness. Left sinus exhibits maxillary sinus tenderness. Left sinus exhibits no frontal sinus tenderness. Mouth/Throat: Uvula is midline, oropharynx is clear and moist and mucous membranes are normal. No uvula swelling. No oropharyngeal exudate, posterior oropharyngeal edema, posterior oropharyngeal erythema or tonsillar abscesses. Eyes: Conjunctivae and EOM are normal. Pupils are equal, round, and reactive to light. Neck: Normal range of motion. Neck supple. Cardiovascular: Normal rate, regular rhythm and normal heart sounds. Exam reveals no gallop and no friction rub. No murmur heard. Pulmonary/Chest: Effort normal and breath sounds normal. No stridor. No respiratory distress. He has no wheezes. He has no rales. Abdominal: Soft. Bowel sounds are normal. There is no tenderness. Musculoskeletal: Normal range of motion. Lymphadenopathy:  
  He has no cervical adenopathy. Neurological: He is alert and oriented to person, place, and time. Skin: Skin is warm and dry. He is not diaphoretic. Psychiatric: He has a normal mood and affect. His behavior is normal. Judgment and thought content normal.  
Nursing note and vitals reviewed. MDM Number of Diagnoses or Management Options Diagnosis management comments: DDx:  viral vs bacterial URI, sinusitis, influenza, asthma exacerbation, COPD, bronchitis, PNE, allergies DIAGNOSIS: 
1. Acute sinusitis Plan/discharge instructions: 1. Return if any concerns or worsening of condition(s) 2. Augmentin as prescribed until finished. 3.  Over the counter flonase and claritin for the next week and then stop. If sinus congestion recurs, then restart the flonase and claritin for a week at a time. 4.  Use a Nedi Pot to help clear your sinuses. You may purchase the original Nedi Pot at a pharmacy or get a generic version at [employer] or [employer]. 5.  Take over the counter ibuprofen for pain and fever as needed as directed. 6.  FOLLOW UP APPOINTMENT:  Your primary doctor in 1 week. Pt results have been reviewed with them. They have been counseled regarding diagnosis, treatment, and plan. Pt verbally conveys understanding and agreement of the signs, symptoms, diagnosis, treatment and prognosis and additionally agrees to follow up as discussed. Pt also agrees with the care-plan and conveys that all of their questions have been answered.  I have also provided discharge instructions for them that include: educational information regarding their diagnosis and treatment, and list of reasons why they would want to return to the ED prior to their follow-up appointment, should their condition change. Amount and/or Complexity of Data Reviewed Review and summarize past medical records: yes Discuss the patient with other providers: yes Risk of Complications, Morbidity, and/or Mortality Presenting problems: low Diagnostic procedures: low Management options: low Patient Progress Patient progress: stable Procedures Diagnosis: 1. Acute non-recurrent maxillary sinusitis Disposition: Discharge to home. Follow-up Information Follow up With Specialties Details Why Contact Info [id number] [facility] EMERGENCY DEPT Emergency Medicine  As needed, If symptoms worsen [address] [place] [address] [phone] Drew Starr MD Internal Medicine Go in 2 days  [address] [facility] [address] [phone] Medication List  
  
CHANGE how you take these medications   
amoxicillin-clavulanate 875-125 mg per tablet Commonly known as:  AUGMENTIN Take 1 Tab by mouth two (2) times a day for 7 days. What changed:   
· how much to take · when to take this CONTINUE taking these medications   
alfuzosin SR 10 mg SR tablet Commonly known as:  Lonni Minors Take 1 Tab by mouth daily. Indications: benign prostatic hyperplasia with lower urinary tract sx ASACOL 400 mg EC tablet Generic drug:  mesalamine EC 
  
aspirin 81 mg chewable tablet 
  
dilTIAZem  mg ER capsule Commonly known as:  CARDIZEM CD 
  
flecainide 50 mg tablet Commonly known as:  TAMBOCOR 
  
fluticasone 50 mcg/actuation nasal spray Commonly known as:  Gabrielle Chester INSERT TWO SPRAYS IN EACH NOSTRIL EVERY DAY 
  
guaiFENesin-codeine 100-10 mg/5 mL solution Commonly known as:  Juan Anis Take 10 mL by mouth four (4) times daily as needed for Cough. Max Daily Amount: 40 mL. 
  
loratadine-pseudoephedrine  mg per tablet Commonly known as:  CLARITIN-D 24 HOUR Take 1 Tab by mouth daily. montelukast 10 mg tablet Commonly known as:  SINGULAIR Take 1 Tab by mouth daily. Where to Get Your Medications Information about where to get these medications is not yet available Ask your nurse or doctor about these medications · amoxicillin-clavulanate 875-125 mg per tablet

## 2019-01-29 ENCOUNTER — OFFICE VISIT (OUTPATIENT)
Dept: FAMILY MEDICINE CLINIC | Age: 64
End: 2019-01-29

## 2019-01-29 ENCOUNTER — HOSPITAL ENCOUNTER (OUTPATIENT)
Dept: LAB | Age: 64
Discharge: HOME | End: 2019-01-29
Payer: COMMERCIAL

## 2019-01-29 VITALS
DIASTOLIC BLOOD PRESSURE: 81 MMHG | HEART RATE: 69 BPM | TEMPERATURE: 98.2 F | WEIGHT: 219.8 LBS | OXYGEN SATURATION: 96 % | BODY MASS INDEX: 29.77 KG/M2 | SYSTOLIC BLOOD PRESSURE: 158 MMHG | HEIGHT: 72 IN | RESPIRATION RATE: 20 BRPM

## 2019-01-29 DIAGNOSIS — R73.03 PREDIABETES: ICD-10-CM

## 2019-01-29 DIAGNOSIS — J30.1 SEASONAL ALLERGIC RHINITIS DUE TO POLLEN: ICD-10-CM

## 2019-01-29 DIAGNOSIS — J01.90 ACUTE NON-RECURRENT SINUSITIS, UNSPECIFIED LOCATION: Primary | ICD-10-CM

## 2019-01-29 DIAGNOSIS — I10 ESSENTIAL HYPERTENSION: ICD-10-CM

## 2019-01-29 LAB
ALBUMIN SERPL-MCNC: 3.8 G/DL (ref 3.4–5)
ALBUMIN/GLOB SERPL: 1.1 {RATIO} (ref 0.8–1.7)
ALP SERPL-CCNC: 108 U/L (ref 45–117)
ALT SERPL-CCNC: 38 U/L (ref 16–61)
ANION GAP SERPL CALC-SCNC: 5 MMOL/L (ref 3–18)
AST SERPL-CCNC: 21 U/L (ref 15–37)
BILIRUB SERPL-MCNC: 0.3 MG/DL (ref 0.2–1)
BUN SERPL-MCNC: 11 MG/DL (ref 7–18)
BUN/CREAT SERPL: 11 (ref 12–20)
CALCIUM SERPL-MCNC: 9.5 MG/DL (ref 8.5–10.1)
CHLORIDE SERPL-SCNC: 106 MMOL/L (ref 100–108)
CHOLEST SERPL-MCNC: 160 MG/DL
CO2 SERPL-SCNC: 29 MMOL/L (ref 21–32)
CREAT SERPL-MCNC: 0.97 MG/DL (ref 0.6–1.3)
GLOBULIN SER CALC-MCNC: 3.4 G/DL (ref 2–4)
GLUCOSE SERPL-MCNC: 108 MG/DL (ref 74–99)
HBA1C MFR BLD: 6.2 % (ref 4.2–5.6)
HDLC SERPL-MCNC: 43 MG/DL (ref 40–60)
HDLC SERPL: 3.7 {RATIO} (ref 0–5)
LDLC SERPL CALC-MCNC: 99.2 MG/DL (ref 0–100)
LIPID PROFILE,FLP: NORMAL
POTASSIUM SERPL-SCNC: 4.1 MMOL/L (ref 3.5–5.5)
PROT SERPL-MCNC: 7.2 G/DL (ref 6.4–8.2)
SODIUM SERPL-SCNC: 140 MMOL/L (ref 136–145)
TRIGL SERPL-MCNC: 89 MG/DL (ref ?–150)
VLDLC SERPL CALC-MCNC: 17.8 MG/DL

## 2019-01-29 PROCEDURE — 83036 HEMOGLOBIN GLYCOSYLATED A1C: CPT

## 2019-01-29 PROCEDURE — 80061 LIPID PANEL: CPT

## 2019-01-29 PROCEDURE — 80053 COMPREHEN METABOLIC PANEL: CPT

## 2019-01-29 RX ORDER — FLUTICASONE PROPIONATE 50 MCG
SPRAY, SUSPENSION (ML) NASAL
Qty: 1 BOTTLE | Refills: 4 | Status: SHIPPED | OUTPATIENT
Start: 2019-01-29 | End: 2021-01-14

## 2019-01-29 RX ORDER — AZELASTINE 1 MG/ML
2 SPRAY, METERED NASAL 2 TIMES DAILY
Qty: 1 BOTTLE | Refills: 2 | Status: SHIPPED | OUTPATIENT
Start: 2019-01-29 | End: 2020-09-23 | Stop reason: SDUPTHER

## 2019-01-29 NOTE — PROGRESS NOTES
HISTORY OF PRESENT ILLNESS Robby Harkins is a 61 y.o. male. Sinus Infection The history is provided by the patient. This is a new problem. The current episode started more than 1 week ago. There has been no fever. The pain is moderate. Associated symptoms include congestion, sinus pressure, cough and rhinorrhea. Treatments tried: Went to ER and given augmentin and flonase and mucinex. The treatment provided moderate relief. Review of Systems HENT: Positive for congestion, rhinorrhea and sinus pressure. Respiratory: Positive for cough. Physical Exam  
Constitutional: He appears well-developed and well-nourished. HENT:  
Right Ear: Tympanic membrane and ear canal normal.  
Left Ear: Tympanic membrane and ear canal normal.  
Nose: Mucosal edema present. Mouth/Throat: Uvula is midline, oropharynx is clear and moist and mucous membranes are normal.  
Cardiovascular: Normal rate, regular rhythm and normal heart sounds. Pulmonary/Chest: Effort normal and breath sounds normal.  
Vitals reviewed. ASSESSMENT and PLAN 
  ICD-10-CM ICD-9-CM 1. Acute non-recurrent sinusitis, unspecified location J01.90 461.9  patient improving will continue on Augmentin 2. Seasonal allergic rhinitis due to pollen J30.1 477.0  will add azelastine (ASTELIN) 137 mcg (0.1 %) nasal spray To Flonase fluticasone (FLONASE) 50 mcg/actuation nasal spray Reviewed plan of care. Patient has provided input and agrees with goals.

## 2019-01-29 NOTE — PROGRESS NOTES
Patient is in the office today for sinus pressure. Do you have an Advance Directive no Do you want more information :information given 1. Have you been to the ER, urgent care clinic since your last visit? Hospitalized since your last visit? Yes, HV for sinus infection 2. Have you seen or consulted any other health care providers outside of the [address] [place] [address] since your last visit? Include any pap smears or colon screening.  No

## 2019-01-29 NOTE — PATIENT INSTRUCTIONS
High Blood Pressure: Care Instructions Overview It's normal for blood pressure to go up and down throughout the day. But if it stays up, you have high blood pressure. Another name for high blood pressure is hypertension. Despite what a lot of people think, high blood pressure usually doesn't cause headaches or make you feel dizzy or lightheaded. It usually has no symptoms. But it does increase your risk of stroke, heart attack, and other problems. You and your doctor will talk about your risks of these problems based on your blood pressure. Your doctor will give you a goal for your blood pressure. Your goal will be based on your health and your age. Lifestyle changes, such as eating healthy and being active, are always important to help lower blood pressure. You might also take medicine to reach your blood pressure goal. 
Follow-up care is a key part of your treatment and safety. Be sure to make and go to all appointments, and call your doctor if you are having problems. It's also a good idea to know your test results and keep a list of the medicines you take. How can you care for yourself at home? Medical treatment · If you stop taking your medicine, your blood pressure will go back up. You may take one or more types of medicine to lower your blood pressure. Be safe with medicines. Take your medicine exactly as prescribed. Call your doctor if you think you are having a problem with your medicine. · Talk to your doctor before you start taking aspirin every day. Aspirin can help certain people lower their risk of a heart attack or stroke. But taking aspirin isn't right for everyone, because it can cause serious bleeding. · See your doctor regularly. You may need to see the doctor more often at first or until your blood pressure comes down. · If you are taking blood pressure medicine, talk to your doctor before you take decongestants or anti-inflammatory medicine, such as ibuprofen. Some of these medicines can raise blood pressure. · Learn how to check your blood pressure at home. Lifestyle changes · Stay at a healthy weight. This is especially important if you put on weight around the waist. Losing even 10 pounds can help you lower your blood pressure. · If your doctor recommends it, get more exercise. Walking is a good choice. Bit by bit, increase the amount you walk every day. Try for at least 30 minutes on most days of the week. You also may want to swim, bike, or do other activities. · Avoid or limit alcohol. Talk to your doctor about whether you can drink any alcohol. · Try to limit how much sodium you eat to less than 2,300 milligrams (mg) a day. Your doctor may ask you to try to eat less than 1,500 mg a day. · Eat plenty of fruits (such as bananas and oranges), vegetables, legumes, whole grains, and low-fat dairy products. · Lower the amount of saturated fat in your diet. Saturated fat is found in animal products such as milk, cheese, and meat. Limiting these foods may help you lose weight and also lower your risk for heart disease. · Do not smoke. Smoking increases your risk for heart attack and stroke. If you need help quitting, talk to your doctor about stop-smoking programs and medicines. These can increase your chances of quitting for good. When should you call for help? Call 911 anytime you think you may need emergency care. This may mean having symptoms that suggest that your blood pressure is causing a serious heart or blood vessel problem. Your blood pressure may be over 180/120. 
 For example, call 911 if: 
  · You have symptoms of a heart attack. These may include: 
? Chest pain or pressure, or a strange feeling in the chest. 
? Sweating. ? Shortness of breath. ? Nausea or vomiting. ? Pain, pressure, or a strange feeling in the back, neck, jaw, or upper belly or in one or both shoulders or arms. ? Lightheadedness or sudden weakness. ? A fast or irregular heartbeat.  
  · You have symptoms of a stroke. These may include: 
? Sudden numbness, tingling, weakness, or loss of movement in your face, arm, or leg, especially on only one side of your body. ? Sudden vision changes. ? Sudden trouble speaking. ? Sudden confusion or trouble understanding simple statements. ? Sudden problems with walking or balance. ? A sudden, severe headache that is different from past headaches.  
  · You have severe back or belly pain.  
 Do not wait until your blood pressure comes down on its own. Get help right away. 
 Call your doctor now or seek immediate care if: 
  · Your blood pressure is much higher than normal (such as 180/120 or higher), but you don't have symptoms.  
  · You think high blood pressure is causing symptoms, such as: 
? Severe headache. 
? Blurry vision.  
 Watch closely for changes in your health, and be sure to contact your doctor if: 
  · Your blood pressure measures higher than your doctor recommends at least 2 times. That means the top number is higher or the bottom number is higher, or both.  
  · You think you may be having side effects from your blood pressure medicine. Where can you learn more? Go to http://luzmaria-bradly.info/. Enter G550 in the search box to learn more about \"High Blood Pressure: Care Instructions. \" Current as of: July 22, 2018 Content Version: 11.9 © 2019-1513 [employer]. Care instructions adapted under license by [employer] (which disclaims liability or warranty for this information). If you have questions about a medical condition or this instruction, always ask your healthcare professional. [place] [address] any warranty or liability for your use of this information. Advance Directives: Care Instructions Your Care Instructions An advance directive is a legal way to state your wishes at the end of your life. It tells your family and your doctor what to do if you can no longer say what you want. There are two main types of advance directives. You can change them any time that your wishes change. · A living will tells your family and your doctor your wishes about life support and other treatment. · A durable power of  for health care lets you name a person to make treatment decisions for you when you can't speak for yourself. This person is called a health care agent. If you do not have an advance directive, decisions about your medical care may be made by a doctor or a  who doesn't know you. It may help to think of an advance directive as a gift to the people who care for you. If you have one, they won't have to make tough decisions by themselves. Follow-up care is a key part of your treatment and safety. Be sure to make and go to all appointments, and call your doctor if you are having problems. It's also a good idea to know your test results and keep a list of the medicines you take. How can you care for yourself at home? · Discuss your wishes with your loved ones and your doctor. This way, there are no surprises. · Many states have a unique form. Or you might use a universal form that has been approved by many states. This kind of form can sometimes be completed and stored online. Your electronic copy will then be available wherever you have a connection to the Internet. In most cases, doctors will respect your wishes even if you have a form from a different state. · You don't need a  to do an advance directive. But you may want to get legal advice. · Think about these questions when you prepare an advance directive: 
? Who do you want to make decisions about your medical care if you are not able to? Many people choose a family member or close friend. ? Do you know enough about life support methods that might be used? If not, talk to your doctor so you understand. ? What are you most afraid of that might happen? You might be afraid of having pain, losing your independence, or being kept alive by machines. ? Where would you prefer to die? Choices include your home, a hospital, or a nursing home. ? Would you like to have information about hospice care to support you and your family? ? Do you want to donate organs when you die? ? Do you want certain Worship practices performed before you die? If so, put your wishes in the advance directive. · Read your advance directive every year, and make changes as needed. When should you call for help? Be sure to contact your doctor if you have any questions. Where can you learn more? Go to http://luzmaria-bradly.info/. Enter R264 in the search box to learn more about \"Advance Directives: Care Instructions. \" Current as of: April 18, 2018 Content Version: 11.9 © 6481-4719 [employer]. Care instructions adapted under license by [employer] (which disclaims liability or warranty for this information). If you have questions about a medical condition or this instruction, always ask your healthcare professional. [address] any warranty or liability for your use of this information.

## 2019-02-05 ENCOUNTER — OFFICE VISIT (OUTPATIENT)
Dept: FAMILY MEDICINE CLINIC | Age: 64
End: 2019-02-05

## 2019-02-05 VITALS
RESPIRATION RATE: 18 BRPM | OXYGEN SATURATION: 96 % | DIASTOLIC BLOOD PRESSURE: 79 MMHG | HEIGHT: 72 IN | BODY MASS INDEX: 29.53 KG/M2 | HEART RATE: 80 BPM | WEIGHT: 218 LBS | TEMPERATURE: 98.7 F | SYSTOLIC BLOOD PRESSURE: 166 MMHG

## 2019-02-05 DIAGNOSIS — R73.03 PREDIABETES: ICD-10-CM

## 2019-02-05 DIAGNOSIS — I10 ESSENTIAL HYPERTENSION: Primary | ICD-10-CM

## 2019-02-05 DIAGNOSIS — N40.1 BPH WITH OBSTRUCTION/LOWER URINARY TRACT SYMPTOMS: ICD-10-CM

## 2019-02-05 DIAGNOSIS — I48.20 CHRONIC ATRIAL FIBRILLATION (HCC): ICD-10-CM

## 2019-02-05 DIAGNOSIS — N13.8 BPH WITH OBSTRUCTION/LOWER URINARY TRACT SYMPTOMS: ICD-10-CM

## 2019-02-05 DIAGNOSIS — J30.1 SEASONAL ALLERGIC RHINITIS DUE TO POLLEN: ICD-10-CM

## 2019-02-05 RX ORDER — ALFUZOSIN HYDROCHLORIDE 10 MG/1
10 TABLET, EXTENDED RELEASE ORAL DAILY
Qty: 90 TAB | Refills: 3 | Status: SHIPPED | OUTPATIENT
Start: 2019-02-05 | End: 2019-11-06 | Stop reason: SDUPTHER

## 2019-02-05 NOTE — PROGRESS NOTES
Patient is in the office today for 6 month follow up. 1. Have you been to the ER, urgent care clinic since your last visit? Hospitalized since your last visit? No    2. Have you seen or consulted any other health care providers outside of the [address] [place] [address] since your last visit? Include any pap smears or colon screening.  No

## 2019-02-05 NOTE — PATIENT INSTRUCTIONS
High Blood Pressure: Care Instructions  Overview    It's normal for blood pressure to go up and down throughout the day. But if it stays up, you have high blood pressure. Another name for high blood pressure is hypertension. Despite what a lot of people think, high blood pressure usually doesn't cause headaches or make you feel dizzy or lightheaded. It usually has no symptoms. But it does increase your risk of stroke, heart attack, and other problems. You and your doctor will talk about your risks of these problems based on your blood pressure. Your doctor will give you a goal for your blood pressure. Your goal will be based on your health and your age. Lifestyle changes, such as eating healthy and being active, are always important to help lower blood pressure. You might also take medicine to reach your blood pressure goal.  Follow-up care is a key part of your treatment and safety. Be sure to make and go to all appointments, and call your doctor if you are having problems. It's also a good idea to know your test results and keep a list of the medicines you take. How can you care for yourself at home? Medical treatment  · If you stop taking your medicine, your blood pressure will go back up. You may take one or more types of medicine to lower your blood pressure. Be safe with medicines. Take your medicine exactly as prescribed. Call your doctor if you think you are having a problem with your medicine. · Talk to your doctor before you start taking aspirin every day. Aspirin can help certain people lower their risk of a heart attack or stroke. But taking aspirin isn't right for everyone, because it can cause serious bleeding. · See your doctor regularly. You may need to see the doctor more often at first or until your blood pressure comes down. · If you are taking blood pressure medicine, talk to your doctor before you take decongestants or anti-inflammatory medicine, such as ibuprofen.  Some of these medicines can raise blood pressure. · Learn how to check your blood pressure at home. Lifestyle changes  · Stay at a healthy weight. This is especially important if you put on weight around the waist. Losing even 10 pounds can help you lower your blood pressure. · If your doctor recommends it, get more exercise. Walking is a good choice. Bit by bit, increase the amount you walk every day. Try for at least 30 minutes on most days of the week. You also may want to swim, bike, or do other activities. · Avoid or limit alcohol. Talk to your doctor about whether you can drink any alcohol. · Try to limit how much sodium you eat to less than 2,300 milligrams (mg) a day. Your doctor may ask you to try to eat less than 1,500 mg a day. · Eat plenty of fruits (such as bananas and oranges), vegetables, legumes, whole grains, and low-fat dairy products. · Lower the amount of saturated fat in your diet. Saturated fat is found in animal products such as milk, cheese, and meat. Limiting these foods may help you lose weight and also lower your risk for heart disease. · Do not smoke. Smoking increases your risk for heart attack and stroke. If you need help quitting, talk to your doctor about stop-smoking programs and medicines. These can increase your chances of quitting for good. When should you call for help? Call 911 anytime you think you may need emergency care. This may mean having symptoms that suggest that your blood pressure is causing a serious heart or blood vessel problem. Your blood pressure may be over 180/120.   For example, call 911 if:    · You have symptoms of a heart attack. These may include:  ? Chest pain or pressure, or a strange feeling in the chest.  ? Sweating. ? Shortness of breath. ? Nausea or vomiting. ? Pain, pressure, or a strange feeling in the back, neck, jaw, or upper belly or in one or both shoulders or arms. ? Lightheadedness or sudden weakness.   ? A fast or irregular heartbeat.     · You have symptoms of a stroke. These may include:  ? Sudden numbness, tingling, weakness, or loss of movement in your face, arm, or leg, especially on only one side of your body. ? Sudden vision changes. ? Sudden trouble speaking. ? Sudden confusion or trouble understanding simple statements. ? Sudden problems with walking or balance. ? A sudden, severe headache that is different from past headaches.     · You have severe back or belly pain.    Do not wait until your blood pressure comes down on its own. Get help right away.   Call your doctor now or seek immediate care if:    · Your blood pressure is much higher than normal (such as 180/120 or higher), but you don't have symptoms.     · You think high blood pressure is causing symptoms, such as:  ? Severe headache.  ? Blurry vision.    Watch closely for changes in your health, and be sure to contact your doctor if:    · Your blood pressure measures higher than your doctor recommends at least 2 times. That means the top number is higher or the bottom number is higher, or both.     · You think you may be having side effects from your blood pressure medicine. Where can you learn more? Go to http://luzmaria-bradly.info/. Enter L149 in the search box to learn more about \"High Blood Pressure: Care Instructions. \"  Current as of: July 22, 2018  Content Version: 11.9  © 1513-1674 [employer]. Care instructions adapted under license by [employer] (which disclaims liability or warranty for this information). If you have questions about a medical condition or this instruction, always ask your healthcare professional. [place] [address] any warranty or liability for your use of this information.

## 2019-02-05 NOTE — PROGRESS NOTES
Diamond Irizarry Sr is a 61 y.o.  male and presents with Hypertension; Diabetes; Allergic Rhinitis; and Benign Prostatic Hypertrophy      SUBJECTIVE:  Pt's  BP is borderline controlled on on Cardizem 240 mg. He denies any fatigue. He is followed by Cardiology for his Afib, Pt's BPH and LUTS are fairly well controlled on Uroxatral. Pt is followed by urology. Pt is followed by GI for his Chron's disease. Pt continues to try and cut back the sweats and carbs in his diet to improve his prediabetes and lose 20 lbs. .  Allergies are well controlled on Claritin-D and Flonase and Singulair. Respiratory ROS: negative for - shortness of breath  Cardiovascular ROS: negative for - chest pain    Current Outpatient Medications   Medication Sig    alfuzosin SR (UROXATRAL) 10 mg SR tablet Take 1 Tab by mouth daily.  azelastine (ASTELIN) 137 mcg (0.1 %) nasal spray 2 Sprays by Both Nostrils route two (2) times a day. Use in each nostril as directed    fluticasone (FLONASE) 50 mcg/actuation nasal spray INSERT TWO SPRAYS IN EACH NOSTRIL EVERY DAY    montelukast (SINGULAIR) 10 mg tablet Take 1 Tab by mouth daily.  dilTIAZem CD (CARDIZEM CD) 240 mg ER capsule Take 1 Cap by mouth daily.  loratadine-pseudoephedrine (CLARITIN-D 24 HOUR)  mg per tablet Take 1 Tab by mouth daily.  flecainide (TAMBOCOR) 50 mg tablet 50 mg.    aspirin 81 mg chewable tablet 81 mg.    mesalamine EC (ASACOL) 400 mg EC tablet Take 400 mg by mouth three (3) times daily. No current facility-administered medications for this visit.           OBJECTIVE:  alert, well appearing, and in no distress  Visit Vitals  /79 (BP 1 Location: Left arm, BP Patient Position: Sitting)   Pulse 80   Temp 98.7 °F (37.1 °C) (Oral)   Resp 18   Ht 5' 11.5\" (1.816 m)   Wt 218 lb (98.9 kg)   SpO2 96%   BMI 29.98 kg/m²      well developed and well nourished  Chest - clear to auscultation, no wheezes, rales or rhonchi, symmetric air entry  Heart - normal rate, regular rhythm, normal S1, S2, no murmurs, rubs, clicks or gallops  Extremities - peripheral pulses normal, no pedal edema, no clubbing or cyanosis    Labs:   Lab Results   Component Value Date/Time    Cholesterol, total 160 01/29/2019 09:08 AM    HDL Cholesterol 43 01/29/2019 09:08 AM    LDL, calculated 99.2 01/29/2019 09:08 AM    Triglyceride 89 01/29/2019 09:08 AM    CHOL/HDL Ratio 3.7 01/29/2019 09:08 AM     Lab Results   Component Value Date/Time    ALT (SGPT) 38 01/29/2019 09:08 AM    AST (SGOT) 21 01/29/2019 09:08 AM    Alk. phosphatase 108 01/29/2019 09:08 AM    Bilirubin, total 0.3 01/29/2019 09:08 AM     Lab Results   Component Value Date/Time    GFR est AA >60 01/29/2019 09:08 AM    GFR est non-AA >60 01/29/2019 09:08 AM    Creatinine 0.97 01/29/2019 09:08 AM    BUN 11 01/29/2019 09:08 AM    Sodium 140 01/29/2019 09:08 AM    Potassium 4.1 01/29/2019 09:08 AM    Chloride 106 01/29/2019 09:08 AM    CO2 29 01/29/2019 09:08 AM      Lab Results   Component Value Date/Time    Prostate Specific Ag 3.6 04/09/2018 08:58 AM    Prostate Specific Ag 2.5 02/16/2017 09:14 AM    Prostate Specific Ag 2.5 02/18/2016 03:00 PM    Prostate Specific Ag 2.1 02/04/2015 08:53 AM    Prostate Specific Ag 2.0 01/27/2014 08:56 AM    Prostate Specific Ag 1.4 01/14/2013 09:46 AM     Lab Results   Component Value Date/Time    Glucose 108 (H) 01/29/2019 09:08 AM      Labs:   Lab Results   Component Value Date/Time    Hemoglobin A1c 6.2 (H) 01/29/2019 09:08 AM    Hemoglobin A1c 6.3 (H) 08/01/2018 09:28 AM    Hemoglobin A1c 6.2 (H) 08/16/2017 11:01 AM    Glucose 108 (H) 01/29/2019 09:08 AM    LDL, calculated 99.2 01/29/2019 09:08 AM    Creatinine 0.97 01/29/2019 09:08 AM          Assessment/Plan      ICD-10-CM ICD-9-CM    1. Essential hypertension I10 401.9  borderline controlled on Cardizem CD.   Patient to monitor blood pressure at home and if not improved consider adjusting blood pressure medicines LIPID PANEL METABOLIC PANEL, COMPREHENSIVE   2. Prediabetes R73.03 790.29  patient will continue to try improve with diet and weight loss. HEMOGLOBIN A1C W/O EAG   3. BPH with obstruction/lower urinary tract symptoms N40.1 600.01  stable on alfuzosin SR (UROXATRAL) 10 mg SR tablet. Patient is followed by urology    N13.8 599.69    4. Seasonal allergic rhinitis due to pollen J30.1 477.0  controlled on Singulair and Flonase. As well as Astelin nose spray   5. Chronic atrial fibrillation (HCC) I48.2 427.31  stable on Cardizem and flecainide patient is followed by cardiology. Follow-up Disposition:  Return in about 3 months (around 5/5/2019) for labs 1 week before. Reviewed plan of care. Patient has provided input and agrees with goals.

## 2019-03-19 DIAGNOSIS — N40.1 BPH WITH OBSTRUCTION/LOWER URINARY TRACT SYMPTOMS: ICD-10-CM

## 2019-03-19 DIAGNOSIS — N13.8 BPH WITH OBSTRUCTION/LOWER URINARY TRACT SYMPTOMS: ICD-10-CM

## 2019-03-20 RX ORDER — ALFUZOSIN HYDROCHLORIDE 10 MG/1
TABLET, EXTENDED RELEASE ORAL
Qty: 90 TAB | Refills: 3 | Status: SHIPPED | OUTPATIENT
Start: 2019-03-20 | End: 2019-07-11

## 2019-04-09 ENCOUNTER — HOSPITAL ENCOUNTER (OUTPATIENT)
Dept: LAB | Age: 64
Discharge: HOME | End: 2019-04-09
Payer: COMMERCIAL

## 2019-04-09 ENCOUNTER — OFFICE VISIT (OUTPATIENT)
Dept: UROLOGY | Age: 64
End: 2019-04-09

## 2019-04-09 VITALS
HEIGHT: 71 IN | BODY MASS INDEX: 30.94 KG/M2 | SYSTOLIC BLOOD PRESSURE: 110 MMHG | OXYGEN SATURATION: 96 % | DIASTOLIC BLOOD PRESSURE: 60 MMHG | HEART RATE: 72 BPM | WEIGHT: 221 LBS

## 2019-04-09 DIAGNOSIS — N40.1 BPH ASSOCIATED WITH NOCTURIA: Primary | ICD-10-CM

## 2019-04-09 DIAGNOSIS — R35.1 BPH ASSOCIATED WITH NOCTURIA: ICD-10-CM

## 2019-04-09 DIAGNOSIS — N40.1 BPH ASSOCIATED WITH NOCTURIA: ICD-10-CM

## 2019-04-09 DIAGNOSIS — R35.1 BPH ASSOCIATED WITH NOCTURIA: Primary | ICD-10-CM

## 2019-04-09 LAB
BILIRUB UR QL STRIP: NEGATIVE
GLUCOSE UR-MCNC: NEGATIVE MG/DL
KETONES P FAST UR STRIP-MCNC: NEGATIVE MG/DL
PH UR STRIP: 7 [PH] (ref 4.6–8)
PROT UR QL STRIP: NEGATIVE
PSA SERPL-MCNC: 5 NG/ML (ref 0–4)
SP GR UR STRIP: 1.01 (ref 1–1.03)
UA UROBILINOGEN AMB POC: NORMAL (ref 0.2–1)
URINALYSIS CLARITY POC: CLEAR
URINALYSIS COLOR POC: YELLOW
URINE BLOOD POC: NEGATIVE
URINE LEUKOCYTES POC: NEGATIVE
URINE NITRITES POC: NEGATIVE

## 2019-04-09 PROCEDURE — 84153 ASSAY OF PSA TOTAL: CPT

## 2019-04-09 RX ORDER — ALFUZOSIN HYDROCHLORIDE 10 MG/1
10 TABLET, EXTENDED RELEASE ORAL DAILY
Qty: 90 TAB | Refills: 3 | Status: SHIPPED | OUTPATIENT
Start: 2019-04-09 | End: 2019-07-11

## 2019-04-09 NOTE — PATIENT INSTRUCTIONS
Benign Prostatic Hyperplasia: Care Instructions  Your Care Instructions    Benign prostatic hyperplasia, or BPH, is an enlarged prostate gland. The prostate is a small gland that makes some of the fluid in semen. Prostate enlargement happens to almost all men as they age. It is usually not serious. BPH does not cause prostate cancer. As the prostate gets bigger, it may partly block the flow of urine. You may have a hard time getting a urine stream started or completely stopped. BPH can cause dribbling. You may have a weak urine stream, or you may have to urinate more often than you used to, especially at night. Most men find these problems easy to manage. You do not need treatment unless your symptoms bother you a lot or you have other problems, such as bladder infections or stones. In these cases, medicines may help. Surgery is not needed unless the urine flow is blocked or the symptoms do not get better with medicine. Follow-up care is a key part of your treatment and safety. Be sure to make and go to all appointments, and call your doctor if you are having problems. It's also a good idea to know your test results and keep a list of the medicines you take. How can you care for yourself at home? · Take plenty of time to urinate. Try to relax. · Try \"double voiding. \" Urinate as much you can, relax for a few moments, and then try to urinate again. · Sit on the toilet to urinate. · Read or think of other things while you are waiting. · Turn on a faucet, or try to picture running water. Some men find that this helps get their urine flowing. · If dribbling is a problem, wash your penis daily to avoid skin irritation and infection. · Avoid caffeine and alcohol. These drinks will increase how often you need to urinate. Spread your fluid intake throughout the day. If the urge to urinate often wakes you at night, limit your fluid intake in the evening. Urinate right before you go to bed.   · Many over-the-counter cold and allergy medicines can make the symptoms of BPH worse. Avoid antihistamines, decongestants, and allergy pills, if you can. Read the warnings on the package. · If you take any prescription medicines, especially tranquilizers or antidepressants, ask your doctor or pharmacist whether they can cause urination problems. There may be other medicines you can use that do not cause urinary problems. · Be safe with medicines. Take your medicines exactly as prescribed. Call your doctor if you think you are having a problem with your medicine. When should you call for help? Call your doctor now or seek immediate medical care if:    · You cannot urinate at all.     · You have symptoms of a urinary infection. For example:  ? You have blood or pus in your urine. ? You have pain in your back just below your rib cage. This is called flank pain. ? You have a fever, chills, or body aches. ? It hurts to urinate. ? You have groin or belly pain.    Watch closely for changes in your health, and be sure to contact your doctor if:    · It hurts when you ejaculate.     · Your urinary problems get a lot worse or bother you a lot. Where can you learn more? Go to http://luzmaria-bradly.info/. Enter A913 in the search box to learn more about \"Benign Prostatic Hyperplasia: Care Instructions. \"  Current as of: September 26, 2018  Content Version: 11.9  © 0093-8624 [employer]. Care instructions adapted under license by [employer] (which disclaims liability or warranty for this information). If you have questions about a medical condition or this instruction, always ask your healthcare professional. [address] any warranty or liability for your use of this information.

## 2019-04-09 NOTE — PROGRESS NOTES
Phoebe Staff has a reminder for a \"due or due soon\" health maintenance. I have asked that he contact his primary care provider for follow-up on this health maintenance.

## 2019-04-09 NOTE — PROGRESS NOTES
Emily Williammichaela Sr 61 y.o. male     Mr. Aparna Simms seen today for annual follow-up symptomatic BPH on alpha-blocker therapy with Uroxatrol 10 mg daily reporting no problems with urination at this time-voids with a solid stream good control nocturia once per night    Patient has a history going back 30 years of recurrent left epididymitis hospitalized for 2 months at age 25 further epididymitis-10% PA disability secondary to chronic epididymitis or  Patient has experienced no symptoms improvement on alpha-blocker therapy with Uroxatral 10 mg daily  Retired in October 2017-thinking about going back to work no complaints regarding urination at this time-      PSA 2.5  in February 2016  PSA 2.5 in February 2017  PSA 3.6 in April 2018    PVR 46 cc in April 2019     Review of Systems:    CNS: Procedure syncope headaches or dizziness no visual changes  Respiratory: No wheezing or shortness of breath  Cardiovascular:No chest pain or palpitations/hypertension  Intestinal:Chronic constipation no dyspepsia no diarrhea  Urinary: Obstructive and irritative voiding symptoms-Chronic left epididymitis  Skeletal: Chronic low back pain  Endocrine:No diabetes or thyroid disease  Other:    Allergies: Allergies   Allergen Reactions    Iron Nausea Only      Medications:    Current Outpatient Medications   Medication Sig Dispense Refill    alfuzosin SR (UROXATRAL) 10 mg SR tablet Take 1 Tab by mouth daily. 90 Tab 3    alfuzosin SR (UROXATRAL) 10 mg SR tablet Take 1 Tab by mouth daily. 90 Tab 3    azelastine (ASTELIN) 137 mcg (0.1 %) nasal spray 2 Sprays by Both Nostrils route two (2) times a day. Use in each nostril as directed 1 Bottle 2    fluticasone (FLONASE) 50 mcg/actuation nasal spray INSERT TWO SPRAYS IN EACH NOSTRIL EVERY DAY 1 Bottle 4    montelukast (SINGULAIR) 10 mg tablet Take 1 Tab by mouth daily. 30 Tab 5    dilTIAZem CD (CARDIZEM CD) 240 mg ER capsule Take 1 Cap by mouth daily.       loratadine-pseudoephedrine (CLARITIN-D 24 HOUR)  mg per tablet Take 1 Tab by mouth daily. 10 Tab 0    flecainide (TAMBOCOR) 50 mg tablet 50 mg.      aspirin 81 mg chewable tablet 81 mg.      mesalamine EC (ASACOL) 400 mg EC tablet Take 400 mg by mouth three (3) times daily.  alfuzosin SR (UROXATRAL) 10 mg SR tablet TAKE 1 TABLET BY MOUTH ONCE DAILY -  INDICATIONS-BENIGN  PROSTATIC  HYPERPLASIA  WITH  LOWER  URINARY  TRACT  SYMPTOMS. 80 Tab 3       Past Medical History:   Diagnosis Date    [facility]fib [facility])     BPH (benign prostatic hypertrophy) with urinary obstruction 2010    [facility]'s disease ([facility] [address]     Crohn's disease of the colon 2011    Dyspnea     ED (erectile dysfunction) 2010    Hypertension       History reviewed. No pertinent surgical history.   Social History     Socioeconomic History    Marital status:      Spouse name: Not on file    Number of children: Not on file    Years of education: Not on file    Highest education level: Not on file   Occupational History    Not on file   Social Needs    Financial resource strain: Not on file    Food insecurity:     Worry: Not on file     Inability: Not on file    Transportation needs:     Medical: Not on file     Non-medical: Not on file   Tobacco Use    Smoking status: Former Smoker     Last attempt to quit: 1990     Years since quittin.9    Smokeless tobacco: Never Used   Substance and Sexual Activity    Alcohol use: No    Drug use: No    Sexual activity: Yes     Partners: Female   Lifestyle    Physical activity:     Days per week: Not on file     Minutes per session: Not on file    Stress: Not on file   Relationships    Social connections:     Talks on phone: Not on file     Gets together: Not on file     Attends Mandaeism service: Not on file     Active member of club or organization: Not on file     Attends meetings of clubs or organizations: Not on file     Relationship status: Not on file    Intimate partner violence: Fear of current or ex partner: Not on file     Emotionally abused: Not on file     Physically abused: Not on file     Forced sexual activity: Not on file   Other Topics Concern    Not on file   Social History Narrative    Not on file      History reviewed. No pertinent family history. Physical Examination: Well-nourished mature male in no apparent distress    Prostate by WILLIAM is large rounded smooth benign consistency nontender-no induration no nodularity  No rectal masses induration or tenderness    Urinalysis: Negative dipstick/nitrite negative/heme-negative    PVR 46 cc today    Impression: Symptomatic BPH responding favorably to alpha-blocker Rx        Plan: Uroxatrol 10 mg daily #90 refill x3     PSA today      RTC 1 year PSA WILLIAM PVR        More than 1/2 of this 15 minute visit was spent in counselling and coordination of care, as described above. Jhonatan Xiao MD  -electronically signed-    PLEASE NOTE:  This document has been produced using voice recognition software. Unrecognized errors in transcription may be present.

## 2019-04-10 NOTE — PROGRESS NOTES
PSA 5.0 on 9 April 2019 PSA 2.5  in February 2016 PSA 2.5 in February 2017 PSA 3.6 in April 2018 
  
 
Impression elevated PSA Prostate biopsy is indicated and recommended Jayda Sanchez MD

## 2019-04-15 ENCOUNTER — TELEPHONE (OUTPATIENT)
Dept: UROLOGY | Age: 64
End: 2019-04-15

## 2019-04-25 ENCOUNTER — OFFICE VISIT (OUTPATIENT)
Dept: UROLOGY | Age: 64
End: 2019-04-25

## 2019-04-25 VITALS
DIASTOLIC BLOOD PRESSURE: 80 MMHG | HEIGHT: 71 IN | SYSTOLIC BLOOD PRESSURE: 120 MMHG | HEART RATE: 58 BPM | OXYGEN SATURATION: 98 % | BODY MASS INDEX: 30.94 KG/M2 | WEIGHT: 221 LBS

## 2019-04-25 DIAGNOSIS — N40.1 BPH ASSOCIATED WITH NOCTURIA: ICD-10-CM

## 2019-04-25 DIAGNOSIS — R97.20 ELEVATED PSA: Primary | ICD-10-CM

## 2019-04-25 DIAGNOSIS — R35.1 BPH ASSOCIATED WITH NOCTURIA: ICD-10-CM

## 2019-04-25 NOTE — PROGRESS NOTES
Mr. Mavis Hart has a reminder for a \"due or due soon\" health maintenance. I have asked that he contact his primary care provider for follow-up on this health maintenance.

## 2019-04-26 NOTE — PROGRESS NOTES
Rojelio Kramer Sr 61 y.o. male     Mr. Bibiana Yan seen today for counseling regarding recent rising PSA found on routine BPH follow-up  symptomatic BPH on alpha-blocker therapy with Uroxatrol 10 mg daily reporting no problems with urination at this time-voids with a solid stream good control nocturia once per night     Patient has a history going back 30 years of recurrent left epididymitis hospitalized for 2 months at age 25 further epididymitis-10% PA disability secondary to chronic epididymitis or  Patient has experienced no symptoms improvement on alpha-blocker therapy with Uroxatral 10 mg daily  Retired in October 2017-thinking about going back to work no complaints regarding urination at this time-      PSA 2.5  in February 2016  PSA 2.5 in February 2017  PSA 3.6 in April 2018  PSA 5.01 09 April 2019     PVR 46 cc in April 2019     Review of Systems:    CNS: Procedure syncope headaches or dizziness no visual changes  Respiratory: No wheezing or shortness of breath  Cardiovascular:No chest pain or palpitations/hypertension  Intestinal:Chronic constipation no dyspepsia no diarrhea  Urinary: Obstructive and irritative voiding symptoms-Chronic left epididymitis  Skeletal: Chronic low back pain  Endocrine:No diabetes or thyroid disease  Other:     Allergies: Allergies   Allergen Reactions    Iron Nausea Only      Medications:    Current Outpatient Medications   Medication Sig Dispense Refill    alfuzosin SR (UROXATRAL) 10 mg SR tablet Take 1 Tab by mouth daily. 90 Tab 3    alfuzosin SR (UROXATRAL) 10 mg SR tablet TAKE 1 TABLET BY MOUTH ONCE DAILY -  INDICATIONS-BENIGN  PROSTATIC  HYPERPLASIA  WITH  LOWER  URINARY  TRACT  SYMPTOMS. 90 Tab 3    alfuzosin SR (UROXATRAL) 10 mg SR tablet Take 1 Tab by mouth daily. 90 Tab 3    azelastine (ASTELIN) 137 mcg (0.1 %) nasal spray 2 Sprays by Both Nostrils route two (2) times a day.  Use in each nostril as directed 1 Bottle 2    fluticasone (FLONASE) 50 mcg/actuation nasal spray INSERT TWO SPRAYS IN EACH NOSTRIL EVERY DAY 1 Bottle 4    montelukast (SINGULAIR) 10 mg tablet Take 1 Tab by mouth daily. 30 Tab 5    dilTIAZem CD (CARDIZEM CD) 240 mg ER capsule Take 1 Cap by mouth daily.  loratadine-pseudoephedrine (CLARITIN-D 24 HOUR)  mg per tablet Take 1 Tab by mouth daily. 10 Tab 0    flecainide (TAMBOCOR) 50 mg tablet 50 mg.      aspirin 81 mg chewable tablet 81 mg.      mesalamine EC (ASACOL) 400 mg EC tablet Take 400 mg by mouth three (3) times daily. Past Medical History:   Diagnosis Date    A-fib [facility])     BPH (benign prostatic hypertrophy) with urinary obstruction 2010    Cro[facility]'s disease ([facility] [address]     Crohn's disease of the colon 2011    Dyspnea     ED (erectile dysfunction) 2010    Hypertension       History reviewed. No pertinent surgical history.   Social History     Socioeconomic History    Marital status:      Spouse name: Not on file    Number of children: Not on file    Years of education: Not on file    Highest education level: Not on file   Occupational History    Not on file   Social Needs    Financial resource strain: Not on file    Food insecurity:     Worry: Not on file     Inability: Not on file    Transportation needs:     Medical: Not on file     Non-medical: Not on file   Tobacco Use    Smoking status: Former Smoker     Last attempt to quit: 1990     Years since quittin.9    Smokeless tobacco: Never Used   Substance and Sexual Activity    Alcohol use: No    Drug use: No    Sexual activity: Yes     Partners: Female   Lifestyle    Physical activity:     Days per week: Not on file     Minutes per session: Not on file    Stress: Not on file   Relationships    Social connections:     Talks on phone: Not on file     Gets together: Not on file     Attends Hinduism service: Not on file     Active member of club or organization: Not on file     Attends meetings of clubs or organizations: Not on file     Relationship status: Not on file    Intimate partner violence:     Fear of current or ex partner: Not on file     Emotionally abused: Not on file     Physically abused: Not on file     Forced sexual activity: Not on file   Other Topics Concern    Not on file   Social History Narrative    Not on file      History reviewed. No pertinent family history. Physical Examination: Well-nourished mature male in no apparent distress    Benign consistency prostate on WILLIAM        Impression: Symptomatic BPH responding beautifully to alpha-blocker Rx-Uroxatrol                        PSA elevation      Plan: Prostate biopsy is indicated and recommended    Counseled today regarding technique a transperineal prostate biopsy including risk of bleeding, infection, urinary retention  Rx Cipro 500 mg twice daily x3 days prep prophylaxis/attendant required    Patient expresses strong reservations regarding indications for prostate biopsy/also expresses a strong conviction prostate carcinoma may be a secondary effect of asbestos exposure-  Both issues discussed-asbestosis concern not supported by current concepts regarding etiology of prostate carcinoma    Patient referred to [facility] for evaluation and second opinion regarding indications for prostate biopsy    RTC prn -prostate biopsy is indicated and recommended      More than 1/2 of this 15 minute visit was spent in counselling and coordination of care, as described above. Magdalena Mccurdy MD  -electronically signed-    PLEASE NOTE:  This document has been produced using voice recognition software. Unrecognized errors in transcription may be present.

## 2019-05-07 ENCOUNTER — HOSPITAL ENCOUNTER (OUTPATIENT)
Dept: LAB | Age: 64
Discharge: HOME | End: 2019-05-07
Payer: COMMERCIAL

## 2019-05-07 ENCOUNTER — OFFICE VISIT (OUTPATIENT)
Dept: FAMILY MEDICINE CLINIC | Age: 64
End: 2019-05-07

## 2019-05-07 VITALS
DIASTOLIC BLOOD PRESSURE: 79 MMHG | TEMPERATURE: 97.7 F | BODY MASS INDEX: 31.5 KG/M2 | WEIGHT: 225 LBS | SYSTOLIC BLOOD PRESSURE: 160 MMHG | RESPIRATION RATE: 20 BRPM | HEIGHT: 71 IN | HEART RATE: 65 BPM | OXYGEN SATURATION: 95 %

## 2019-05-07 DIAGNOSIS — N40.1 BPH WITH OBSTRUCTION/LOWER URINARY TRACT SYMPTOMS: ICD-10-CM

## 2019-05-07 DIAGNOSIS — I10 ESSENTIAL HYPERTENSION: ICD-10-CM

## 2019-05-07 DIAGNOSIS — I48.20 CHRONIC ATRIAL FIBRILLATION (HCC): ICD-10-CM

## 2019-05-07 DIAGNOSIS — I10 ESSENTIAL HYPERTENSION: Primary | ICD-10-CM

## 2019-05-07 DIAGNOSIS — E55.9 VITAMIN D DEFICIENCY: ICD-10-CM

## 2019-05-07 DIAGNOSIS — R53.82 CHRONIC FATIGUE: ICD-10-CM

## 2019-05-07 DIAGNOSIS — R73.03 PREDIABETES: ICD-10-CM

## 2019-05-07 DIAGNOSIS — J30.1 SEASONAL ALLERGIC RHINITIS DUE TO POLLEN: ICD-10-CM

## 2019-05-07 DIAGNOSIS — R97.20 ELEVATED PSA: ICD-10-CM

## 2019-05-07 DIAGNOSIS — N13.8 BPH WITH OBSTRUCTION/LOWER URINARY TRACT SYMPTOMS: ICD-10-CM

## 2019-05-07 LAB
25(OH)D3 SERPL-MCNC: 14.8 NG/ML (ref 30–100)
ALBUMIN SERPL-MCNC: 3.8 G/DL (ref 3.4–5)
ALBUMIN/GLOB SERPL: 1.1 {RATIO} (ref 0.8–1.7)
ALP SERPL-CCNC: 96 U/L (ref 45–117)
ALT SERPL-CCNC: 38 U/L (ref 16–61)
ANION GAP SERPL CALC-SCNC: 6 MMOL/L (ref 3–18)
AST SERPL-CCNC: 24 U/L (ref 15–37)
BASOPHILS # BLD: 0 K/UL (ref 0–0.1)
BASOPHILS NFR BLD: 1 % (ref 0–2)
BILIRUB SERPL-MCNC: 0.3 MG/DL (ref 0.2–1)
BUN SERPL-MCNC: 9 MG/DL (ref 7–18)
BUN/CREAT SERPL: 9 (ref 12–20)
CALCIUM SERPL-MCNC: 9.4 MG/DL (ref 8.5–10.1)
CHLORIDE SERPL-SCNC: 106 MMOL/L (ref 100–108)
CHOLEST SERPL-MCNC: 169 MG/DL
CO2 SERPL-SCNC: 26 MMOL/L (ref 21–32)
CREAT SERPL-MCNC: 0.96 MG/DL (ref 0.6–1.3)
DIFFERENTIAL METHOD BLD: ABNORMAL
EOSINOPHIL # BLD: 0.6 K/UL (ref 0–0.4)
EOSINOPHIL NFR BLD: 12 % (ref 0–5)
ERYTHROCYTE [DISTWIDTH] IN BLOOD BY AUTOMATED COUNT: 14.2 % (ref 11.6–14.5)
GLOBULIN SER CALC-MCNC: 3.5 G/DL (ref 2–4)
GLUCOSE SERPL-MCNC: 91 MG/DL (ref 74–99)
HBA1C MFR BLD HPLC: 6.2 %
HCT VFR BLD AUTO: 42.2 % (ref 36–48)
HDLC SERPL-MCNC: 43 MG/DL (ref 40–60)
HDLC SERPL: 3.9 {RATIO} (ref 0–5)
HGB BLD-MCNC: 13.4 G/DL (ref 13–16)
LDLC SERPL CALC-MCNC: 95.6 MG/DL (ref 0–100)
LIPID PROFILE,FLP: ABNORMAL
LYMPHOCYTES # BLD: 1.6 K/UL (ref 0.9–3.6)
LYMPHOCYTES NFR BLD: 30 % (ref 21–52)
MCH RBC QN AUTO: 27 PG (ref 24–34)
MCHC RBC AUTO-ENTMCNC: 31.8 G/DL (ref 31–37)
MCV RBC AUTO: 85.1 FL (ref 74–97)
MONOCYTES # BLD: 0.6 K/UL (ref 0.05–1.2)
MONOCYTES NFR BLD: 13 % (ref 3–10)
NEUTS SEG # BLD: 2.3 K/UL (ref 1.8–8)
NEUTS SEG NFR BLD: 44 % (ref 40–73)
PLATELET # BLD AUTO: 257 K/UL (ref 135–420)
PMV BLD AUTO: 9.8 FL (ref 9.2–11.8)
POTASSIUM SERPL-SCNC: 4.1 MMOL/L (ref 3.5–5.5)
PROT SERPL-MCNC: 7.3 G/DL (ref 6.4–8.2)
RBC # BLD AUTO: 4.96 M/UL (ref 4.7–5.5)
SODIUM SERPL-SCNC: 138 MMOL/L (ref 136–145)
TRIGL SERPL-MCNC: 152 MG/DL (ref ?–150)
VLDLC SERPL CALC-MCNC: 30.4 MG/DL
WBC # BLD AUTO: 5.1 K/UL (ref 4.6–13.2)

## 2019-05-07 PROCEDURE — 82306 VITAMIN D 25 HYDROXY: CPT

## 2019-05-07 PROCEDURE — 80061 LIPID PANEL: CPT

## 2019-05-07 PROCEDURE — 36415 COLL VENOUS BLD VENIPUNCTURE: CPT

## 2019-05-07 PROCEDURE — 85025 COMPLETE CBC W/AUTO DIFF WBC: CPT

## 2019-05-07 PROCEDURE — 80053 COMPREHEN METABOLIC PANEL: CPT

## 2019-05-07 NOTE — PROGRESS NOTES
Patient is in the office today for 3 month follow up. 1. Have you been to the ER, urgent care clinic since your last visit? Hospitalized since your last visit? No    2. Have you seen or consulted any other health care providers outside of the [address] [place] [address] since your last visit? Include any pap smears or colon screening.  No

## 2019-05-07 NOTE — PROGRESS NOTES
Tal Gomez Sr is a 61 y.o.  male and presents with Diabetes (f/u); Hypertension (monitor weekly); and Vitamin D Deficiency      SUBJECTIVE:  Pt's  BP is borderline controlled on on Cardizem 240 mg. He denies any fatigue. He is followed by Cardiology for his Afib, He will continue to monitor his BP at home and try to lose 20 lbs. Pt's BPH and LUTS are fairly well controlled on Uroxatral. Pt is followed by urology. Pt is followed by GI for his Crohn's disease. Pt continues to try and cut back the sweats and carbs in his diet to improve his prediabetes and lose 20 lbs. .  Allergies are well controlled on Claritin-D and Flonase and Singulair. Pt is at risk of Vit D deficiency. Will check levels especially with pt having le cramps at night. Patient's PSA has been elevated and he needs prostate biopsy. He wanted to get a second opinion from another urologist to make sure that a prostate biopsy is needed at this time. Respiratory ROS: negative for - shortness of breath  Cardiovascular ROS: negative for - chest pain    Current Outpatient Medications   Medication Sig    alfuzosin SR (UROXATRAL) 10 mg SR tablet Take 1 Tab by mouth daily.  alfuzosin SR (UROXATRAL) 10 mg SR tablet TAKE 1 TABLET BY MOUTH ONCE DAILY -  INDICATIONS-BENIGN  PROSTATIC  HYPERPLASIA  WITH  LOWER  URINARY  TRACT  SYMPTOMS.  alfuzosin SR (UROXATRAL) 10 mg SR tablet Take 1 Tab by mouth daily.  azelastine (ASTELIN) 137 mcg (0.1 %) nasal spray 2 Sprays by Both Nostrils route two (2) times a day. Use in each nostril as directed    fluticasone (FLONASE) 50 mcg/actuation nasal spray INSERT TWO SPRAYS IN EACH NOSTRIL EVERY DAY    montelukast (SINGULAIR) 10 mg tablet Take 1 Tab by mouth daily.  dilTIAZem CD (CARDIZEM CD) 240 mg ER capsule Take 1 Cap by mouth daily.  loratadine-pseudoephedrine (CLARITIN-D 24 HOUR)  mg per tablet Take 1 Tab by mouth daily.     flecainide (TAMBOCOR) 50 mg tablet 50 mg.    aspirin 81 mg chewable tablet 81 mg.    mesalamine EC (ASACOL) 400 mg EC tablet Take 400 mg by mouth three (3) times daily. No current facility-administered medications for this visit. OBJECTIVE:  alert, well appearing, and in no distress  Visit Vitals  /79 (BP 1 Location: Left arm, BP Patient Position: Sitting)   Pulse 65   Temp 97.7 °F (36.5 °C) (Oral)   Resp 20   Ht 5' 11\" (1.803 m)   Wt 225 lb (102.1 kg)   SpO2 95%   BMI 31.38 kg/m²      well developed and well nourished  Chest - clear to auscultation, no wheezes, rales or rhonchi, symmetric air entry  Heart - normal rate, regular rhythm, normal S1, S2, no murmurs, rubs, clicks or gallops  Extremities - peripheral pulses normal, no pedal edema, no clubbing or cyanosis    Labs:   Lab Results   Component Value Date/Time    Cholesterol, total 169 05/07/2019 09:34 AM    HDL Cholesterol 43 05/07/2019 09:34 AM    LDL, calculated 95.6 05/07/2019 09:34 AM    Triglyceride 152 (H) 05/07/2019 09:34 AM    CHOL/HDL Ratio 3.9 05/07/2019 09:34 AM     Lab Results   Component Value Date/Time    ALT (SGPT) 38 05/07/2019 09:34 AM    AST (SGOT) 24 05/07/2019 09:34 AM    Alk.  phosphatase 96 05/07/2019 09:34 AM    Bilirubin, total 0.3 05/07/2019 09:34 AM     Lab Results   Component Value Date/Time    GFR est AA >60 05/07/2019 09:34 AM    GFR est non-AA >60 05/07/2019 09:34 AM    Creatinine 0.96 05/07/2019 09:34 AM    BUN 9 05/07/2019 09:34 AM    Sodium 138 05/07/2019 09:34 AM    Potassium 4.1 05/07/2019 09:34 AM    Chloride 106 05/07/2019 09:34 AM    CO2 26 05/07/2019 09:34 AM      Lab Results   Component Value Date/Time    Prostate Specific Ag 5.0 (H) 04/09/2019 09:41 AM    Prostate Specific Ag 3.6 04/09/2018 08:58 AM    Prostate Specific Ag 2.5 02/16/2017 09:14 AM    Prostate Specific Ag 2.1 02/04/2015 08:53 AM    Prostate Specific Ag 2.0 01/27/2014 08:56 AM    Prostate Specific Ag 1.4 01/14/2013 09:46 AM     Lab Results   Component Value Date/Time    Glucose 91 05/07/2019 09:34 AM      Labs:   Lab Results   Component Value Date/Time    Hemoglobin A1c 6.2 (H) 01/29/2019 09:08 AM    Hemoglobin A1c 6.3 (H) 08/01/2018 09:28 AM    Hemoglobin A1c 6.2 (H) 08/16/2017 11:01 AM    Glucose 91 05/07/2019 09:34 AM    LDL, calculated 95.6 05/07/2019 09:34 AM    Creatinine 0.96 05/07/2019 09:34 AM          Assessment/Plan      ICD-10-CM ICD-9-CM    1. Essential hypertension I10 401.9  uncontrolled on Cardizem. Patient will monitor on a regular basis and if not improving with weight loss will need to adjust medications. METABOLIC PANEL, COMPREHENSIVE      LIPID PANEL   2. Prediabetes R73.03 790.29  borderline controlled. Patient will try and improve further with weight loss AMB POC HEMOGLOBIN A1C   3. Chronic atrial fibrillation (HCC) I48.2 427.31  stable on Cardizem and flecainide and aspirin 81 mg daily. Patient followed by cardiology   4. Seasonal allergic rhinitis due to pollen J30.1 477.0  stable on current medications   5. BPH with obstruction/lower urinary tract symptoms N40.1 600.01  stable on Uroxatrol    N13.8 599.69    6. Elevated PSA R97.20 790.93 REFERRAL TO UROLOGY for second opinion on prostate biopsy. 7. Chronic fatigue R53.82 780.79 CBC WITH AUTOMATED DIFF   8. Vitamin D deficiency E55.9 268.9  patient at risk especially with myalgias. VITAMIN D, 25 HYDROXY       Follow-up and Dispositions    · Return in about 6 months (around 11/7/2019). Reviewed plan of care. Patient has provided input and agrees with goals.

## 2019-05-07 NOTE — PATIENT INSTRUCTIONS

## 2019-05-08 NOTE — PROGRESS NOTES
Patient aware  vit D level is low. He should take vit D 2000 units/day. Rest of blood work including blood count is normal.  Patient verbalizes understanding.

## 2019-05-08 NOTE — PROGRESS NOTES
Tell patient his/her vit D level is low. He should take vit D 2000 units/day.  Rest of blood work including blood count is normal

## 2019-11-26 ENCOUNTER — OFFICE VISIT (OUTPATIENT)
Dept: FAMILY MEDICINE CLINIC | Age: 64
End: 2019-11-26

## 2019-11-26 ENCOUNTER — HOSPITAL ENCOUNTER (OUTPATIENT)
Dept: LAB | Age: 64
Discharge: HOME | End: 2019-11-26
Payer: COMMERCIAL

## 2019-11-26 VITALS
RESPIRATION RATE: 20 BRPM | SYSTOLIC BLOOD PRESSURE: 142 MMHG | BODY MASS INDEX: 31.64 KG/M2 | OXYGEN SATURATION: 98 % | HEIGHT: 71 IN | TEMPERATURE: 98.3 F | DIASTOLIC BLOOD PRESSURE: 72 MMHG | HEART RATE: 72 BPM | WEIGHT: 226 LBS

## 2019-11-26 DIAGNOSIS — E55.9 VITAMIN D DEFICIENCY: ICD-10-CM

## 2019-11-26 DIAGNOSIS — N13.8 BPH WITH OBSTRUCTION/LOWER URINARY TRACT SYMPTOMS: ICD-10-CM

## 2019-11-26 DIAGNOSIS — I10 ESSENTIAL HYPERTENSION: Primary | ICD-10-CM

## 2019-11-26 DIAGNOSIS — N40.1 BPH WITH OBSTRUCTION/LOWER URINARY TRACT SYMPTOMS: ICD-10-CM

## 2019-11-26 DIAGNOSIS — K50.10 CROHN'S DISEASE OF COLON WITHOUT COMPLICATION (HCC): ICD-10-CM

## 2019-11-26 DIAGNOSIS — I48.11 LONGSTANDING PERSISTENT ATRIAL FIBRILLATION (HCC): ICD-10-CM

## 2019-11-26 DIAGNOSIS — I10 ESSENTIAL HYPERTENSION: ICD-10-CM

## 2019-11-26 DIAGNOSIS — R73.03 PREDIABETES: ICD-10-CM

## 2019-11-26 DIAGNOSIS — Z23 ENCOUNTER FOR IMMUNIZATION: ICD-10-CM

## 2019-11-26 PROCEDURE — 80053 COMPREHEN METABOLIC PANEL: CPT

## 2019-11-26 PROCEDURE — 36415 COLL VENOUS BLD VENIPUNCTURE: CPT

## 2019-11-26 PROCEDURE — 82306 VITAMIN D 25 HYDROXY: CPT

## 2019-11-26 PROCEDURE — 80061 LIPID PANEL: CPT

## 2019-11-26 PROCEDURE — 83036 HEMOGLOBIN GLYCOSYLATED A1C: CPT

## 2019-11-26 RX ORDER — DILTIAZEM HYDROCHLORIDE 240 MG/1
240 CAPSULE, COATED, EXTENDED RELEASE ORAL DAILY
Qty: 30 CAP | Refills: 5 | Status: CANCELLED | OUTPATIENT
Start: 2019-11-26

## 2019-11-26 NOTE — PROGRESS NOTES
Josh Puentes Sr is a 59 y.o.  male and presents with Hypertension; Diabetes (f/u); Vitamin D Deficiency; Immunization/Injection (flu vaccine); Crohn's Disease; and Benign Prostatic Hypertrophy      SUBJECTIVE:  Pt's  BP is borderline controlled on on Cardizem 240 mg. He denies any fatigue. He is followed by Cardiology for his Afib, He will continue to monitor his BP at home and try to lose 20 lbs. Pt's BPH and LUTS are fairly well controlled on Uroxatral. Pt is followed by urology. He had a negative biopsy for prostate cancer. Pt is followed by GI for his Crohn's disease which is fairly stable on mesalamine. Pt continues to try and cut back the sweats and carbs in his diet to improve his prediabetes and lose 20 lbs. .  Allergies are well controlled on Claritin-D and Flonase and Singulair. Pt hasVit D deficiency and will start to take vit D 2000 units/day. Respiratory ROS: negative for - shortness of breath  Cardiovascular ROS: negative for - chest pain    Current Outpatient Medications   Medication Sig    alfuzosin SR (UROXATRAL) 10 mg SR tablet Take 1 Tab by mouth daily. Indications: enlarged prostate with urination problem    azelastine (ASTELIN) 137 mcg (0.1 %) nasal spray 2 Sprays by Both Nostrils route two (2) times a day. Use in each nostril as directed    fluticasone (FLONASE) 50 mcg/actuation nasal spray INSERT TWO SPRAYS IN EACH NOSTRIL EVERY DAY    dilTIAZem CD (CARDIZEM CD) 240 mg ER capsule Take 1 Cap by mouth daily.  loratadine-pseudoephedrine (CLARITIN-D 24 HOUR)  mg per tablet Take 1 Tab by mouth daily.  flecainide (TAMBOCOR) 50 mg tablet 50 mg.    aspirin 81 mg chewable tablet 81 mg.    mesalamine EC (ASACOL) 400 mg EC tablet Take 400 mg by mouth three (3) times daily.  levoFLOXacin (LEVAQUIN) 750 mg tablet Take 1 by mouth daily beginning 2 days prior to procedure. No current facility-administered medications for this visit.           OBJECTIVE:  alert, well appearing, and in no distress  Visit Vitals  /72 (BP 1 Location: Left arm, BP Patient Position: Sitting)   Pulse 72   Temp 98.3 °F (36.8 °C) (Oral)   Resp 20   Ht 5' 11\" (1.803 m)   Wt 226 lb (102.5 kg)   SpO2 98%   BMI 31.52 kg/m²      well developed and well nourished  Chest - clear to auscultation, no wheezes, rales or rhonchi, symmetric air entry  Heart - normal rate, regular rhythm, normal S1, S2, no murmurs, rubs, clicks or gallops  Extremities - peripheral pulses normal, no pedal edema, no clubbing or cyanosis    Labs:   Lab Results   Component Value Date/Time    Cholesterol, total 169 05/07/2019 09:34 AM    HDL Cholesterol 43 05/07/2019 09:34 AM    LDL, calculated 95.6 05/07/2019 09:34 AM    Triglyceride 152 (H) 05/07/2019 09:34 AM    CHOL/HDL Ratio 3.9 05/07/2019 09:34 AM     Lab Results   Component Value Date/Time    ALT (SGPT) 38 05/07/2019 09:34 AM    AST (SGOT) 24 05/07/2019 09:34 AM    Alk.  phosphatase 96 05/07/2019 09:34 AM    Bilirubin, total 0.3 05/07/2019 09:34 AM     Lab Results   Component Value Date/Time    GFR est AA >60 05/07/2019 09:34 AM    GFR est non-AA >60 05/07/2019 09:34 AM    Creatinine 0.96 05/07/2019 09:34 AM    BUN 9 05/07/2019 09:34 AM    Sodium 138 05/07/2019 09:34 AM    Potassium 4.1 05/07/2019 09:34 AM    Chloride 106 05/07/2019 09:34 AM    CO2 26 05/07/2019 09:34 AM      Lab Results   Component Value Date/Time    Prostate Specific Ag 5.02 (H) 07/11/2019 10:50 AM    Prostate Specific Ag 5.0 (H) 04/09/2019 09:41 AM    Prostate Specific Ag 3.6 04/09/2018 08:58 AM    Prostate Specific Ag 2.5 02/16/2017 09:14 AM    Prostate Specific Ag 2.1 02/04/2015 08:53 AM    Prostate Specific Ag 2.0 01/27/2014 08:56 AM     Lab Results   Component Value Date/Time    Glucose 91 05/07/2019 09:34 AM      Labs:   Lab Results   Component Value Date/Time    Hemoglobin A1c 6.2 (H) 01/29/2019 09:08 AM    Hemoglobin A1c 6.3 (H) 08/01/2018 09:28 AM    Hemoglobin A1c 6.2 (H) 08/16/2017 11:01 AM Glucose 91 05/07/2019 09:34 AM    LDL, calculated 95.6 05/07/2019 09:34 AM    Creatinine 0.96 05/07/2019 09:34 AM          Assessment/Plan      ICD-10-CM ICD-9-CM    1. Essential hypertension I10 401.9  borderline controlled on Cardizem  mg daily. Patient followed by cardiology LIPID PANEL      METABOLIC PANEL, COMPREHENSIVE   2. Longstanding persistent atrial fibrillation I48.11 427.31  patient stable on Cardizem and flecainide. He is followed by [facility] cardiology   3. Prediabetes R73.03 790.29  patient trying to keep under control with diet and weight loss hEMOGLOBIN A1C W/O EAG   4. Vitamin D deficiency E55.9 268.9  uncontrolled we will start on vitamin D 2000 units/day vITAMIN D, 25 HYDROXY   5. Encounter for immunization Z23 V03.89 INFLUENZA VIRUS VAC QUAD,SPLIT,PRESV FREE SYRINGE IM   6. BPH with obstruction/lower urinary tract symptoms N40.1 600.01  controlled on Uroxatrol patient followed by urology    N13.8 599.69    7. Crohn's disease of colon without complication ([facility] [address] M58.45 555.1  stable on mesalamine. Patient followed by Dr. Yolanda Edwards gastroenterology       Follow-up and Dispositions    · Return in about 6 months (around 5/26/2020) for labs 1 week before. Reviewed plan of care. Patient has provided input and agrees with goals.

## 2019-11-26 NOTE — PATIENT INSTRUCTIONS
High Blood Pressure: Care Instructions Overview It's normal for blood pressure to go up and down throughout the day. But if it stays up, you have high blood pressure. Another name for high blood pressure is hypertension. Despite what a lot of people think, high blood pressure usually doesn't cause headaches or make you feel dizzy or lightheaded. It usually has no symptoms. But it does increase your risk of stroke, heart attack, and other problems. You and your doctor will talk about your risks of these problems based on your blood pressure. Your doctor will give you a goal for your blood pressure. Your goal will be based on your health and your age. Lifestyle changes, such as eating healthy and being active, are always important to help lower blood pressure. You might also take medicine to reach your blood pressure goal. 
Follow-up care is a key part of your treatment and safety. Be sure to make and go to all appointments, and call your doctor if you are having problems. It's also a good idea to know your test results and keep a list of the medicines you take. How can you care for yourself at home? Medical treatment · If you stop taking your medicine, your blood pressure will go back up. You may take one or more types of medicine to lower your blood pressure. Be safe with medicines. Take your medicine exactly as prescribed. Call your doctor if you think you are having a problem with your medicine. · Talk to your doctor before you start taking aspirin every day. Aspirin can help certain people lower their risk of a heart attack or stroke. But taking aspirin isn't right for everyone, because it can cause serious bleeding. · See your doctor regularly. You may need to see the doctor more often at first or until your blood pressure comes down. · If you are taking blood pressure medicine, talk to your doctor before you take decongestants or anti-inflammatory medicine, such as ibuprofen. Some of these medicines can raise blood pressure. · Learn how to check your blood pressure at home. Lifestyle changes · Stay at a healthy weight. This is especially important if you put on weight around the waist. Losing even 10 pounds can help you lower your blood pressure. · If your doctor recommends it, get more exercise. Walking is a good choice. Bit by bit, increase the amount you walk every day. Try for at least 30 minutes on most days of the week. You also may want to swim, bike, or do other activities. · Avoid or limit alcohol. Talk to your doctor about whether you can drink any alcohol. · Try to limit how much sodium you eat to less than 2,300 milligrams (mg) a day. Your doctor may ask you to try to eat less than 1,500 mg a day. · Eat plenty of fruits (such as bananas and oranges), vegetables, legumes, whole grains, and low-fat dairy products. · Lower the amount of saturated fat in your diet. Saturated fat is found in animal products such as milk, cheese, and meat. Limiting these foods may help you lose weight and also lower your risk for heart disease. · Do not smoke. Smoking increases your risk for heart attack and stroke. If you need help quitting, talk to your doctor about stop-smoking programs and medicines. These can increase your chances of quitting for good. When should you call for help? Call  911 anytime you think you may need emergency care. This may mean having symptoms that suggest that your blood pressure is causing a serious heart or blood vessel problem. Your blood pressure may be over 180/120. 
 For example, call  911 if: 
  · You have symptoms of a heart attack. These may include: 
? Chest pain or pressure, or a strange feeling in the chest. 
? Sweating. ? Shortness of breath. ? Nausea or vomiting. ? Pain, pressure, or a strange feeling in the back, neck, jaw, or upper belly or in one or both shoulders or arms. ? Lightheadedness or sudden weakness. ? A fast or irregular heartbeat.  
  · You have symptoms of a stroke. These may include: 
? Sudden numbness, tingling, weakness, or loss of movement in your face, arm, or leg, especially on only one side of your body. ? Sudden vision changes. ? Sudden trouble speaking. ? Sudden confusion or trouble understanding simple statements. ? Sudden problems with walking or balance. ? A sudden, severe headache that is different from past headaches.  
  · You have severe back or belly pain.  
 Do not wait until your blood pressure comes down on its own. Get help right away. 
 Call your doctor now or seek immediate care if: 
  · Your blood pressure is much higher than normal (such as 180/120 or higher), but you don't have symptoms.  
  · You think high blood pressure is causing symptoms, such as: 
? Severe headache. 
? Blurry vision.  
 Watch closely for changes in your health, and be sure to contact your doctor if: 
  · Your blood pressure measures higher than your doctor recommends at least 2 times. That means the top number is higher or the bottom number is higher, or both.  
  · You think you may be having side effects from your blood pressure medicine. Where can you learn more? Go to http://luzmaria-bradly.info/. Enter I593 in the search box to learn more about \"High Blood Pressure: Care Instructions. \" Current as of: April 9, 2019 Content Version: 12.2 © 8200-2817 [employer]. Care instructions adapted under license by [employer] (which disclaims liability or warranty for this information). If you have questions about a medical condition or this instruction, always ask your healthcare professional. [place] [address] any warranty or liability for your use of this information.

## 2019-11-26 NOTE — PROGRESS NOTES
Patient is in the office today for a 6 month follow up. 1. Have you been to the ER, urgent care clinic since your last visit? Hospitalized since your last visit? No    2. Have you seen or consulted any other health care providers outside of the [address] [place] [address] since your last visit? Include any pap smears or colon screening. No      Verbal order read back per Dr. Marco A Otoole flu vaccine. Patient received flu vaccine in right deltoid. Patient was observed for 15 minutes and no signs or symptoms of allergic reaction noted at this time. Patient tolerated well and left without complaints. Patient received flu VIS.

## 2019-11-27 LAB
25(OH)D3 SERPL-MCNC: 17.7 NG/ML (ref 30–100)
ALBUMIN SERPL-MCNC: 4 G/DL (ref 3.4–5)
ALBUMIN/GLOB SERPL: 1.2 {RATIO} (ref 0.8–1.7)
ALP SERPL-CCNC: 86 U/L (ref 45–117)
ALT SERPL-CCNC: 37 U/L (ref 16–61)
ANION GAP SERPL CALC-SCNC: 3 MMOL/L (ref 3–18)
AST SERPL-CCNC: 21 U/L (ref 10–38)
BILIRUB SERPL-MCNC: 0.4 MG/DL (ref 0.2–1)
BUN SERPL-MCNC: 10 MG/DL (ref 7–18)
BUN/CREAT SERPL: 9 (ref 12–20)
CALCIUM SERPL-MCNC: 10.1 MG/DL (ref 8.5–10.1)
CHLORIDE SERPL-SCNC: 108 MMOL/L (ref 100–111)
CHOLEST SERPL-MCNC: 182 MG/DL
CO2 SERPL-SCNC: 28 MMOL/L (ref 21–32)
CREAT SERPL-MCNC: 1.06 MG/DL (ref 0.6–1.3)
GLOBULIN SER CALC-MCNC: 3.3 G/DL (ref 2–4)
GLUCOSE SERPL-MCNC: 73 MG/DL (ref 74–99)
HBA1C MFR BLD: 6.2 % (ref 4.2–5.6)
HDLC SERPL-MCNC: 39 MG/DL (ref 40–60)
HDLC SERPL: 4.7 {RATIO} (ref 0–5)
LDLC SERPL CALC-MCNC: 113.8 MG/DL (ref 0–100)
LIPID PROFILE,FLP: ABNORMAL
POTASSIUM SERPL-SCNC: 4.1 MMOL/L (ref 3.5–5.5)
PROT SERPL-MCNC: 7.3 G/DL (ref 6.4–8.2)
SODIUM SERPL-SCNC: 139 MMOL/L (ref 136–145)
TRIGL SERPL-MCNC: 146 MG/DL (ref ?–150)
VLDLC SERPL CALC-MCNC: 29.2 MG/DL

## 2019-12-29 NOTE — PROGRESS NOTES
Tell patient his/her vit D level is low and he should take vit D 2000 units/day OTC.  Rest of blood work is good

## 2020-01-02 NOTE — PROGRESS NOTES
Patient aware   vit D level is low and he should take vit D 2000 units/day OTC. Rest of blood work is good. Patient verbalizes understanding.

## 2020-04-23 NOTE — PATIENT INSTRUCTIONS
----- Message from Ariane Ramirez MD sent at 4/23/2020 10:23 AM CDT -----  Please notify patient/daughter her wrist x-rays shows arthritis changes,no fracture. We can discuss further at upcoming appt.   Prostate Cancer Screening: Care Instructions  Your Care Instructions    The prostate gland is an organ found just below a man's bladder. It is the size and shape of a walnut. It surrounds the tube that carries urine from the bladder out of the body through the penis. This tube is called the urethra. Prostate cancer is the abnormal growth of cells in the prostate. It is the second most common type of cancer in men. (Skin cancer is the most common.)  Most cases of prostate cancer occur in men older than 72. The disease runs in families. And it's more common in -American men. When it's found and treated early, prostate cancer may be cured. But it is not always treated. This is because prostate cancer may not shorten your life, especially if you are older and the cancer is growing slowly. Follow-up care is a key part of your treatment and safety. Be sure to make and go to all appointments, and call your doctor if you are having problems. It's also a good idea to know your test results and keep a list of the medicines you take. What are the screening tests for prostate cancer? The main screening test for prostate cancer is the prostate-specific antigen (PSA) test. This is a blood test that measures how much PSA is in your blood. A high level may mean that you have an enlargement, an infection, or cancer. Along with the PSA test, you may have a digital rectal exam. The digital (finger) rectal exam checks for anything abnormal in your prostate. To do the exam, the doctor puts a lubricated, gloved finger into your rectum. If these tests suggest cancer, you may need a prostate biopsy. How is prostate cancer diagnosed? In a biopsy, the doctor takes small tissue samples from your prostate gland. Another doctor then looks at the tissue under a microscope to see if there are cancer cells, signs of infection, or other problems. The results help diagnose prostate cancer.   What are the pros and cons of screening? Neither a PSA test nor a digital rectal exam can tell you for sure that you do or do not have cancer. But they can help you decide if you need more tests, such as a prostate biopsy. Screening tests may be useful because most men with prostate cancer don't have symptoms. It can be hard to know if you have cancer until it is more advanced. And then it's harder to treat. But having a PSA test can also cause harm. The test may show high levels of PSA that aren't caused by cancer. So you could have a prostate biopsy you didn't need. Or the PSA test might be normal when there is cancer, so a cancer might not be found early. The test can also find cancers that would never have caused a problem during your lifetime. So you might have treatment that was not needed. Prostate cancer usually develops late in life and grows slowly. For many men, it does not shorten their lives. Some experts advise screening only for men who are at high risk. Talk with your doctor to see if screening is right for you. Where can you learn more? Go to http://luzmaria-bradly.info/. Enter R550 in the search box to learn more about \"Prostate Cancer Screening: Care Instructions. \"  Current as of: March 27, 2018  Content Version: 11.9  © 1783-1882 [employer]. Care instructions adapted under license by new test company (which disclaims liability or warranty for this information). If you have questions about a medical condition or this instruction, always ask your healthcare professional. [place] [address] any warranty or liability for your use of this information.

## 2020-06-17 ENCOUNTER — VIRTUAL VISIT (OUTPATIENT)
Dept: INTERNAL MEDICINE CLINIC | Age: 65
End: 2020-06-17

## 2020-06-17 DIAGNOSIS — E55.9 VITAMIN D DEFICIENCY: ICD-10-CM

## 2020-06-17 DIAGNOSIS — I10 ESSENTIAL HYPERTENSION: Primary | ICD-10-CM

## 2020-06-17 DIAGNOSIS — R73.03 PREDIABETES: ICD-10-CM

## 2020-06-17 NOTE — PROGRESS NOTES
Jazlyn Choe Sr 59 y.o. male who was seen by synchronous (real-time) audio-video technology on 06/17/20    Consent:  heand/or his healthcare decision maker is aware that this patient-initiated Telehealth encounter is a billable service, with coverage as determined by her insurance carrier. he is aware that he may receive a bill and has provided verbal consent to proceed: Yes I was in my office while conducting this encounter. The patient was in their home. Aidan Valenzuela is a 59 y.o.  male and presents with Vitamin D Deficiency; Blood sugar problem; and Hypertension      SUBJECTIVE:  Pt's  BP was borderline controlled on on Cardizem 240 mg. He does not have any recent readings. He denies any fatigue. He is followed by Cardiology for his Afib, He will continue to monitor his BP at home and try to lose 20 lbs. Pt's BPH and LUTS are fairly well controlled on Uroxatral. Pt is followed by urology. He had a negative biopsy for prostate cancer. Pt is followed by GI for his Crohn's disease which is fairly stable on mesalamine. Pt continues to try and cut back the sweats and carbs in his diet to improve his prediabetes and lose 20 lbs. .  Allergies are well controlled on Claritin-D and Flonase and Singulair. Pt hasVit D deficiency and is on vit D 2000 units/day. Will check levels soon. Respiratory ROS: negative for - shortness of breath  Cardiovascular ROS: negative for - chest pain    Current Outpatient Medications   Medication Sig    alfuzosin SR (UROXATRAL) 10 mg SR tablet Take 1 Tab by mouth daily. Indications: enlarged prostate with urination problem    levoFLOXacin (LEVAQUIN) 750 mg tablet Take 1 by mouth daily beginning 2 days prior to procedure.  azelastine (ASTELIN) 137 mcg (0.1 %) nasal spray 2 Sprays by Both Nostrils route two (2) times a day.  Use in each nostril as directed    fluticasone (FLONASE) 50 mcg/actuation nasal spray INSERT TWO SPRAYS IN EACH NOSTRIL EVERY DAY    dilTIAZem CD (CARDIZEM CD) 240 mg ER capsule Take 1 Cap by mouth daily.  loratadine-pseudoephedrine (CLARITIN-D 24 HOUR)  mg per tablet Take 1 Tab by mouth daily.  flecainide (TAMBOCOR) 50 mg tablet 50 mg.    aspirin 81 mg chewable tablet 81 mg.    mesalamine EC (ASACOL) 400 mg EC tablet Take 400 mg by mouth three (3) times daily. No current facility-administered medications for this visit. OBJECTIVE:  alert, well appearing, and in no distress  There were no vitals taken for this visit. well developed and well nourished      Labs:   Lab Results   Component Value Date/Time    Cholesterol, total 182 11/26/2019 09:50 AM    HDL Cholesterol 39 (L) 11/26/2019 09:50 AM    LDL, calculated 113.8 (H) 11/26/2019 09:50 AM    Triglyceride 146 11/26/2019 09:50 AM    CHOL/HDL Ratio 4.7 11/26/2019 09:50 AM     Lab Results   Component Value Date/Time    ALT (SGPT) 37 11/26/2019 09:50 AM    Alk.  phosphatase 86 11/26/2019 09:50 AM    Bilirubin, total 0.4 11/26/2019 09:50 AM     Lab Results   Component Value Date/Time    GFR est AA >60 11/26/2019 09:50 AM    GFR est non-AA >60 11/26/2019 09:50 AM    Creatinine 1.06 11/26/2019 09:50 AM    BUN 10 11/26/2019 09:50 AM    Sodium 139 11/26/2019 09:50 AM    Potassium 4.1 11/26/2019 09:50 AM    Chloride 108 11/26/2019 09:50 AM    CO2 28 11/26/2019 09:50 AM      Lab Results   Component Value Date/Time    Prostate Specific Ag 5.02 (H) 07/11/2019 10:50 AM    Prostate Specific Ag 5.0 (H) 04/09/2019 09:41 AM    Prostate Specific Ag 3.6 04/09/2018 08:58 AM    Prostate Specific Ag 2.5 02/16/2017 09:14 AM    Prostate Specific Ag 2.1 02/04/2015 08:53 AM    Prostate Specific Ag 2.0 01/27/2014 08:56 AM     Lab Results   Component Value Date/Time    Glucose 73 (L) 11/26/2019 09:50 AM      Labs:   Lab Results   Component Value Date/Time    Hemoglobin A1c 6.2 (H) 11/26/2019 09:50 AM    Hemoglobin A1c 6.2 (H) 01/29/2019 09:08 AM    Hemoglobin A1c 6.3 (H) 08/01/2018 09:28 AM Glucose 73 (L) 11/26/2019 09:50 AM    LDL, calculated 113.8 (H) 11/26/2019 09:50 AM    Creatinine 1.06 11/26/2019 09:50 AM          Assessment/Plan      ICD-10-CM ICD-9-CM    1. Essential hypertension I10 401.9 Borderline controlled on Cardizem 240 mg. Pt to monitor BP at  home. METABOLIC PANEL, COMPREHENSIVE      LIPID PANEL   2. Vitamin D deficiency E55.9 268.9 Status unknown but he is back on vit D 2000 units/day VITAMIN D, 25 HYDROXY   3. Prediabetes R73.03 790.29 Pt trying to control with weight loss HEMOGLOBIN A1C W/O EAG       Follow-up and Dispositions    · Return in about 3 months (around 9/17/2020) for labs 1 week before. Reviewed plan of care. Patient has provided input and agrees with goals.

## 2020-06-18 ENCOUNTER — TELEPHONE (OUTPATIENT)
Dept: INTERNAL MEDICINE CLINIC | Age: 65
End: 2020-06-18

## 2020-06-18 NOTE — TELEPHONE ENCOUNTER
----- Message from Hortensia Bauer MD sent at 6/17/2020  8:49 AM EDT -----  Pt needs OV in 3 months and labs 1 week before

## 2020-09-16 ENCOUNTER — HOSPITAL ENCOUNTER (OUTPATIENT)
Dept: LAB | Age: 65
Discharge: HOME | End: 2020-09-16
Payer: COMMERCIAL

## 2020-09-16 ENCOUNTER — APPOINTMENT (OUTPATIENT)
Dept: INTERNAL MEDICINE CLINIC | Age: 65
End: 2020-09-16

## 2020-09-16 DIAGNOSIS — E55.9 VITAMIN D DEFICIENCY: ICD-10-CM

## 2020-09-16 DIAGNOSIS — I10 ESSENTIAL HYPERTENSION: ICD-10-CM

## 2020-09-16 DIAGNOSIS — R73.03 PREDIABETES: ICD-10-CM

## 2020-09-16 LAB
25(OH)D3 SERPL-MCNC: 33 NG/ML (ref 30–100)
ALBUMIN SERPL-MCNC: 3.5 G/DL (ref 3.4–5)
ALBUMIN/GLOB SERPL: 1 {RATIO} (ref 0.8–1.7)
ALP SERPL-CCNC: 87 U/L (ref 45–117)
ALT SERPL-CCNC: 40 U/L (ref 16–61)
ANION GAP SERPL CALC-SCNC: 3 MMOL/L (ref 3–18)
AST SERPL-CCNC: 21 U/L (ref 10–38)
BILIRUB SERPL-MCNC: 0.2 MG/DL (ref 0.2–1)
BUN SERPL-MCNC: 11 MG/DL (ref 7–18)
BUN/CREAT SERPL: 10 (ref 12–20)
CALCIUM SERPL-MCNC: 9.4 MG/DL (ref 8.5–10.1)
CHLORIDE SERPL-SCNC: 109 MMOL/L (ref 100–111)
CHOLEST SERPL-MCNC: 158 MG/DL
CO2 SERPL-SCNC: 29 MMOL/L (ref 21–32)
CREAT SERPL-MCNC: 1.09 MG/DL (ref 0.6–1.3)
GLOBULIN SER CALC-MCNC: 3.5 G/DL (ref 2–4)
GLUCOSE SERPL-MCNC: 112 MG/DL (ref 74–99)
HBA1C MFR BLD: 6.1 % (ref 4.2–5.6)
HDLC SERPL-MCNC: 41 MG/DL (ref 40–60)
HDLC SERPL: 3.9 {RATIO} (ref 0–5)
LDLC SERPL CALC-MCNC: 92.6 MG/DL (ref 0–100)
LIPID PROFILE,FLP: NORMAL
POTASSIUM SERPL-SCNC: 4 MMOL/L (ref 3.5–5.5)
PROT SERPL-MCNC: 7 G/DL (ref 6.4–8.2)
SODIUM SERPL-SCNC: 141 MMOL/L (ref 136–145)
TRIGL SERPL-MCNC: 122 MG/DL (ref ?–150)
VLDLC SERPL CALC-MCNC: 24.4 MG/DL

## 2020-09-16 PROCEDURE — 83036 HEMOGLOBIN GLYCOSYLATED A1C: CPT

## 2020-09-16 PROCEDURE — 36415 COLL VENOUS BLD VENIPUNCTURE: CPT

## 2020-09-16 PROCEDURE — 80061 LIPID PANEL: CPT

## 2020-09-16 PROCEDURE — 80053 COMPREHEN METABOLIC PANEL: CPT

## 2020-09-16 PROCEDURE — 82306 VITAMIN D 25 HYDROXY: CPT

## 2020-09-23 ENCOUNTER — OFFICE VISIT (OUTPATIENT)
Dept: INTERNAL MEDICINE CLINIC | Age: 65
End: 2020-09-23
Payer: COMMERCIAL

## 2020-09-23 VITALS
TEMPERATURE: 97.8 F | RESPIRATION RATE: 20 BRPM | BODY MASS INDEX: 30.88 KG/M2 | DIASTOLIC BLOOD PRESSURE: 78 MMHG | OXYGEN SATURATION: 97 % | HEART RATE: 60 BPM | WEIGHT: 228 LBS | HEIGHT: 72 IN | SYSTOLIC BLOOD PRESSURE: 153 MMHG

## 2020-09-23 DIAGNOSIS — Z23 NEEDS FLU SHOT: ICD-10-CM

## 2020-09-23 DIAGNOSIS — E55.9 VITAMIN D DEFICIENCY: ICD-10-CM

## 2020-09-23 DIAGNOSIS — I10 ESSENTIAL HYPERTENSION: Primary | ICD-10-CM

## 2020-09-23 DIAGNOSIS — R73.03 PREDIABETES: ICD-10-CM

## 2020-09-23 DIAGNOSIS — J30.1 SEASONAL ALLERGIC RHINITIS DUE TO POLLEN: ICD-10-CM

## 2020-09-23 DIAGNOSIS — I48.11 LONGSTANDING PERSISTENT ATRIAL FIBRILLATION (HCC): ICD-10-CM

## 2020-09-23 DIAGNOSIS — K50.10 CROHN'S DISEASE OF COLON WITHOUT COMPLICATION (HCC): ICD-10-CM

## 2020-09-23 PROCEDURE — 90471 IMMUNIZATION ADMIN: CPT | Performed by: INTERNAL MEDICINE

## 2020-09-23 PROCEDURE — 90686 IIV4 VACC NO PRSV 0.5 ML IM: CPT | Performed by: INTERNAL MEDICINE

## 2020-09-23 PROCEDURE — 99214 OFFICE O/P EST MOD 30 MIN: CPT | Performed by: INTERNAL MEDICINE

## 2020-09-23 RX ORDER — AZELASTINE 1 MG/ML
2 SPRAY, METERED NASAL 2 TIMES DAILY
Qty: 1 BOTTLE | Refills: 2 | Status: SHIPPED | OUTPATIENT
Start: 2020-09-23 | End: 2021-01-14

## 2020-09-23 NOTE — PROGRESS NOTES
Sudhakar Otto is a 59 y.o.  male and presents with Hypertension; Vitamin D Deficiency (4000/day ); Blood sugar problem (f/u); Immunization/Injection (flu vaccine); and Obesity      SUBJECTIVE:  Pt's  BP is borderline controlled on on Cardizem 240 mg. He denies any fatigue. He is followed by Cardiology for his Afib, He will continue to monitor his BP at home and try to lose 20 lbs. Pt's BPH and LUTS are fairly well controlled on Uroxatral. Pt is followed by urology. He had a negative biopsy for prostate cancer. Pt is followed by GI for his Crohn's disease which is fairly stable on mesalamine. Pt continues to try and cut back the sweats and carbs in his diet to improve his prediabetes and lose 20 lbs. .  Allergies are well controlled on Claritin-D and Flonase and Singulair. Pt hasVit D deficiency and will  take vit D 4000 units/day yo get levels optimally controlled. Respiratory ROS: negative for - shortness of breath  Cardiovascular ROS: negative for - chest pain    Current Outpatient Medications   Medication Sig    azelastine (ASTELIN) 137 mcg (0.1 %) nasal spray 2 Sprays by Both Nostrils route two (2) times a day. Use in each nostril as directed    alfuzosin SR (UROXATRAL) 10 mg SR tablet Take 1 Tab by mouth daily (after dinner).  fluticasone (FLONASE) 50 mcg/actuation nasal spray INSERT TWO SPRAYS IN EACH NOSTRIL EVERY DAY    dilTIAZem CD (CARDIZEM CD) 240 mg ER capsule Take 1 Cap by mouth daily.  loratadine-pseudoephedrine (CLARITIN-D 24 HOUR)  mg per tablet Take 1 Tab by mouth daily.  flecainide (TAMBOCOR) 50 mg tablet 50 mg.    aspirin 81 mg chewable tablet 81 mg.    mesalamine EC (ASACOL) 400 mg EC tablet Take 400 mg by mouth three (3) times daily.  alfuzosin SR (UROXATRAL) 10 mg SR tablet Take 1 Tab by mouth daily. Indications: enlarged prostate with urination problem     No current facility-administered medications for this visit.           OBJECTIVE:  alert, well appearing, and in no distress  Visit Vitals  BP (!) 153/78 (BP 1 Location: Left arm, BP Patient Position: Sitting)   Pulse 60   Temp 97.8 °F (36.6 °C) (Temporal)   Resp 20   Ht 5' 11.5\" (1.816 m)   Wt 228 lb (103.4 kg)   SpO2 97%   BMI 31.36 kg/m²      well developed and well nourished  Chest - clear to auscultation, no wheezes, rales or rhonchi, symmetric air entry  Heart - normal rate, regular rhythm, normal S1, S2, no murmurs, rubs, clicks or gallops  Extremities - peripheral pulses normal, no pedal edema, no clubbing or cyanosis    Labs:   Lab Results   Component Value Date/Time    Cholesterol, total 158 09/16/2020 08:58 AM    HDL Cholesterol 41 09/16/2020 08:58 AM    LDL, calculated 92.6 09/16/2020 08:58 AM    Triglyceride 122 09/16/2020 08:58 AM    CHOL/HDL Ratio 3.9 09/16/2020 08:58 AM     Lab Results   Component Value Date/Time    ALT (SGPT) 40 09/16/2020 08:58 AM    Alk.  phosphatase 87 09/16/2020 08:58 AM    Bilirubin, total 0.2 09/16/2020 08:58 AM     Lab Results   Component Value Date/Time    GFR est AA >60 09/16/2020 08:58 AM    GFR est non-AA >60 09/16/2020 08:58 AM    Creatinine 1.09 09/16/2020 08:58 AM    BUN 11 09/16/2020 08:58 AM    Sodium 141 09/16/2020 08:58 AM    Potassium 4.0 09/16/2020 08:58 AM    Chloride 109 09/16/2020 08:58 AM    CO2 29 09/16/2020 08:58 AM      Lab Results   Component Value Date/Time    Prostate Specific Ag 4.58 (H) 09/03/2020 09:32 AM    Prostate Specific Ag 5.02 (H) 07/11/2019 10:50 AM    Prostate Specific Ag 5.0 (H) 04/09/2019 09:41 AM    Prostate Specific Ag 3.6 04/09/2018 08:58 AM    Prostate Specific Ag 2.5 02/16/2017 09:14 AM    Prostate Specific Ag 2.1 02/04/2015 08:53 AM     Lab Results   Component Value Date/Time    Glucose 112 (H) 09/16/2020 08:58 AM      Labs:   Lab Results   Component Value Date/Time    Hemoglobin A1c 6.1 (H) 09/16/2020 08:58 AM    Hemoglobin A1c 6.2 (H) 11/26/2019 09:50 AM    Hemoglobin A1c 6.2 (H) 01/29/2019 09:08 AM    Glucose 112 (H) 09/16/2020 08:58 AM    LDL, calculated 92.6 09/16/2020 08:58 AM    Creatinine 1.09 09/16/2020 08:58 AM          Assessment/Plan      ICD-10-CM ICD-9-CM    1. Essential hypertension  I10 401.9  uncontrolled in the office. Patient may have whitecoat hypertension. Patient will bring in his blood pressure cuff and readings at next office visit continue on Cardizem LIPID PANEL      METABOLIC PANEL, COMPREHENSIVE   2. Prediabetes  R73.03 790.29  controlled currently with diet. Patient will continue to try and lose weight HEMOGLOBIN A1C W/O EAG   3. Longstanding persistent atrial fibrillation (HCC)  I48.11 427.31  stable on Cardizem and antiarrhythmic which is followed by cardiology   4. Vitamin D deficiency  E55.9 268.9  fairly stable patient to continue vitamin D 4000 units/day VITAMIN D, 25 HYDROXY   5. [facility]  Y30.23 555.1  patient stable on mesalamine followed by GI   6. Needs flu shot  Z23 V04.81 INFLUENZA VIRUS VAC QUAD,SPLIT,PRESV FREE SYRINGE IM   7. Seasonal allergic rhinitis due to pollen  J30.1 477.0 azelastine (ASTELIN) 137 mcg (0.1 %) nasal spray       Follow-up and Dispositions    · Return in about 4 months (around 1/23/2021) for labs 1 week before. Reviewed plan of care. Patient has provided input and agrees with goals.

## 2020-09-23 NOTE — PROGRESS NOTES
Patient is in the office today for a 3 month follow up. 1. Have you been to the ER, urgent care clinic since your last visit? Hospitalized since your last visit? No    2. Have you seen or consulted any other health care providers outside of the [address] [place] [address] since your last visit? Include any pap smears or colon screening. No      Verbal order read back per Dr. Vear Buerger flu vaccine. Patient received flu vaccine in right deltoid. Patient was observed and no signs or symptoms of allergic reaction noted at this time. Patient tolerated well and left without complaints. Patient received flu VIS.

## 2020-09-23 NOTE — PATIENT INSTRUCTIONS
High Blood Pressure: Care Instructions Overview It's normal for blood pressure to go up and down throughout the day. But if it stays up, you have high blood pressure. Another name for high blood pressure is hypertension. Despite what a lot of people think, high blood pressure usually doesn't cause headaches or make you feel dizzy or lightheaded. It usually has no symptoms. But it does increase your risk of stroke, heart attack, and other problems. You and your doctor will talk about your risks of these problems based on your blood pressure. Your doctor will give you a goal for your blood pressure. Your goal will be based on your health and your age. Lifestyle changes, such as eating healthy and being active, are always important to help lower blood pressure. You might also take medicine to reach your blood pressure goal. 
Follow-up care is a key part of your treatment and safety. Be sure to make and go to all appointments, and call your doctor if you are having problems. It's also a good idea to know your test results and keep a list of the medicines you take. How can you care for yourself at home? Medical treatment · If you stop taking your medicine, your blood pressure will go back up. You may take one or more types of medicine to lower your blood pressure. Be safe with medicines. Take your medicine exactly as prescribed. Call your doctor if you think you are having a problem with your medicine. · Talk to your doctor before you start taking aspirin every day. Aspirin can help certain people lower their risk of a heart attack or stroke. But taking aspirin isn't right for everyone, because it can cause serious bleeding. · See your doctor regularly. You may need to see the doctor more often at first or until your blood pressure comes down. · If you are taking blood pressure medicine, talk to your doctor before you take decongestants or anti-inflammatory medicine, such as ibuprofen. Some of these medicines can raise blood pressure. · Learn how to check your blood pressure at home. Lifestyle changes · Stay at a healthy weight. This is especially important if you put on weight around the waist. Losing even 10 pounds can help you lower your blood pressure. · If your doctor recommends it, get more exercise. Walking is a good choice. Bit by bit, increase the amount you walk every day. Try for at least 30 minutes on most days of the week. You also may want to swim, bike, or do other activities. · Avoid or limit alcohol. Talk to your doctor about whether you can drink any alcohol. · Try to limit how much sodium you eat to less than 2,300 milligrams (mg) a day. Your doctor may ask you to try to eat less than 1,500 mg a day. · Eat plenty of fruits (such as bananas and oranges), vegetables, legumes, whole grains, and low-fat dairy products. · Lower the amount of saturated fat in your diet. Saturated fat is found in animal products such as milk, cheese, and meat. Limiting these foods may help you lose weight and also lower your risk for heart disease. · Do not smoke. Smoking increases your risk for heart attack and stroke. If you need help quitting, talk to your doctor about stop-smoking programs and medicines. These can increase your chances of quitting for good. When should you call for help? Call  911 anytime you think you may need emergency care. This may mean having symptoms that suggest that your blood pressure is causing a serious heart or blood vessel problem. Your blood pressure may be over 180/120. For example, call 911 if: 
  · You have symptoms of a heart attack. These may include: 
? Chest pain or pressure, or a strange feeling in the chest. 
? Sweating. ? Shortness of breath. ? Nausea or vomiting. ? Pain, pressure, or a strange feeling in the back, neck, jaw, or upper belly or in one or both shoulders or arms. ? Lightheadedness or sudden weakness. ? A fast or irregular heartbeat.  
  · You have symptoms of a stroke. These may include: 
? Sudden numbness, tingling, weakness, or loss of movement in your face, arm, or leg, especially on only one side of your body. ? Sudden vision changes. ? Sudden trouble speaking. ? Sudden confusion or trouble understanding simple statements. ? Sudden problems with walking or balance. ? A sudden, severe headache that is different from past headaches.  
  · You have severe back or belly pain. Do not wait until your blood pressure comes down on its own. Get help right away. Call your doctor now or seek immediate care if: 
  · Your blood pressure is much higher than normal (such as 180/120 or higher), but you don't have symptoms.  
  · You think high blood pressure is causing symptoms, such as: 
? Severe headache. 
? Blurry vision. Watch closely for changes in your health, and be sure to contact your doctor if: 
  · Your blood pressure measures higher than your doctor recommends at least 2 times. That means the top number is higher or the bottom number is higher, or both.  
  · You think you may be having side effects from your blood pressure medicine. Where can you learn more? Go to http://luzmaria-bradly.info/ Enter V725 in the search box to learn more about \"High Blood Pressure: Care Instructions. \" Current as of: December 16, 2019               Content Version: 12.6 © 7566-1177 [employer]. Care instructions adapted under license by [employer] (which disclaims liability or warranty for this information). If you have questions about a medical condition or this instruction, always ask your healthcare professional. [place] [address] any warranty or liability for your use of this information. Influenza (Flu) Vaccine (Inactivated or Recombinant): What You Need to Know Why get vaccinated? Influenza vaccine can prevent influenza (flu). Flu is a contagious disease that spreads around the [facility] every year, usually between October and May. Anyone can get the flu, but it is more dangerous for some people. Infants and young children, people 72years of age and older, pregnant women, and people with certain health conditions or a weakened immune system are at greatest risk of flu complications. Pneumonia, bronchitis, sinus infections and ear infections are examples of flu-related complications. If you have a medical condition, such as heart disease, cancer or diabetes, flu can make it worse. Flu can cause fever and chills, sore throat, muscle aches, fatigue, cough, headache, and runny or stuffy nose. Some people may have vomiting and diarrhea, though this is more common in children than adults. Each year, thousands of people in the [facility] die from flu, and many more are hospitalized. Flu vaccine prevents millions of illnesses and flu-related visits to the doctor each year. Influenza vaccine CDC recommends everyone 10months of age and older get vaccinated every flu season. Children 6 months through 6years of age may need 2 doses during a single flu season. Everyone else needs only 1 dose each flu season. It takes about 2 weeks for protection to develop after vaccination. There are many flu viruses, and they are always changing. Each year a new flu vaccine is made to protect against three or four viruses that are likely to cause disease in the upcoming flu season. Even when the vaccine doesn't exactly match these viruses, it may still provide some protection. Influenza vaccine does not cause flu. Influenza vaccine may be given at the same time as other vaccines. Talk with your health care provider Tell your vaccine provider if the person getting the vaccine: 
· Has had an allergic reaction after a previous dose of influenza vaccine, or has any severe, life-threatening allergies. · Has ever had Guillain-Barré Syndrome (also called GBS). In some cases, your health care provider may decide to postpone influenza vaccination to a future visit. People with minor illnesses, such as a cold, may be vaccinated. People who are moderately or severely ill should usually wait until they recover before getting influenza vaccine. Your health care provider can give you more information. Risks of a vaccine reaction · Soreness, redness, and swelling where shot is given, fever, muscle aches, and headache can happen after influenza vaccine. · There may be a very small increased risk of Guillain-Barré Syndrome (GBS) after inactivated influenza vaccine (the flu shot). [employer] children who get the flu shot along with pneumococcal vaccine (PCV13), and/or DTaP vaccine at the same time might be slightly more likely to have a seizure caused by fever. Tell your health care provider if a child who is getting flu vaccine has ever had a seizure. People sometimes faint after medical procedures, including vaccination. Tell your provider if you feel dizzy or have vision changes or ringing in the ears. As with any medicine, there is a very remote chance of a vaccine causing a severe allergic reaction, other serious injury, or death. What if there is a serious problem? An allergic reaction could occur after the vaccinated person leaves the clinic. If you see signs of a severe allergic reaction (hives, swelling of the face and throat, difficulty breathing, a fast heartbeat, dizziness, or weakness), call 9-1-1 and get the person to the nearest hospital. 
For other signs that concern you, call your health care provider. Adverse reactions should be reported to the Vaccine Adverse Event Reporting System (VAERS). Your health care provider will usually file this report, or you can do it yourself. Visit the VAERS website at www.vaers. hhs.gov or call 2-705.108.1343.  VAERS is only for reporting reactions, and [facility] staff do not give medical advice. The National Vaccine Injury Compensation Program 
The National Vaccine Injury Compensation Program (VICP) is a federal program that was created to compensate people who may have been injured by certain vaccines. Visit the VICP website at www.hrsa.gov/vaccinecompensation or call 9-841.813.3671 to learn about the program and about filing a claim. There is a time limit to file a claim for compensation. How can I learn more? · Ask your healthcare provider. · Call your local or state health department. · Contact the Centers for Disease Control and Prevention (CDC): 
? Call 3-574.966.2518 (1-800-CDC-INFO) or 
? Visit CDC's website at www.cdc.gov/flu Vaccine Information Statement (Interim) Inactivated Influenza Vaccine 8/15/2019 
[address] 465FE-89 Department of Health and [place] Centers for Disease Control and Prevention Many Vaccine Information Statements are available in Uz[place] and other languages. See www.immunize.org/vis. Muchas hojas de información sobre vacunas están disponibles en español y en otros idiomas. Visite www.immunize.org/vis. Care instructions adapted under license by [employer] (which disclaims liability or warranty for this information). If you have questions about a medical condition or this instruction, always ask your healthcare professional. [address] any warranty or liability for your use of this information.

## 2020-10-06 ENCOUNTER — HOSPITAL ENCOUNTER (OUTPATIENT)
Dept: GENERAL RADIOLOGY | Age: 65
Discharge: HOME | End: 2020-10-06
Attending: INTERNAL MEDICINE
Payer: COMMERCIAL

## 2020-10-06 DIAGNOSIS — Z86.010 PERSONAL HISTORY OF COLONIC POLYPS: ICD-10-CM

## 2020-10-06 DIAGNOSIS — K50.90 CROHN DISEASE (HCC): ICD-10-CM

## 2020-10-06 DIAGNOSIS — R10.32 ABDOMINAL PAIN, LEFT LOWER QUADRANT: ICD-10-CM

## 2020-10-06 DIAGNOSIS — K62.5 HEMORRHAGE OF RECTUM AND ANUS: ICD-10-CM

## 2020-10-06 DIAGNOSIS — R19.7 DIARRHEA: ICD-10-CM

## 2020-10-06 DIAGNOSIS — I10 HTN (HYPERTENSION): ICD-10-CM

## 2020-10-06 PROCEDURE — 74011000255 HC RX REV CODE- 255: Performed by: INTERNAL MEDICINE

## 2020-10-06 PROCEDURE — 74250 X-RAY XM SM INT 1CNTRST STD: CPT

## 2020-10-06 RX ADMIN — BARIUM SULFATE 600 ML: 240 SUSPENSION ORAL at 09:25

## 2021-01-14 ENCOUNTER — HOSPITAL ENCOUNTER (EMERGENCY)
Age: 66
Discharge: HOME | End: 2021-01-14
Attending: EMERGENCY MEDICINE
Payer: COMMERCIAL

## 2021-01-14 VITALS
TEMPERATURE: 98.3 F | RESPIRATION RATE: 24 BRPM | WEIGHT: 228 LBS | BODY MASS INDEX: 30.88 KG/M2 | DIASTOLIC BLOOD PRESSURE: 100 MMHG | HEIGHT: 72 IN | HEART RATE: 65 BPM | OXYGEN SATURATION: 100 % | SYSTOLIC BLOOD PRESSURE: 171 MMHG

## 2021-01-14 DIAGNOSIS — N36.8 BLEEDING FROM URETHRA IN MALE: Primary | ICD-10-CM

## 2021-01-14 LAB
ANION GAP SERPL CALC-SCNC: 2 MMOL/L (ref 3–18)
BASOPHILS # BLD: 0 K/UL (ref 0–0.1)
BASOPHILS NFR BLD: 0 % (ref 0–2)
BUN SERPL-MCNC: 13 MG/DL (ref 7–18)
BUN/CREAT SERPL: 12 (ref 12–20)
CALCIUM SERPL-MCNC: 9.6 MG/DL (ref 8.5–10.1)
CHLORIDE SERPL-SCNC: 105 MMOL/L (ref 100–111)
CO2 SERPL-SCNC: 32 MMOL/L (ref 21–32)
CREAT SERPL-MCNC: 1.1 MG/DL (ref 0.6–1.3)
DIFFERENTIAL METHOD BLD: ABNORMAL
EOSINOPHIL # BLD: 0.1 K/UL (ref 0–0.4)
EOSINOPHIL NFR BLD: 1 % (ref 0–5)
ERYTHROCYTE [DISTWIDTH] IN BLOOD BY AUTOMATED COUNT: 14.6 % (ref 11.6–14.5)
GLUCOSE SERPL-MCNC: 100 MG/DL (ref 74–99)
HCT VFR BLD AUTO: 37.9 % (ref 36–48)
HGB BLD-MCNC: 12.6 G/DL (ref 13–16)
LYMPHOCYTES # BLD: 2.2 K/UL (ref 0.9–3.6)
LYMPHOCYTES NFR BLD: 22 % (ref 21–52)
MCH RBC QN AUTO: 27.1 PG (ref 24–34)
MCHC RBC AUTO-ENTMCNC: 33.2 G/DL (ref 31–37)
MCV RBC AUTO: 81.5 FL (ref 74–97)
MONOCYTES # BLD: 1.1 K/UL (ref 0.05–1.2)
MONOCYTES NFR BLD: 11 % (ref 3–10)
NEUTS SEG # BLD: 6.7 K/UL (ref 1.8–8)
NEUTS SEG NFR BLD: 66 % (ref 40–73)
PLATELET # BLD AUTO: 270 K/UL (ref 135–420)
PMV BLD AUTO: 9.8 FL (ref 9.2–11.8)
POTASSIUM SERPL-SCNC: 3.3 MMOL/L (ref 3.5–5.5)
RBC # BLD AUTO: 4.65 M/UL (ref 4.7–5.5)
SODIUM SERPL-SCNC: 139 MMOL/L (ref 136–145)
WBC # BLD AUTO: 10 K/UL (ref 4.6–13.2)

## 2021-01-14 PROCEDURE — 74011250636 HC RX REV CODE- 250/636: Performed by: EMERGENCY MEDICINE

## 2021-01-14 PROCEDURE — 80048 BASIC METABOLIC PNL TOTAL CA: CPT

## 2021-01-14 PROCEDURE — 99283 EMERGENCY DEPT VISIT LOW MDM: CPT

## 2021-01-14 PROCEDURE — 96361 HYDRATE IV INFUSION ADD-ON: CPT

## 2021-01-14 PROCEDURE — 85025 COMPLETE CBC W/AUTO DIFF WBC: CPT

## 2021-01-14 PROCEDURE — 96360 HYDRATION IV INFUSION INIT: CPT

## 2021-01-14 RX ORDER — SODIUM CHLORIDE 9 MG/ML
125 INJECTION, SOLUTION INTRAVENOUS CONTINUOUS
Status: DISCONTINUED | OUTPATIENT
Start: 2021-01-14 | End: 2021-01-14 | Stop reason: HOSPADM

## 2021-01-14 RX ADMIN — SODIUM CHLORIDE 1000 ML: 900 INJECTION, SOLUTION INTRAVENOUS at 10:45

## 2021-01-14 RX ADMIN — SODIUM CHLORIDE 125 ML/HR: 900 INJECTION, SOLUTION INTRAVENOUS at 12:41

## 2021-01-14 NOTE — ED TRIAGE NOTES
Patient states having prostate surgery on Tuesday. He states that he removed balloon today and began experiencing heavy bleeding from penis. Patient states losing a lot of blood. He c/o fatigue and being cold.

## 2021-01-14 NOTE — ED NOTES
I have reviewed discharge instructions with the patient. The patient verbalized understanding. Patient armband removed and shredded.  Pt instructed to go to urologists office

## 2021-01-14 NOTE — ED NOTES
Shortly after arriving, pt got up to use bedside commode and bled onto floor. Pt placed back on stretcher and   ABD dressing applied to penis. Bed padded. Dr. Sam Ceja aware.

## 2021-01-14 NOTE — ED PROVIDER NOTES
HPI patient says he had prostate surgery 2 days ago and had a catheter placed in his urethra afterwards. Patient remove the catheter today according to postoperative protocol. Patient says that after removing the catheter he has had continuous urethral bleeding. He contacted his urologist and they referred him to the emergency room for further evaluation. He denies any pain in his urethra or groin area he denies abdominal pain he denies any nausea or vomiting. No other complaints given at this time. Past Medical History:   Diagnosis Date    A-fib [facility])     BPH (benign prostatic hypertrophy) with urinary obstruction 2010    Crohn's disease ([facility] [address]     Crohn's disease of the colon 2011    Dyspnea     ED (erectile dysfunction) 2010    Hypertension     Knee pain, left        Past Surgical History:   Procedure Laterality Date    HX COLONOSCOPY      HX PROSTATE SURGERY  2021         No family history on file.     Social History     Socioeconomic History    Marital status:      Spouse name: Not on file    Number of children: Not on file    Years of education: Not on file    Highest education level: Not on file   Occupational History    Not on file   Social Needs    Financial resource strain: Not on file    Food insecurity     Worry: Not on file     Inability: Not on file    Transportation needs     Medical: Not on file     Non-medical: Not on file   Tobacco Use    Smoking status: Former Smoker     Quit date: 1990     Years since quittin.6    Smokeless tobacco: Never Used   Substance and Sexual Activity    Alcohol use: No    Drug use: No    Sexual activity: Yes     Partners: Female   Lifestyle    Physical activity     Days per week: Not on file     Minutes per session: Not on file    Stress: Not on file   Relationships    Social connections     Talks on phone: Not on file     Gets together: Not on file     Attends Bahai service: Not on file     Active member of club or organization: Not on file     Attends meetings of clubs or organizations: Not on file     Relationship status: Not on file    Intimate partner violence     Fear of current or ex partner: Not on file     Emotionally abused: Not on file     Physically abused: Not on file     Forced sexual activity: Not on file   Other Topics Concern    Not on file   Social History Narrative    Not on file         ALLERGIES: Iron    Review of Systems   Constitutional: Negative. HENT: Negative. Eyes: Negative. Respiratory: Negative. Cardiovascular: Negative. Gastrointestinal: Negative. Musculoskeletal: Negative. Skin: Negative. Neurological: Negative. Psychiatric/Behavioral: Negative. Vitals:    01/14/21 0922   BP: (!) 172/86   Pulse: 65   Resp: 24   Temp: 98.3 °F (36.8 °C)   SpO2: 99%   Weight: 103.4 kg (228 lb)   Height: 5' 11.5\" (1.816 m)            Physical Exam  Vitals signs and nursing note reviewed. Constitutional:       Appearance: He is well-developed. HENT:      Head: Normocephalic and atraumatic. Nose: Nose normal.   Eyes:      Pupils: Pupils are equal, round, and reactive to light. Neck:      Musculoskeletal: Neck supple. Cardiovascular:      Rate and Rhythm: Normal rate and regular rhythm. Pulmonary:      Effort: Pulmonary effort is normal.      Breath sounds: Normal breath sounds. Abdominal:      Palpations: Abdomen is soft. Genitourinary:     Comments: There is a fairly steady oozing of blood from the urethral meatus. No hernia. Penis and scrotal exam is otherwise normal  Musculoskeletal: Normal range of motion. Skin:     General: Skin is warm and dry. Neurological:      Mental Status: He is alert and oriented to person, place, and time.           MDM  Number of Diagnoses or Management Options  Diagnosis management comments: Patient is currently not having any significant bleeding as compared to his arrival.  There is some slight oozing from the urethra but no active bleeding. We have been treating with IV fluids and observation. I have been in discussion with his urologist office and I am waiting a callback from his urologist  (Dr Sandra Murguia). the patient has just informed me that he has been in discussion with the office and was told to leave the emergency room and come to the office where they would continue management. Patient is requesting discharge from the emergency department at this time. He says he will go directly to his urologist office. He agrees to return to the emergency room as needed and says he is satisfied with his care here today.   Chely Lucero MD 12:55 PM         Procedures

## 2021-01-27 ENCOUNTER — OFFICE VISIT (OUTPATIENT)
Dept: INTERNAL MEDICINE CLINIC | Age: 66
End: 2021-01-27
Payer: COMMERCIAL

## 2021-01-27 VITALS
WEIGHT: 222 LBS | HEART RATE: 65 BPM | BODY MASS INDEX: 30.07 KG/M2 | RESPIRATION RATE: 18 BRPM | TEMPERATURE: 97.2 F | SYSTOLIC BLOOD PRESSURE: 156 MMHG | DIASTOLIC BLOOD PRESSURE: 77 MMHG | HEIGHT: 72 IN | OXYGEN SATURATION: 97 %

## 2021-01-27 DIAGNOSIS — I10 ESSENTIAL HYPERTENSION: Primary | ICD-10-CM

## 2021-01-27 DIAGNOSIS — I48.11 LONGSTANDING PERSISTENT ATRIAL FIBRILLATION (HCC): ICD-10-CM

## 2021-01-27 DIAGNOSIS — R73.03 PREDIABETES: ICD-10-CM

## 2021-01-27 DIAGNOSIS — E55.9 VITAMIN D DEFICIENCY: ICD-10-CM

## 2021-01-27 DIAGNOSIS — K50.10 CROHN'S DISEASE OF COLON WITHOUT COMPLICATION (HCC): ICD-10-CM

## 2021-01-27 PROCEDURE — G8536 NO DOC ELDER MAL SCRN: HCPCS | Performed by: INTERNAL MEDICINE

## 2021-01-27 PROCEDURE — 1101F PT FALLS ASSESS-DOCD LE1/YR: CPT | Performed by: INTERNAL MEDICINE

## 2021-01-27 PROCEDURE — 99214 OFFICE O/P EST MOD 30 MIN: CPT | Performed by: INTERNAL MEDICINE

## 2021-01-27 PROCEDURE — G8427 DOCREV CUR MEDS BY ELIG CLIN: HCPCS | Performed by: INTERNAL MEDICINE

## 2021-01-27 PROCEDURE — G8510 SCR DEP NEG, NO PLAN REQD: HCPCS | Performed by: INTERNAL MEDICINE

## 2021-01-27 PROCEDURE — G8753 SYS BP > OR = 140: HCPCS | Performed by: INTERNAL MEDICINE

## 2021-01-27 PROCEDURE — 3017F COLORECTAL CA SCREEN DOC REV: CPT | Performed by: INTERNAL MEDICINE

## 2021-01-27 PROCEDURE — G8754 DIAS BP LESS 90: HCPCS | Performed by: INTERNAL MEDICINE

## 2021-01-27 PROCEDURE — G8417 CALC BMI ABV UP PARAM F/U: HCPCS | Performed by: INTERNAL MEDICINE

## 2021-01-27 NOTE — PATIENT INSTRUCTIONS
High Blood Pressure: Care Instructions Overview It's normal for blood pressure to go up and down throughout the day. But if it stays up, you have high blood pressure. Another name for high blood pressure is hypertension. Despite what a lot of people think, high blood pressure usually doesn't cause headaches or make you feel dizzy or lightheaded. It usually has no symptoms. But it does increase your risk of stroke, heart attack, and other problems. You and your doctor will talk about your risks of these problems based on your blood pressure. Your doctor will give you a goal for your blood pressure. Your goal will be based on your health and your age. Lifestyle changes, such as eating healthy and being active, are always important to help lower blood pressure. You might also take medicine to reach your blood pressure goal. 
Follow-up care is a key part of your treatment and safety. Be sure to make and go to all appointments, and call your doctor if you are having problems. It's also a good idea to know your test results and keep a list of the medicines you take. How can you care for yourself at home? Medical treatment · If you stop taking your medicine, your blood pressure will go back up. You may take one or more types of medicine to lower your blood pressure. Be safe with medicines. Take your medicine exactly as prescribed. Call your doctor if you think you are having a problem with your medicine. · Talk to your doctor before you start taking aspirin every day. Aspirin can help certain people lower their risk of a heart attack or stroke. But taking aspirin isn't right for everyone, because it can cause serious bleeding. · See your doctor regularly. You may need to see the doctor more often at first or until your blood pressure comes down. · If you are taking blood pressure medicine, talk to your doctor before you take decongestants or anti-inflammatory medicine, such as ibuprofen. Some of these medicines can raise blood pressure. · Learn how to check your blood pressure at home. Lifestyle changes · Stay at a healthy weight. This is especially important if you put on weight around the waist. Losing even 10 pounds can help you lower your blood pressure. · If your doctor recommends it, get more exercise. Walking is a good choice. Bit by bit, increase the amount you walk every day. Try for at least 30 minutes on most days of the week. You also may want to swim, bike, or do other activities. · Avoid or limit alcohol. Talk to your doctor about whether you can drink any alcohol. · Try to limit how much sodium you eat to less than 2,300 milligrams (mg) a day. Your doctor may ask you to try to eat less than 1,500 mg a day. · Eat plenty of fruits (such as bananas and oranges), vegetables, legumes, whole grains, and low-fat dairy products. · Lower the amount of saturated fat in your diet. Saturated fat is found in animal products such as milk, cheese, and meat. Limiting these foods may help you lose weight and also lower your risk for heart disease. · Do not smoke. Smoking increases your risk for heart attack and stroke. If you need help quitting, talk to your doctor about stop-smoking programs and medicines. These can increase your chances of quitting for good. When should you call for help? Call  911 anytime you think you may need emergency care. This may mean having symptoms that suggest that your blood pressure is causing a serious heart or blood vessel problem. Your blood pressure may be over 180/120. For example, call 911 if: 
  · You have symptoms of a heart attack. These may include: 
? Chest pain or pressure, or a strange feeling in the chest. 
? Sweating. ? Shortness of breath. ? Nausea or vomiting. ? Pain, pressure, or a strange feeling in the back, neck, jaw, or upper belly or in one or both shoulders or arms. ? Lightheadedness or sudden weakness. ? A fast or irregular heartbeat.  
  · You have symptoms of a stroke. These may include: 
? Sudden numbness, tingling, weakness, or loss of movement in your face, arm, or leg, especially on only one side of your body. ? Sudden vision changes. ? Sudden trouble speaking. ? Sudden confusion or trouble understanding simple statements. ? Sudden problems with walking or balance. ? A sudden, severe headache that is different from past headaches.  
  · You have severe back or belly pain. Do not wait until your blood pressure comes down on its own. Get help right away. Call your doctor now or seek immediate care if: 
  · Your blood pressure is much higher than normal (such as 180/120 or higher), but you don't have symptoms.  
  · You think high blood pressure is causing symptoms, such as: 
? Severe headache. 
? Blurry vision. Watch closely for changes in your health, and be sure to contact your doctor if: 
  · Your blood pressure measures higher than your doctor recommends at least 2 times. That means the top number is higher or the bottom number is higher, or both.  
  · You think you may be having side effects from your blood pressure medicine. Where can you learn more? Go to http://www.gray.com/ Enter J439 in the search box to learn more about \"High Blood Pressure: Care Instructions. \" Current as of: December 16, 2019               Content Version: 12.6 © 6097-4901 [employer]. Care instructions adapted under license by [employer] (which disclaims liability or warranty for this information). If you have questions about a medical condition or this instruction, always ask your healthcare professional. [address] any warranty or liability for your use of this information.

## 2021-01-27 NOTE — PROGRESS NOTES
Patient is in the office today for a 3 month follow up.      1. Have you been to the ER, urgent care clinic since your last visit?  Hospitalized since your last visit?yes, [facility].     2. Have you seen or consulted any other health care providers outside of the [facility] since your last visit?  Include any pap smears or colon screening. Yes, Dr. Les RUEDA.

## 2021-01-27 NOTE — PROGRESS NOTES
Epifanio Cook is a 72 y.o.  male and presents with Hypertension, Blood sugar problem (f/u), Irregular Heart Beat, Vitamin D Deficiency, and Obesity      SUBJECTIVE:  Pt's  BP is uncontrolled on Cardizem 240 mg in the office. His wrist BP monitor is much lower in the office and he will compare it with his wife's conventional blood pressure cuff at home to see if there is discrepancy. His blood pressure at home is in the 120s over 70s on the wrist monitor. He denies any fatigue. He is followed by Cardiology for his Afib, He will continue to monitor his BP at home and try to lose 20 lbs. Pt's BPH and LUTS are fairly well controlled on Uroxatral. Pt is followed by urology. He had a negative biopsy for prostate cancer. Pt is followed by GI for his Crohn's disease which is fairly stable on mesalamine. Pt continues to try and cut back the sweats and carbs in his diet to improve his prediabetes. .  Allergies are well controlled on Claritin-D and Flonase and Singulair. Pt hasVit D deficiency and will  take vit D 4000 units/day to get levels optimally controlled. Respiratory ROS: negative for - shortness of breath  Cardiovascular ROS: negative for - chest pain    Current Outpatient Medications   Medication Sig    dilTIAZem CD (CARDIZEM CD) 240 mg ER capsule Take 1 Cap by mouth daily.  flecainide (TAMBOCOR) 50 mg tablet 50 mg two (2) times a day.  alfuzosin SR (UROXATRAL) 10 mg SR tablet Take 1 Tab by mouth daily (after dinner). No current facility-administered medications for this visit.           OBJECTIVE:  alert, well appearing, and in no distress  Visit Vitals  BP (!) 156/77 (BP 1 Location: Left arm, BP Patient Position: Sitting)   Pulse 65   Temp 97.2 °F (36.2 °C) (Temporal)   Resp 18   Ht 5' 11.5\" (1.816 m)   Wt 222 lb (100.7 kg)   SpO2 97%   BMI 30.53 kg/m²      well developed and well nourished  Chest - clear to auscultation, no wheezes, rales or rhonchi, symmetric air entry  Heart - normal rate, regular rhythm, normal S1, S2, no murmurs, rubs, clicks or gallops  Extremities - peripheral pulses normal, no pedal edema, no clubbing or cyanosis    Labs:   Lab Results   Component Value Date/Time    Cholesterol, total 158 09/16/2020 08:58 AM    HDL Cholesterol 41 09/16/2020 08:58 AM    LDL, calculated 92.6 09/16/2020 08:58 AM    Triglyceride 122 09/16/2020 08:58 AM    CHOL/HDL Ratio 3.9 09/16/2020 08:58 AM     Lab Results   Component Value Date/Time    ALT (SGPT) 40 09/16/2020 08:58 AM    Alk. phosphatase 87 09/16/2020 08:58 AM    Bilirubin, total 0.2 09/16/2020 08:58 AM     Lab Results   Component Value Date/Time    GFR est AA >60 01/14/2021 10:15 AM    GFR est non-AA >60 01/14/2021 10:15 AM    Creatinine 1.10 01/14/2021 10:15 AM    BUN 13 01/14/2021 10:15 AM    Sodium 139 01/14/2021 10:15 AM    Potassium 3.3 (L) 01/14/2021 10:15 AM    Chloride 105 01/14/2021 10:15 AM    CO2 32 01/14/2021 10:15 AM      Lab Results   Component Value Date/Time    Prostate Specific Ag 4.58 (H) 09/03/2020 09:32 AM    Prostate Specific Ag 5.02 (H) 07/11/2019 10:50 AM    Prostate Specific Ag 5.0 (H) 04/09/2019 09:41 AM    Prostate Specific Ag 3.6 04/09/2018 08:58 AM    Prostate Specific Ag 2.5 02/16/2017 09:14 AM     Lab Results   Component Value Date/Time    Glucose 100 (H) 01/14/2021 10:15 AM      Labs:   Lab Results   Component Value Date/Time    Hemoglobin A1c 6.1 (H) 09/16/2020 08:58 AM    Hemoglobin A1c 6.2 (H) 11/26/2019 09:50 AM    Hemoglobin A1c 6.2 (H) 01/29/2019 09:08 AM    Glucose 100 (H) 01/14/2021 10:15 AM    LDL, calculated 92.6 09/16/2020 08:58 AM    Creatinine 1.10 01/14/2021 10:15 AM          Assessment/Plan      ICD-10-CM ICD-9-CM    1. Essential hypertension  I10 401.9  uncontrolled on Cardizem in the office. Patient will monitor at home with his wife's conventional blood pressure cuff LIPID PANEL      METABOLIC PANEL, COMPREHENSIVE   2.  Prediabetes  R73.03 790.29  patient trying to control with diet and weight loss HEMOGLOBIN A1C W/O EAG   3. Vitamin D deficiency  E55.9 268.9  well-controlled on vitamin D 4000's per day VITAMIN D, 25 HYDROXY   4. Longstanding persistent atrial fibrillation (HCC)  I48.11 427.31  stable on flecainide and Cardizem. Patient followed by cardiology   5. Crohn's disease of colon without complication ([facility] [address]  I94.47 555.1  stable on mesalamine and followed by GI       Follow-up and Dispositions    · Return in about 3 months (around 4/27/2021) for labs 1 week before. Reviewed plan of care. Patient has provided input and agrees with goals.

## 2021-04-26 ENCOUNTER — HOSPITAL ENCOUNTER (OUTPATIENT)
Dept: LAB | Age: 66
Discharge: HOME | End: 2021-04-26
Payer: COMMERCIAL

## 2021-04-26 ENCOUNTER — APPOINTMENT (OUTPATIENT)
Dept: INTERNAL MEDICINE CLINIC | Age: 66
End: 2021-04-26

## 2021-04-26 DIAGNOSIS — I10 ESSENTIAL HYPERTENSION: ICD-10-CM

## 2021-04-26 DIAGNOSIS — R73.03 PREDIABETES: ICD-10-CM

## 2021-04-26 DIAGNOSIS — E55.9 VITAMIN D DEFICIENCY: ICD-10-CM

## 2021-04-26 LAB
25(OH)D3 SERPL-MCNC: 33.5 NG/ML (ref 30–100)
ALBUMIN SERPL-MCNC: 3.4 G/DL (ref 3.4–5)
ALBUMIN/GLOB SERPL: 0.9 {RATIO} (ref 0.8–1.7)
ALP SERPL-CCNC: 119 U/L (ref 45–117)
ALT SERPL-CCNC: 42 U/L (ref 16–61)
ANION GAP SERPL CALC-SCNC: 4 MMOL/L (ref 3–18)
AST SERPL-CCNC: 23 U/L (ref 10–38)
BILIRUB SERPL-MCNC: 0.1 MG/DL (ref 0.2–1)
BUN SERPL-MCNC: 12 MG/DL (ref 7–18)
BUN/CREAT SERPL: 12 (ref 12–20)
CALCIUM SERPL-MCNC: 9.7 MG/DL (ref 8.5–10.1)
CHLORIDE SERPL-SCNC: 109 MMOL/L (ref 100–111)
CHOLEST SERPL-MCNC: 128 MG/DL
CO2 SERPL-SCNC: 27 MMOL/L (ref 21–32)
CREAT SERPL-MCNC: 0.98 MG/DL (ref 0.6–1.3)
GLOBULIN SER CALC-MCNC: 3.6 G/DL (ref 2–4)
GLUCOSE SERPL-MCNC: 94 MG/DL (ref 74–99)
HBA1C MFR BLD: 5.7 % (ref 4.2–5.6)
HDLC SERPL-MCNC: 40 MG/DL (ref 40–60)
HDLC SERPL: 3.2 {RATIO} (ref 0–5)
LDLC SERPL CALC-MCNC: 73.4 MG/DL (ref 0–100)
LIPID PROFILE,FLP: NORMAL
POTASSIUM SERPL-SCNC: 4 MMOL/L (ref 3.5–5.5)
PROT SERPL-MCNC: 7 G/DL (ref 6.4–8.2)
SODIUM SERPL-SCNC: 140 MMOL/L (ref 136–145)
TRIGL SERPL-MCNC: 73 MG/DL (ref ?–150)
VLDLC SERPL CALC-MCNC: 14.6 MG/DL

## 2021-04-26 PROCEDURE — 36415 COLL VENOUS BLD VENIPUNCTURE: CPT

## 2021-04-26 PROCEDURE — 80053 COMPREHEN METABOLIC PANEL: CPT

## 2021-04-26 PROCEDURE — 82306 VITAMIN D 25 HYDROXY: CPT

## 2021-04-26 PROCEDURE — 83036 HEMOGLOBIN GLYCOSYLATED A1C: CPT

## 2021-04-26 PROCEDURE — 80061 LIPID PANEL: CPT

## 2021-05-04 ENCOUNTER — OFFICE VISIT (OUTPATIENT)
Dept: INTERNAL MEDICINE CLINIC | Age: 66
End: 2021-05-04
Payer: COMMERCIAL

## 2021-05-04 VITALS
HEART RATE: 66 BPM | HEIGHT: 72 IN | TEMPERATURE: 97.8 F | OXYGEN SATURATION: 99 % | WEIGHT: 216 LBS | DIASTOLIC BLOOD PRESSURE: 79 MMHG | RESPIRATION RATE: 16 BRPM | BODY MASS INDEX: 29.26 KG/M2 | SYSTOLIC BLOOD PRESSURE: 153 MMHG

## 2021-05-04 DIAGNOSIS — K50.10 CROHN'S DISEASE OF COLON WITHOUT COMPLICATION (HCC): ICD-10-CM

## 2021-05-04 DIAGNOSIS — R73.03 PREDIABETES: ICD-10-CM

## 2021-05-04 DIAGNOSIS — E55.9 VITAMIN D DEFICIENCY: ICD-10-CM

## 2021-05-04 DIAGNOSIS — I10 ESSENTIAL HYPERTENSION: Primary | ICD-10-CM

## 2021-05-04 DIAGNOSIS — I48.11 LONGSTANDING PERSISTENT ATRIAL FIBRILLATION (HCC): ICD-10-CM

## 2021-05-04 PROCEDURE — G8536 NO DOC ELDER MAL SCRN: HCPCS | Performed by: INTERNAL MEDICINE

## 2021-05-04 PROCEDURE — G8753 SYS BP > OR = 140: HCPCS | Performed by: INTERNAL MEDICINE

## 2021-05-04 PROCEDURE — G8417 CALC BMI ABV UP PARAM F/U: HCPCS | Performed by: INTERNAL MEDICINE

## 2021-05-04 PROCEDURE — G8427 DOCREV CUR MEDS BY ELIG CLIN: HCPCS | Performed by: INTERNAL MEDICINE

## 2021-05-04 PROCEDURE — 99214 OFFICE O/P EST MOD 30 MIN: CPT | Performed by: INTERNAL MEDICINE

## 2021-05-04 PROCEDURE — 3017F COLORECTAL CA SCREEN DOC REV: CPT | Performed by: INTERNAL MEDICINE

## 2021-05-04 PROCEDURE — G8754 DIAS BP LESS 90: HCPCS | Performed by: INTERNAL MEDICINE

## 2021-05-04 PROCEDURE — 1101F PT FALLS ASSESS-DOCD LE1/YR: CPT | Performed by: INTERNAL MEDICINE

## 2021-05-04 PROCEDURE — G8432 DEP SCR NOT DOC, RNG: HCPCS | Performed by: INTERNAL MEDICINE

## 2021-05-04 NOTE — PROGRESS NOTES
Aiden Nataliia presents today for   Chief Complaint   Patient presents with    Follow-up    Hypertension    Labs     4-26-21                Coordination of Care:  1. Have you been to the ER, urgent care clinic since your last visit? Hospitalized since your last visit? no    2. Have you seen or consulted any other health care providers outside of the [address] [place] [address] since your last visit? Include any pap smears or colon screening.  yes

## 2021-05-04 NOTE — PROGRESS NOTES
Sheree Cabrera is a 72 y.o.  male and presents with Follow-up, Hypertension, Labs (4-26-21), Gout, Irregular Heart Beat, and Blood sugar problem      SUBJECTIVE:  Pt's HTN is uncontrolled on Cardizem 240 mg in the office. His blood pressure at home is in the 120s over 70s. In general with his weight loss blood pressure is better in the office than it was at last office visit. He has not done well with tolerating other blood pressure medicines so we will try it improve blood pressure further with more weight loss. Sue Karan He denies any fatigue. He is followed by Cardiology for his Afib, He will continue to monitor his BP at home and try to lose 10 lbs. Pt's BPH and LUTS are controlled off medications currently. Pt is followed by urology. He had a negative biopsy for prostate cancer. Pt is followed by GI for his Crohn's disease which is fairly stable off mesalamine. Pt continues to try and cut back the sweats and carbs in his diet to improve his prediabetes. .  Allergies are well controlled on Claritin-D and Flonase and Singulair. Pt hasVit D deficiency and will  take vit D 4000 units/day to get levels optimally controlled. Respiratory ROS: negative for - shortness of breath  Cardiovascular ROS: negative for - chest pain    Current Outpatient Medications   Medication Sig    dilTIAZem CD (CARDIZEM CD) 240 mg ER capsule Take 1 Cap by mouth daily.  flecainide (TAMBOCOR) 50 mg tablet 50 mg two (2) times a day. No current facility-administered medications for this visit.           OBJECTIVE:  alert, well appearing, and in no distress  Visit Vitals  BP (!) 153/79   Pulse 66   Temp 97.8 °F (36.6 °C) (Temporal)   Resp 16   Ht 5' 11.5\" (1.816 m)   Wt 216 lb (98 kg)   SpO2 99%   BMI 29.71 kg/m²      well developed and well nourished  Chest - clear to auscultation, no wheezes, rales or rhonchi, symmetric air entry  Heart - normal rate, regular rhythm, normal S1, S2, no murmurs, rubs, clicks or gallops  Extremities - peripheral pulses normal, no pedal edema, no clubbing or cyanosis    Labs:   Lab Results   Component Value Date/Time    Cholesterol, total 128 04/26/2021 08:33 AM    HDL Cholesterol 40 04/26/2021 08:33 AM    LDL, calculated 73.4 04/26/2021 08:33 AM    Triglyceride 73 04/26/2021 08:33 AM    CHOL/HDL Ratio 3.2 04/26/2021 08:33 AM     Lab Results   Component Value Date/Time    ALT (SGPT) 42 04/26/2021 08:33 AM    Alk. phosphatase 119 (H) 04/26/2021 08:33 AM    Bilirubin, total 0.1 (L) 04/26/2021 08:33 AM     Lab Results   Component Value Date/Time    GFR est AA >60 04/26/2021 08:33 AM    GFR est non-AA >60 04/26/2021 08:33 AM    Creatinine 0.98 04/26/2021 08:33 AM    BUN 12 04/26/2021 08:33 AM    Sodium 140 04/26/2021 08:33 AM    Potassium 4.0 04/26/2021 08:33 AM    Chloride 109 04/26/2021 08:33 AM    CO2 27 04/26/2021 08:33 AM      Lab Results   Component Value Date/Time    Prostate Specific Ag 4.58 (H) 09/03/2020 09:32 AM    Prostate Specific Ag 5.02 (H) 07/11/2019 10:50 AM    Prostate Specific Ag 5.0 (H) 04/09/2019 09:41 AM    Prostate Specific Ag 3.6 04/09/2018 08:58 AM    Prostate Specific Ag 2.5 02/16/2017 09:14 AM     Lab Results   Component Value Date/Time    Glucose 94 04/26/2021 08:33 AM      Labs:   Lab Results   Component Value Date/Time    Hemoglobin A1c 5.7 (H) 04/26/2021 08:33 AM    Hemoglobin A1c 6.1 (H) 09/16/2020 08:58 AM    Hemoglobin A1c 6.2 (H) 11/26/2019 09:50 AM    Glucose 94 04/26/2021 08:33 AM    LDL, calculated 73.4 04/26/2021 08:33 AM    Creatinine 0.98 04/26/2021 08:33 AM          Assessment/Plan      ICD-10-CM ICD-9-CM    1. Essential hypertension  I10 401.9  borderline controlled Cardizem  mg. Patient will try improve further with weight loss since he does not tolerate other blood pressure medicines well or if an increase in the dose of Cardizem. METABOLIC PANEL, COMPREHENSIVE      LIPID PANEL   2.  Prediabetes  R73.03 790.29  improving with diet and weight loss HEMOGLOBIN A1C W/O EAG   3. Vitamin D deficiency  E55.9 268.9  controlled on current supplementation VITAMIN D, 25 HYDROXY   4. Longstanding persistent atrial fibrillation (HCC)  I48.11 427.31  patient stable on flecainide and followed by cardiology   5. [facility]  I20.26 555.1  patient managed by GI       Follow-up and Dispositions    · Return in about 4 months (around 9/4/2021) for labs 1 week before. Reviewed plan of care. Patient has provided input and agrees with goals.

## 2021-08-26 LAB — COLONOSCOPY, EXTERNAL: NORMAL

## 2021-09-07 ENCOUNTER — APPOINTMENT (OUTPATIENT)
Dept: INTERNAL MEDICINE CLINIC | Age: 66
End: 2021-09-07

## 2021-09-07 ENCOUNTER — HOSPITAL ENCOUNTER (OUTPATIENT)
Dept: LAB | Age: 66
Discharge: HOME | End: 2021-09-07
Payer: COMMERCIAL

## 2021-09-07 DIAGNOSIS — R73.03 PREDIABETES: ICD-10-CM

## 2021-09-07 DIAGNOSIS — I10 ESSENTIAL HYPERTENSION: ICD-10-CM

## 2021-09-07 DIAGNOSIS — E55.9 VITAMIN D DEFICIENCY: ICD-10-CM

## 2021-09-07 LAB
25(OH)D3 SERPL-MCNC: 23.6 NG/ML (ref 30–100)
ALBUMIN SERPL-MCNC: 3.4 G/DL (ref 3.4–5)
ALBUMIN/GLOB SERPL: 0.9 {RATIO} (ref 0.8–1.7)
ALP SERPL-CCNC: 98 U/L (ref 45–117)
ALT SERPL-CCNC: 35 U/L (ref 16–61)
ANION GAP SERPL CALC-SCNC: 2 MMOL/L (ref 3–18)
AST SERPL-CCNC: 19 U/L (ref 10–38)
BILIRUB SERPL-MCNC: 0.2 MG/DL (ref 0.2–1)
BUN SERPL-MCNC: 12 MG/DL (ref 7–18)
BUN/CREAT SERPL: 11 (ref 12–20)
CALCIUM SERPL-MCNC: 9.6 MG/DL (ref 8.5–10.1)
CHLORIDE SERPL-SCNC: 108 MMOL/L (ref 100–111)
CHOLEST SERPL-MCNC: 171 MG/DL
CO2 SERPL-SCNC: 29 MMOL/L (ref 21–32)
CREAT SERPL-MCNC: 1.06 MG/DL (ref 0.6–1.3)
GLOBULIN SER CALC-MCNC: 3.7 G/DL (ref 2–4)
GLUCOSE SERPL-MCNC: 86 MG/DL (ref 74–99)
HBA1C MFR BLD: 6.1 % (ref 4.2–5.6)
HDLC SERPL-MCNC: 36 MG/DL (ref 40–60)
HDLC SERPL: 4.8 {RATIO} (ref 0–5)
LDLC SERPL CALC-MCNC: 96.2 MG/DL (ref 0–100)
LIPID PROFILE,FLP: ABNORMAL
POTASSIUM SERPL-SCNC: 4.8 MMOL/L (ref 3.5–5.5)
PROT SERPL-MCNC: 7.1 G/DL (ref 6.4–8.2)
SODIUM SERPL-SCNC: 139 MMOL/L (ref 136–145)
TRIGL SERPL-MCNC: 194 MG/DL (ref ?–150)
VLDLC SERPL CALC-MCNC: 38.8 MG/DL

## 2021-09-07 PROCEDURE — 82306 VITAMIN D 25 HYDROXY: CPT

## 2021-09-07 PROCEDURE — 36415 COLL VENOUS BLD VENIPUNCTURE: CPT

## 2021-09-07 PROCEDURE — 83036 HEMOGLOBIN GLYCOSYLATED A1C: CPT

## 2021-09-07 PROCEDURE — 80053 COMPREHEN METABOLIC PANEL: CPT

## 2021-09-07 PROCEDURE — 80061 LIPID PANEL: CPT

## 2021-09-14 ENCOUNTER — OFFICE VISIT (OUTPATIENT)
Dept: INTERNAL MEDICINE CLINIC | Age: 66
End: 2021-09-14
Payer: COMMERCIAL

## 2021-09-14 VITALS
BODY MASS INDEX: 30.2 KG/M2 | DIASTOLIC BLOOD PRESSURE: 88 MMHG | OXYGEN SATURATION: 99 % | SYSTOLIC BLOOD PRESSURE: 167 MMHG | TEMPERATURE: 97.3 F | RESPIRATION RATE: 18 BRPM | HEART RATE: 56 BPM | WEIGHT: 223 LBS | HEIGHT: 72 IN

## 2021-09-14 DIAGNOSIS — K50.10 CROHN'S DISEASE OF COLON WITHOUT COMPLICATION (HCC): ICD-10-CM

## 2021-09-14 DIAGNOSIS — E55.9 VITAMIN D DEFICIENCY: ICD-10-CM

## 2021-09-14 DIAGNOSIS — I10 ESSENTIAL HYPERTENSION: Primary | ICD-10-CM

## 2021-09-14 DIAGNOSIS — I48.11 LONGSTANDING PERSISTENT ATRIAL FIBRILLATION (HCC): ICD-10-CM

## 2021-09-14 DIAGNOSIS — Z23 NEEDS FLU SHOT: ICD-10-CM

## 2021-09-14 DIAGNOSIS — R73.03 PREDIABETES: ICD-10-CM

## 2021-09-14 PROCEDURE — 90471 IMMUNIZATION ADMIN: CPT | Performed by: INTERNAL MEDICINE

## 2021-09-14 PROCEDURE — 1101F PT FALLS ASSESS-DOCD LE1/YR: CPT | Performed by: INTERNAL MEDICINE

## 2021-09-14 PROCEDURE — 90694 VACC AIIV4 NO PRSRV 0.5ML IM: CPT | Performed by: INTERNAL MEDICINE

## 2021-09-14 PROCEDURE — G8417 CALC BMI ABV UP PARAM F/U: HCPCS | Performed by: INTERNAL MEDICINE

## 2021-09-14 PROCEDURE — 3017F COLORECTAL CA SCREEN DOC REV: CPT | Performed by: INTERNAL MEDICINE

## 2021-09-14 PROCEDURE — G8510 SCR DEP NEG, NO PLAN REQD: HCPCS | Performed by: INTERNAL MEDICINE

## 2021-09-14 PROCEDURE — G8427 DOCREV CUR MEDS BY ELIG CLIN: HCPCS | Performed by: INTERNAL MEDICINE

## 2021-09-14 PROCEDURE — G8536 NO DOC ELDER MAL SCRN: HCPCS | Performed by: INTERNAL MEDICINE

## 2021-09-14 PROCEDURE — 99214 OFFICE O/P EST MOD 30 MIN: CPT | Performed by: INTERNAL MEDICINE

## 2021-09-14 PROCEDURE — G8753 SYS BP > OR = 140: HCPCS | Performed by: INTERNAL MEDICINE

## 2021-09-14 PROCEDURE — G8754 DIAS BP LESS 90: HCPCS | Performed by: INTERNAL MEDICINE

## 2021-09-14 NOTE — PROGRESS NOTES
Melita Simental is a 72 y.o.  male and presents with Hypertension, Blood sugar problem, Vitamin D Deficiency, Immunization/Injection (flu vaccine), Irregular Heart Beat, and Inflammatory Bowel Disease      SUBJECTIVE:  Pt's HTN is uncontrolled on Cardizem 240 mg in the office. Pt has gained weight which is a factor. He has not done well with tolerating other blood pressure medicines so we will try it improve blood pressure further with  weight loss. Matt Billwer He denies any fatigue. He is followed by Cardiology for his Afib, He will continue to monitor his BP at home and try to lose 10-20 lbs. Pt's BPH and LUTS are controlled off medications currently. Pt is followed by urology. He had a negative biopsy for prostate cancer. Pt is followed by GI for his Crohn's disease which is fairly stable off mesalamine. Pt continues to try and cut back the sweats and carbs in his diet to improve his prediabetes. .  Allergies are well controlled on Claritin-D and Flonase and Singulair. Pt hasVit D deficiency that is uncontrolled off supplementation. Respiratory ROS: negative for - shortness of breath  Cardiovascular ROS: negative for - chest pain    Current Outpatient Medications   Medication Sig    aspirin 81 mg chewable tablet Take 81 mg by mouth daily.  fluticasone propionate (Flonase Allergy Relief) 50 mcg/actuation nasal spray 2 Sprays by Both Nostrils route daily.  tadalafiL (CIALIS) 20 mg tablet Take 1 Tablet by mouth daily as needed for Erectile Dysfunction.  dilTIAZem CD (CARDIZEM CD) 240 mg ER capsule Take 1 Cap by mouth daily.  flecainide (TAMBOCOR) 50 mg tablet 50 mg two (2) times a day. No current facility-administered medications for this visit.          OBJECTIVE:  alert, well appearing, and in no distress  Visit Vitals  BP (!) 167/88 (BP 1 Location: Left arm, BP Patient Position: Sitting, BP Cuff Size: Adult)   Pulse (!) 56   Temp 97.3 °F (36.3 °C) (Temporal)   Resp 18   Ht 6' (1.829 m) Wt 223 lb (101.2 kg)   SpO2 99%   BMI 30.24 kg/m²      well developed and well nourished  Chest - clear to auscultation, no wheezes, rales or rhonchi, symmetric air entry  Heart - normal rate, regular rhythm, normal S1, S2, no murmurs, rubs, clicks or gallops  Extremities - peripheral pulses normal, no pedal edema, no clubbing or cyanosis    Labs:   Lab Results   Component Value Date/Time    Cholesterol, total 171 09/07/2021 10:07 AM    HDL Cholesterol 36 (L) 09/07/2021 10:07 AM    LDL, calculated 96.2 09/07/2021 10:07 AM    Triglyceride 194 (H) 09/07/2021 10:07 AM    CHOL/HDL Ratio 4.8 09/07/2021 10:07 AM     Lab Results   Component Value Date/Time    ALT (SGPT) 35 09/07/2021 10:07 AM    Alk. phosphatase 98 09/07/2021 10:07 AM    Bilirubin, total 0.2 09/07/2021 10:07 AM     Lab Results   Component Value Date/Time    GFR est AA >60 09/07/2021 10:07 AM    GFR est non-AA >60 09/07/2021 10:07 AM    Creatinine 1.06 09/07/2021 10:07 AM    BUN 12 09/07/2021 10:07 AM    Sodium 139 09/07/2021 10:07 AM    Potassium 4.8 09/07/2021 10:07 AM    Chloride 108 09/07/2021 10:07 AM    CO2 29 09/07/2021 10:07 AM      Lab Results   Component Value Date/Time    Prostate Specific Ag 0.85 09/16/2021 09:09 AM    Prostate Specific Ag 4.58 (H) 09/03/2020 09:32 AM    Prostate Specific Ag 5.02 (H) 07/11/2019 10:50 AM    Prostate Specific Ag 5.0 (H) 04/09/2019 09:41 AM    Prostate Specific Ag 3.6 04/09/2018 08:58 AM    Prostate Specific Ag 2.5 02/16/2017 09:14 AM     Lab Results   Component Value Date/Time    Glucose 86 09/07/2021 10:07 AM      Labs:   Lab Results   Component Value Date/Time    Hemoglobin A1c 6.1 (H) 09/07/2021 10:07 AM    Hemoglobin A1c 5.7 (H) 04/26/2021 08:33 AM    Hemoglobin A1c 6.1 (H) 09/16/2020 08:58 AM    Glucose 86 09/07/2021 10:07 AM    LDL, calculated 96.2 09/07/2021 10:07 AM    Creatinine 1.06 09/07/2021 10:07 AM          Assessment/Plan    ICD-10-CM ICD-9-CM    1.  Essential hypertension  I10 401.9 Uncontrolled on Cardizem  mg. Will try to improve further with weight loss. METABOLIC PANEL, COMPREHENSIVE      LIPID PANEL   2. Prediabetes  R73.03 790.29 Borderline controlled. will try to improve further with weight loss. HEMOGLOBIN A1C W/O EAG   3. Vitamin D deficiency  E55.9 268.9 Uncontrolled. Will restart vit D 2000 units/day VITAMIN D, 25 HYDROXY   4. Longstanding persistent atrial fibrillation (HCC)  I48.11 427.31 Pt stable on flecainide and followed by cardiology   5. Crohn's disease of colon without complication ([facility] [address]  F89.13 555.1  stable on mesalamine and followed by gastroenterology   6. Needs flu shot  Z23 V04.81 FLU (FLUAD QUAD INFLUENZA VACCINE,QUAD,ADJUVANTED)         Follow-up and Dispositions    · Return in about 3 months (around 12/14/2021) for labs 1 week before. Reviewed plan of care. Patient has provided input and agrees with goals.

## 2021-09-14 NOTE — PATIENT INSTRUCTIONS
Influenza (Flu) Vaccine (Inactivated or Recombinant): What You Need to Know  Why get vaccinated? Influenza vaccine can prevent influenza (flu). Flu is a contagious disease that spreads around the [place] every year, usually between October and May. Anyone can get the flu, but it is more dangerous for some people. Infants and young children, people 72years of age and older, pregnant women, and people with certain health conditions or a weakened immune system are at greatest risk of flu complications. Pneumonia, bronchitis, sinus infections and ear infections are examples of flu-related complications. If you have a medical condition, such as heart disease, cancer or diabetes, flu can make it worse. Flu can cause fever and chills, sore throat, muscle aches, fatigue, cough, headache, and runny or stuffy nose. Some people may have vomiting and diarrhea, though this is more common in children than adults. Each year, thousands of people in the [facility] die from flu, and many more are hospitalized. Flu vaccine prevents millions of illnesses and flu-related visits to the doctor each year. Influenza vaccine  CDC recommends everyone 10months of age and older get vaccinated every flu season. Children 6 months through 6years of age may need 2 doses during a single flu season. Everyone else needs only 1 dose each flu season. It takes about 2 weeks for protection to develop after vaccination. There are many flu viruses, and they are always changing. Each year a new flu vaccine is made to protect against three or four viruses that are likely to cause disease in the upcoming flu season. Even when the vaccine doesn't exactly match these viruses, it may still provide some protection. Influenza vaccine does not cause flu. Influenza vaccine may be given at the same time as other vaccines.   Talk with your health care provider  Tell your vaccine provider if the person getting the vaccine:  · Has had an allergic reaction after a previous dose of influenza vaccine, or has any severe, life-threatening allergies. · Has ever had Guillain-Barré Syndrome (also called GBS). In some cases, your health care provider may decide to postpone influenza vaccination to a future visit. People with minor illnesses, such as a cold, may be vaccinated. People who are moderately or severely ill should usually wait until they recover before getting influenza vaccine. Your health care provider can give you more information. Risks of a vaccine reaction  · Soreness, redness, and swelling where shot is given, fever, muscle aches, and headache can happen after influenza vaccine. · There may be a very small increased risk of Guillain-Barré Syndrome (GBS) after inactivated influenza vaccine (the flu shot). Delene Maria Luisa children who get the flu shot along with pneumococcal vaccine (PCV13), and/or DTaP vaccine at the same time might be slightly more likely to have a seizure caused by fever. Tell your health care provider if a child who is getting flu vaccine has ever had a seizure. People sometimes faint after medical procedures, including vaccination. Tell your provider if you feel dizzy or have vision changes or ringing in the ears. As with any medicine, there is a very remote chance of a vaccine causing a severe allergic reaction, other serious injury, or death. What if there is a serious problem? An allergic reaction could occur after the vaccinated person leaves the clinic. If you see signs of a severe allergic reaction (hives, swelling of the face and throat, difficulty breathing, a fast heartbeat, dizziness, or weakness), call 9-1-1 and get the person to the nearest hospital.  For other signs that concern you, call your health care provider. Adverse reactions should be reported to the Vaccine Adverse Event Reporting System (VAERS). Your health care provider will usually file this report, or you can do it yourself.  Visit the VAERS website at www.vaers. [facility].gov or call 8-405.457.7546. VAERS is only for reporting reactions, and VAERS staff do not give medical advice. The National Vaccine Injury Compensation Program  The National Vaccine Injury Compensation Program (VICP) is a federal program that was created to compensate people who may have been injured by certain vaccines. Visit the VICP website at www.[facility].gov/vaccinecompensation or call 8-290.896.1137 to learn about the program and about filing a claim. There is a time limit to file a claim for compensation. How can I learn more? · Ask your healthcare provider. · Call your local or state health department. · Contact the Centers for Disease Control and Prevention (CDC):  ? Call 3-852.371.4044 (1-800-CDC-INFO) or  ? Visit CDC's website at www.cdc.gov/flu  Vaccine Information Statement (Interim)  Inactivated Influenza Vaccine  8/15/2019  [address] [place] 777HS-13  Department of Health and Human Services  Centers for Disease Control and Prevention  Many Vaccine Information Statements are available in Indian and other languages. See www.immunize.org/vis. Muchas hojas de información sobre vacunas están disponibles en español y en otros idiomas. Visite www.immunize.org/vis. Care instructions adapted under license by [employer] (which disclaims liability or warranty for this information). If you have questions about a medical condition or this instruction, always ask your healthcare professional. [place] [address] any warranty or liability for your use of this information.

## 2021-09-14 NOTE — PROGRESS NOTES
Patient is in the office today for a 4 month follow up. Do you have an Advance Directive no  Do you want more information : information given     1. Have you been to the ER, urgent care clinic since your last visit? Hospitalized since your last visit? No    2. Have you seen or consulted any other health care providers outside of the [address] [place] [address] since your last visit? Include any pap smears or colon screening. No      Verbal order read back per Dr. Malka Cook. Patient received flu vaccine in left deltoid. Patient was observed and no sign or symptoms of an allergic reaction noted at this time. Patient tolerated well and left without complaints. Patient received flu VIS.

## 2021-12-07 ENCOUNTER — HOSPITAL ENCOUNTER (OUTPATIENT)
Dept: LAB | Age: 66
Discharge: HOME | End: 2021-12-07
Payer: COMMERCIAL

## 2021-12-07 ENCOUNTER — APPOINTMENT (OUTPATIENT)
Dept: INTERNAL MEDICINE CLINIC | Age: 66
End: 2021-12-07

## 2021-12-07 DIAGNOSIS — I10 ESSENTIAL HYPERTENSION: ICD-10-CM

## 2021-12-07 DIAGNOSIS — E55.9 VITAMIN D DEFICIENCY: ICD-10-CM

## 2021-12-07 DIAGNOSIS — R73.03 PREDIABETES: ICD-10-CM

## 2021-12-07 LAB
25(OH)D3 SERPL-MCNC: 24.5 NG/ML (ref 30–100)
ALBUMIN SERPL-MCNC: 3.4 G/DL (ref 3.4–5)
ALBUMIN/GLOB SERPL: 0.9 {RATIO} (ref 0.8–1.7)
ALP SERPL-CCNC: 92 U/L (ref 45–117)
ALT SERPL-CCNC: 33 U/L (ref 16–61)
ANION GAP SERPL CALC-SCNC: 2 MMOL/L (ref 3–18)
AST SERPL-CCNC: 21 U/L (ref 10–38)
BILIRUB SERPL-MCNC: 0.2 MG/DL (ref 0.2–1)
BUN SERPL-MCNC: 14 MG/DL (ref 7–18)
BUN/CREAT SERPL: 14 (ref 12–20)
CALCIUM SERPL-MCNC: 9.6 MG/DL (ref 8.5–10.1)
CHLORIDE SERPL-SCNC: 107 MMOL/L (ref 100–111)
CHOLEST SERPL-MCNC: 137 MG/DL
CO2 SERPL-SCNC: 32 MMOL/L (ref 21–32)
CREAT SERPL-MCNC: 0.99 MG/DL (ref 0.6–1.3)
GLOBULIN SER CALC-MCNC: 3.7 G/DL (ref 2–4)
GLUCOSE SERPL-MCNC: 72 MG/DL (ref 74–99)
HBA1C MFR BLD: 5.9 % (ref 4.2–5.6)
HDLC SERPL-MCNC: 43 MG/DL (ref 40–60)
HDLC SERPL: 3.2 {RATIO} (ref 0–5)
LDLC SERPL CALC-MCNC: 68.6 MG/DL (ref 0–100)
LIPID PROFILE,FLP: NORMAL
POTASSIUM SERPL-SCNC: 3.9 MMOL/L (ref 3.5–5.5)
PROT SERPL-MCNC: 7.1 G/DL (ref 6.4–8.2)
SODIUM SERPL-SCNC: 141 MMOL/L (ref 136–145)
TRIGL SERPL-MCNC: 127 MG/DL (ref ?–150)
VLDLC SERPL CALC-MCNC: 25.4 MG/DL

## 2021-12-07 PROCEDURE — 83036 HEMOGLOBIN GLYCOSYLATED A1C: CPT

## 2021-12-07 PROCEDURE — 80053 COMPREHEN METABOLIC PANEL: CPT

## 2021-12-07 PROCEDURE — 80061 LIPID PANEL: CPT

## 2021-12-07 PROCEDURE — 82306 VITAMIN D 25 HYDROXY: CPT

## 2021-12-07 PROCEDURE — 36415 COLL VENOUS BLD VENIPUNCTURE: CPT

## 2021-12-14 ENCOUNTER — TELEPHONE (OUTPATIENT)
Dept: INTERNAL MEDICINE CLINIC | Age: 66
End: 2021-12-14

## 2021-12-14 ENCOUNTER — OFFICE VISIT (OUTPATIENT)
Dept: INTERNAL MEDICINE CLINIC | Age: 66
End: 2021-12-14
Payer: COMMERCIAL

## 2021-12-14 VITALS
DIASTOLIC BLOOD PRESSURE: 88 MMHG | WEIGHT: 224 LBS | BODY MASS INDEX: 30.34 KG/M2 | HEART RATE: 67 BPM | OXYGEN SATURATION: 99 % | HEIGHT: 72 IN | SYSTOLIC BLOOD PRESSURE: 147 MMHG | TEMPERATURE: 97.7 F | RESPIRATION RATE: 18 BRPM

## 2021-12-14 DIAGNOSIS — R73.03 PREDIABETES: ICD-10-CM

## 2021-12-14 DIAGNOSIS — K50.10 CROHN'S DISEASE OF COLON WITHOUT COMPLICATION (HCC): ICD-10-CM

## 2021-12-14 DIAGNOSIS — I10 WHITE COAT SYNDROME WITH DIAGNOSIS OF HYPERTENSION: Primary | ICD-10-CM

## 2021-12-14 DIAGNOSIS — I48.11 LONGSTANDING PERSISTENT ATRIAL FIBRILLATION (HCC): ICD-10-CM

## 2021-12-14 DIAGNOSIS — E55.9 VITAMIN D DEFICIENCY: ICD-10-CM

## 2021-12-14 DIAGNOSIS — H90.2 CONDUCTIVE HEARING LOSS, UNSPECIFIED LATERALITY: Primary | ICD-10-CM

## 2021-12-14 PROCEDURE — G8536 NO DOC ELDER MAL SCRN: HCPCS | Performed by: INTERNAL MEDICINE

## 2021-12-14 PROCEDURE — G8417 CALC BMI ABV UP PARAM F/U: HCPCS | Performed by: INTERNAL MEDICINE

## 2021-12-14 PROCEDURE — G8753 SYS BP > OR = 140: HCPCS | Performed by: INTERNAL MEDICINE

## 2021-12-14 PROCEDURE — G8427 DOCREV CUR MEDS BY ELIG CLIN: HCPCS | Performed by: INTERNAL MEDICINE

## 2021-12-14 PROCEDURE — 99214 OFFICE O/P EST MOD 30 MIN: CPT | Performed by: INTERNAL MEDICINE

## 2021-12-14 PROCEDURE — 3017F COLORECTAL CA SCREEN DOC REV: CPT | Performed by: INTERNAL MEDICINE

## 2021-12-14 PROCEDURE — 1101F PT FALLS ASSESS-DOCD LE1/YR: CPT | Performed by: INTERNAL MEDICINE

## 2021-12-14 PROCEDURE — G8754 DIAS BP LESS 90: HCPCS | Performed by: INTERNAL MEDICINE

## 2021-12-14 PROCEDURE — G8510 SCR DEP NEG, NO PLAN REQD: HCPCS | Performed by: INTERNAL MEDICINE

## 2021-12-14 RX ORDER — TADALAFIL 20 MG/1
20 TABLET ORAL
Qty: 30 TABLET | Refills: 6 | Status: SHIPPED | OUTPATIENT
Start: 2021-12-14

## 2021-12-14 NOTE — PATIENT INSTRUCTIONS
High Blood Pressure: Care Instructions  Overview     It's normal for blood pressure to go up and down throughout the day. But if it stays up, you have high blood pressure. Another name for high blood pressure is hypertension. Despite what a lot of people think, high blood pressure usually doesn't cause headaches or make you feel dizzy or lightheaded. It usually has no symptoms. But it does increase your risk of stroke, heart attack, and other problems. You and your doctor will talk about your risks of these problems based on your blood pressure. Your doctor will give you a goal for your blood pressure. Your goal will be based on your health and your age. Lifestyle changes, such as eating healthy and being active, are always important to help lower blood pressure. You might also take medicine to reach your blood pressure goal.  Follow-up care is a key part of your treatment and safety. Be sure to make and go to all appointments, and call your doctor if you are having problems. It's also a good idea to know your test results and keep a list of the medicines you take. How can you care for yourself at home? Medical treatment  · If you stop taking your medicine, your blood pressure will go back up. You may take one or more types of medicine to lower your blood pressure. Be safe with medicines. Take your medicine exactly as prescribed. Call your doctor if you think you are having a problem with your medicine. · Talk to your doctor before you start taking aspirin every day. Aspirin can help certain people lower their risk of a heart attack or stroke. But taking aspirin isn't right for everyone, because it can cause serious bleeding. · See your doctor regularly. You may need to see the doctor more often at first or until your blood pressure comes down. · If you are taking blood pressure medicine, talk to your doctor before you take decongestants or anti-inflammatory medicine, such as ibuprofen.  Some of these medicines can raise blood pressure. · Learn how to check your blood pressure at home. Lifestyle changes  · Stay at a healthy weight. This is especially important if you put on weight around the waist. Losing even 10 pounds can help you lower your blood pressure. · If your doctor recommends it, get more exercise. Walking is a good choice. Bit by bit, increase the amount you walk every day. Try for at least 30 minutes on most days of the week. You also may want to swim, bike, or do other activities. · Avoid or limit alcohol. Talk to your doctor about whether you can drink any alcohol. · Try to limit how much sodium you eat to less than 2,300 milligrams (mg) a day. Your doctor may ask you to try to eat less than 1,500 mg a day. · Eat plenty of fruits (such as bananas and oranges), vegetables, legumes, whole grains, and low-fat dairy products. · Lower the amount of saturated fat in your diet. Saturated fat is found in animal products such as milk, cheese, and meat. Limiting these foods may help you lose weight and also lower your risk for heart disease. · Do not smoke. Smoking increases your risk for heart attack and stroke. If you need help quitting, talk to your doctor about stop-smoking programs and medicines. These can increase your chances of quitting for good. When should you call for help? Call 911  anytime you think you may need emergency care. This may mean having symptoms that suggest that your blood pressure is causing a serious heart or blood vessel problem. Your blood pressure may be over 180/120. For example, call 911 if:    · You have symptoms of a heart attack. These may include:  ? Chest pain or pressure, or a strange feeling in the chest.  ? Sweating. ? Shortness of breath. ? Nausea or vomiting. ? Pain, pressure, or a strange feeling in the back, neck, jaw, or upper belly or in one or both shoulders or arms. ? Lightheadedness or sudden weakness.   ? A fast or irregular heartbeat.     · You have symptoms of a stroke. These may include:  ? Sudden numbness, tingling, weakness, or loss of movement in your face, arm, or leg, especially on only one side of your body. ? Sudden vision changes. ? Sudden trouble speaking. ? Sudden confusion or trouble understanding simple statements. ? Sudden problems with walking or balance. ? A sudden, severe headache that is different from past headaches.     · You have severe back or belly pain. Do not wait until your blood pressure comes down on its own. Get help right away. Call your doctor now or seek immediate care if:    · Your blood pressure is much higher than normal (such as 180/120 or higher), but you don't have symptoms.     · You think high blood pressure is causing symptoms, such as:  ? Severe headache.  ? Blurry vision. Watch closely for changes in your health, and be sure to contact your doctor if:    · Your blood pressure measures higher than your doctor recommends at least 2 times. That means the top number is higher or the bottom number is higher, or both.     · You think you may be having side effects from your blood pressure medicine. Where can you learn more? Go to http://www.gray.com/  Enter [id number] in the search box to learn more about \"High Blood Pressure: Care Instructions. \"  Current as of: April 29, 2021               Content Version: 13.0  © 2006-2021 [employer]. Care instructions adapted under license by [employer] (which disclaims liability or warranty for this information). If you have questions about a medical condition or this instruction, always ask your healthcare professional. [place] [address] any warranty or liability for your use of this information.

## 2021-12-14 NOTE — PROGRESS NOTES
Chela Santana is a 77 y.o.  male and presents with Hypertension (bring in machine ), Blood sugar problem, Vitamin D Deficiency, and Irregular Heart Beat      SUBJECTIVE:  Pt's HTN is uncontrolled on Cardizem 240 mg in the office. He may have whitecoat hypertension since blood pressure readings are better at home so he will bring his blood pressure cuff at next office visit. He has not done well with tolerating other blood pressure medicines so we will try it improve blood pressure further with  weight loss. Shanon Sandeep He denies any fatigue. He is followed by Cardiology for his Afib, He will continue to monitor his BP at home and try to lose 10-20 lbs. Pt's BPH and LUTS are controlled off medications currently. Pt is followed by urology. He had a negative biopsy for prostate cancer. Pt is followed by GI for his Crohn's disease which is fairly stable off mesalamine. Pt continues to try and cut back the sweats and carbs in his diet to improve his prediabetes. .  Allergies are well controlled on Claritin-D and Flonase and Singulair. Pt hasVit D deficiency that is uncontrolled on vit D 2000 units/day. Respiratory ROS: negative for - shortness of breath  Cardiovascular ROS: negative for - chest pain    Current Outpatient Medications   Medication Sig    tadalafiL (CIALIS) 20 mg tablet Take 1 Tablet by mouth daily as needed for Erectile Dysfunction.  aspirin 81 mg chewable tablet Take 81 mg by mouth daily.  fluticasone propionate (Flonase Allergy Relief) 50 mcg/actuation nasal spray 2 Sprays by Both Nostrils route daily.  dilTIAZem CD (CARDIZEM CD) 240 mg ER capsule Take 1 Cap by mouth daily.  flecainide (TAMBOCOR) 50 mg tablet 50 mg two (2) times a day. No current facility-administered medications for this visit.          OBJECTIVE:  alert, well appearing, and in no distress  Visit Vitals  BP (!) 147/88 (BP 1 Location: Left arm, BP Patient Position: Sitting, BP Cuff Size: Adult)   Pulse 67 Temp 97.7 °F (36.5 °C) (Temporal)   Resp 18   Ht 6' (1.829 m)   Wt 224 lb (101.6 kg)   SpO2 99%   BMI 30.38 kg/m²      well developed and well nourished  Chest - clear to auscultation, no wheezes, rales or rhonchi, symmetric air entry  Heart - normal rate, regular rhythm, normal S1, S2, no murmurs, rubs, clicks or gallops  Extremities - peripheral pulses normal, no pedal edema, no clubbing or cyanosis    Labs:   Lab Results   Component Value Date/Time    Cholesterol, total 137 12/07/2021 08:50 AM    HDL Cholesterol 43 12/07/2021 08:50 AM    LDL, calculated 68.6 12/07/2021 08:50 AM    Triglyceride 127 12/07/2021 08:50 AM    CHOL/HDL Ratio 3.2 12/07/2021 08:50 AM     Lab Results   Component Value Date/Time    ALT (SGPT) 33 12/07/2021 08:50 AM    Alk.  phosphatase 92 12/07/2021 08:50 AM    Bilirubin, total 0.2 12/07/2021 08:50 AM     Lab Results   Component Value Date/Time    GFR est AA >60 12/07/2021 08:50 AM    GFR est non-AA >60 12/07/2021 08:50 AM    Creatinine 0.99 12/07/2021 08:50 AM    BUN 14 12/07/2021 08:50 AM    Sodium 141 12/07/2021 08:50 AM    Potassium 3.9 12/07/2021 08:50 AM    Chloride 107 12/07/2021 08:50 AM    CO2 32 12/07/2021 08:50 AM      Lab Results   Component Value Date/Time    Prostate Specific Ag 0.85 09/16/2021 09:09 AM    Prostate Specific Ag 4.58 (H) 09/03/2020 09:32 AM    Prostate Specific Ag 5.02 (H) 07/11/2019 10:50 AM    Prostate Specific Ag 5.0 (H) 04/09/2019 09:41 AM    Prostate Specific Ag 3.6 04/09/2018 08:58 AM    Prostate Specific Ag 2.5 02/16/2017 09:14 AM     Lab Results   Component Value Date/Time    Glucose 72 (L) 12/07/2021 08:50 AM      Labs:   Lab Results   Component Value Date/Time    Hemoglobin A1c 5.9 (H) 12/07/2021 08:50 AM    Hemoglobin A1c 6.1 (H) 09/07/2021 10:07 AM    Hemoglobin A1c 5.7 (H) 04/26/2021 08:33 AM    Glucose 72 (L) 12/07/2021 08:50 AM    LDL, calculated 68.6 12/07/2021 08:50 AM    Creatinine 0.99 12/07/2021 08:50 AM          Assessment/Plan ICD-10-CM ICD-9-CM    1. White coat syndrome with diagnosis of hypertension  I10 401.9  patient will continue Cardizem  mg daily and try and lose weight to improve further. Patient to bring in blood pressure cuff next office visit. LIPID PANEL      METABOLIC PANEL, COMPREHENSIVE   2. Prediabetes  R73.03 790.29  patient retrying continue to control with diet and weight loss HEMOGLOBIN A1C W/O EAG   3. Vitamin D deficiency  E55.9 268.9  uncontrolled patient increase to vitamin D 3000 units/day VITAMIN D, 25 HYDROXY   4. Longstanding persistent atrial fibrillation (HCC)  I48.11 427.31  stable on flecainide and followed by cardiology   5. [facility]  B29.82 555.1  stable off medications and followed by GI         Follow-up and Dispositions    · Return in about 4 months (around 4/14/2022) for labs 1 week before. Reviewed plan of care. Patient has provided input and agrees with goals.

## 2021-12-14 NOTE — TELEPHONE ENCOUNTER
Patient said that he had an appt today 12/14/21 with Dr. Andrews Has but forgot to ask about seeing a hearing doctor. Patient states that he has been experiencing some hearing loss, with his left ear being more effected. Patient states he has been dealing with this for years. He is requesting to speak to someone about going to see a specialist for hearing loss. Patient is requesting a call at [phone].     Please advise, thank you

## 2021-12-14 NOTE — TELEPHONE ENCOUNTER
Left detailed message for patient referral was faxed. Specialist will call and schedule an appointment however phone number was provided for patient to call and schedule an appointment.

## 2021-12-14 NOTE — PROGRESS NOTES
Patient is in the office today for a 3 month follow up. Do you have an Advance Directive no  Do you want more information : information given     1. \"Have you been to the ER, urgent care clinic since your last visit? Hospitalized since your last visit? \" No    2. \"Have you seen or consulted any other health care providers outside of the [address] [place] [address] since your last visit? \" No     3. For patients aged 39-70: Has the patient had a colonoscopy? Yes, HM satisfied with blue hyperlink     If the patient is female:    4. For patients aged 41-77: Has the patient had a mammogram within the past 2 years? NA based on age or sex    11. For patients aged 21-65: Has the patient had a pap smear?  NA based on age or sex

## 2022-04-12 ENCOUNTER — APPOINTMENT (OUTPATIENT)
Dept: INTERNAL MEDICINE CLINIC | Age: 67
End: 2022-04-12

## 2022-04-12 ENCOUNTER — HOSPITAL ENCOUNTER (OUTPATIENT)
Dept: LAB | Age: 67
Discharge: HOME | End: 2022-04-12
Payer: COMMERCIAL

## 2022-04-12 DIAGNOSIS — E55.9 VITAMIN D DEFICIENCY: ICD-10-CM

## 2022-04-12 DIAGNOSIS — I10 WHITE COAT SYNDROME WITH DIAGNOSIS OF HYPERTENSION: ICD-10-CM

## 2022-04-12 DIAGNOSIS — R73.03 PREDIABETES: ICD-10-CM

## 2022-04-12 LAB
ALBUMIN SERPL-MCNC: 3.5 G/DL (ref 3.4–5)
ALBUMIN/GLOB SERPL: 0.9 {RATIO} (ref 0.8–1.7)
ALP SERPL-CCNC: 94 U/L (ref 45–117)
ALT SERPL-CCNC: 36 U/L (ref 16–61)
ANION GAP SERPL CALC-SCNC: 1 MMOL/L (ref 3–18)
AST SERPL-CCNC: 23 U/L (ref 10–38)
BILIRUB SERPL-MCNC: 0.3 MG/DL (ref 0.2–1)
BUN SERPL-MCNC: 11 MG/DL (ref 7–18)
BUN/CREAT SERPL: 11 (ref 12–20)
CALCIUM SERPL-MCNC: 9.6 MG/DL (ref 8.5–10.1)
CHLORIDE SERPL-SCNC: 108 MMOL/L (ref 100–111)
CHOLEST SERPL-MCNC: 157 MG/DL
CO2 SERPL-SCNC: 30 MMOL/L (ref 21–32)
CREAT SERPL-MCNC: 1 MG/DL (ref 0.6–1.3)
GLOBULIN SER CALC-MCNC: 3.7 G/DL (ref 2–4)
GLUCOSE SERPL-MCNC: 97 MG/DL (ref 74–99)
HBA1C MFR BLD: 6.3 % (ref 4.2–5.6)
HDLC SERPL-MCNC: 42 MG/DL (ref 40–60)
HDLC SERPL: 3.7 {RATIO} (ref 0–5)
LDLC SERPL CALC-MCNC: 88.6 MG/DL (ref 0–100)
LIPID PROFILE,FLP: NORMAL
POTASSIUM SERPL-SCNC: 4.2 MMOL/L (ref 3.5–5.5)
PROT SERPL-MCNC: 7.2 G/DL (ref 6.4–8.2)
SODIUM SERPL-SCNC: 139 MMOL/L (ref 136–145)
TRIGL SERPL-MCNC: 132 MG/DL (ref ?–150)
VLDLC SERPL CALC-MCNC: 26.4 MG/DL

## 2022-04-12 PROCEDURE — 36415 COLL VENOUS BLD VENIPUNCTURE: CPT

## 2022-04-12 PROCEDURE — 82306 VITAMIN D 25 HYDROXY: CPT

## 2022-04-12 PROCEDURE — 80061 LIPID PANEL: CPT

## 2022-04-12 PROCEDURE — 80053 COMPREHEN METABOLIC PANEL: CPT

## 2022-04-12 PROCEDURE — 83036 HEMOGLOBIN GLYCOSYLATED A1C: CPT

## 2022-04-13 LAB — 25(OH)D3 SERPL-MCNC: 26.8 NG/ML (ref 30–100)

## 2022-04-19 ENCOUNTER — OFFICE VISIT (OUTPATIENT)
Dept: INTERNAL MEDICINE CLINIC | Age: 67
End: 2022-04-19
Payer: COMMERCIAL

## 2022-04-19 VITALS
HEART RATE: 58 BPM | OXYGEN SATURATION: 98 % | TEMPERATURE: 97.8 F | BODY MASS INDEX: 29.93 KG/M2 | DIASTOLIC BLOOD PRESSURE: 93 MMHG | RESPIRATION RATE: 20 BRPM | HEIGHT: 72 IN | SYSTOLIC BLOOD PRESSURE: 181 MMHG | WEIGHT: 221 LBS

## 2022-04-19 DIAGNOSIS — R73.03 PREDIABETES: ICD-10-CM

## 2022-04-19 DIAGNOSIS — I10 ESSENTIAL HYPERTENSION: Primary | ICD-10-CM

## 2022-04-19 DIAGNOSIS — I48.11 LONGSTANDING PERSISTENT ATRIAL FIBRILLATION (HCC): ICD-10-CM

## 2022-04-19 DIAGNOSIS — K50.10 CROHN'S DISEASE OF COLON WITHOUT COMPLICATION (HCC): ICD-10-CM

## 2022-04-19 DIAGNOSIS — E55.9 VITAMIN D DEFICIENCY: ICD-10-CM

## 2022-04-19 PROCEDURE — 99214 OFFICE O/P EST MOD 30 MIN: CPT | Performed by: INTERNAL MEDICINE

## 2022-04-19 RX ORDER — VALSARTAN 160 MG/1
160 TABLET ORAL DAILY
Qty: 30 TABLET | Refills: 1 | Status: SHIPPED | OUTPATIENT
Start: 2022-04-19 | End: 2022-07-04

## 2022-04-19 NOTE — PATIENT INSTRUCTIONS
High Blood Pressure: Care Instructions  Overview     It's normal for blood pressure to go up and down throughout the day. But if it stays up, you have high blood pressure. Another name for high blood pressure is hypertension. Despite what a lot of people think, high blood pressure usually doesn't cause headaches or make you feel dizzy or lightheaded. It usually has no symptoms. But it does increase your risk of stroke, heart attack, and other problems. You and your doctor will talk about your risks of these problems based on your blood pressure. Your doctor will give you a goal for your blood pressure. Your goal will be based on your health and your age. Lifestyle changes, such as eating healthy and being active, are always important to help lower blood pressure. You might also take medicine to reach your blood pressure goal.  Follow-up care is a key part of your treatment and safety. Be sure to make and go to all appointments, and call your doctor if you are having problems. It's also a good idea to know your test results and keep a list of the medicines you take. How can you care for yourself at home? Medical treatment  · If you stop taking your medicine, your blood pressure will go back up. You may take one or more types of medicine to lower your blood pressure. Be safe with medicines. Take your medicine exactly as prescribed. Call your doctor if you think you are having a problem with your medicine. · Talk to your doctor before you start taking aspirin every day. Aspirin can help certain people lower their risk of a heart attack or stroke. But taking aspirin isn't right for everyone, because it can cause serious bleeding. · See your doctor regularly. You may need to see the doctor more often at first or until your blood pressure comes down. · If you are taking blood pressure medicine, talk to your doctor before you take decongestants or anti-inflammatory medicine, such as ibuprofen.  Some of these medicines can raise blood pressure. · Learn how to check your blood pressure at home. Lifestyle changes  · Stay at a healthy weight. This is especially important if you put on weight around the waist. Losing even 10 pounds can help you lower your blood pressure. · If your doctor recommends it, get more exercise. Walking is a good choice. Bit by bit, increase the amount you walk every day. Try for at least 30 minutes on most days of the week. You also may want to swim, bike, or do other activities. · Avoid or limit alcohol. Talk to your doctor about whether you can drink any alcohol. · Try to limit how much sodium you eat to less than 2,300 milligrams (mg) a day. Your doctor may ask you to try to eat less than 1,500 mg a day. · Eat plenty of fruits (such as bananas and oranges), vegetables, legumes, whole grains, and low-fat dairy products. · Lower the amount of saturated fat in your diet. Saturated fat is found in animal products such as milk, cheese, and meat. Limiting these foods may help you lose weight and also lower your risk for heart disease. · Do not smoke. Smoking increases your risk for heart attack and stroke. If you need help quitting, talk to your doctor about stop-smoking programs and medicines. These can increase your chances of quitting for good. When should you call for help? Call 911  anytime you think you may need emergency care. This may mean having symptoms that suggest that your blood pressure is causing a serious heart or blood vessel problem. Your blood pressure may be over 180/120. For example, call 911 if:    · You have symptoms of a heart attack. These may include:  ? Chest pain or pressure, or a strange feeling in the chest.  ? Sweating. ? Shortness of breath. ? Nausea or vomiting. ? Pain, pressure, or a strange feeling in the back, neck, jaw, or upper belly or in one or both shoulders or arms. ? Lightheadedness or sudden weakness.   ? A fast or irregular heartbeat.     · You have symptoms of a stroke. These may include:  ? Sudden numbness, tingling, weakness, or loss of movement in your face, arm, or leg, especially on only one side of your body. ? Sudden vision changes. ? Sudden trouble speaking. ? Sudden confusion or trouble understanding simple statements. ? Sudden problems with walking or balance. ? A sudden, severe headache that is different from past headaches.     · You have severe back or belly pain. Do not wait until your blood pressure comes down on its own. Get help right away. Call your doctor now or seek immediate care if:    · Your blood pressure is much higher than normal (such as 180/120 or higher), but you don't have symptoms.     · You think high blood pressure is causing symptoms, such as:  ? Severe headache.  ? Blurry vision. Watch closely for changes in your health, and be sure to contact your doctor if:    · Your blood pressure measures higher than your doctor recommends at least 2 times. That means the top number is higher or the bottom number is higher, or both.     · You think you may be having side effects from your blood pressure medicine. Where can you learn more? Go to http://www.gray.com/  Enter [id number] in the search box to learn more about \"High Blood Pressure: Care Instructions. \"  Current as of: January 10, 2022               Content Version: 13.2  © 2006-2022 [employer]. Care instructions adapted under license by [employer] (which disclaims liability or warranty for this information). If you have questions about a medical condition or this instruction, always ask your healthcare professional. [place] [address] any warranty or liability for your use of this information.

## 2022-04-19 NOTE — PROGRESS NOTES
Patient is in the office today for a 4 month follow up. Patient is requesting an alternative for cialis. Patient states his insurance does not pay for this medication. Do you have an Advance Directive no  Do you want more information : information given     1. \"Have you been to the ER, urgent care clinic since your last visit? Hospitalized since your last visit? \" No    2. \"Have you seen or consulted any other health care providers outside of the [address] [place] [address] since your last visit? \" No     3. For patients aged 39-70: Has the patient had a colonoscopy / FIT/ Cologuard? Yes - no Care Gap present      If the patient is female:    4. For patients aged 41-77: Has the patient had a mammogram within the past 2 years? NA - based on age or sex      11. For patients aged 21-65: Has the patient had a pap smear?  NA - based on age or sex

## 2022-04-23 NOTE — PROGRESS NOTES
Cassie Naranjo is a 77 y.o.  male and presents with Hypertension, Blood sugar problem, Vitamin D Deficiency (f/u), Irregular Heart Beat, and Inflammatory Bowel Disease      SUBJECTIVE:  Pt's HTN is still uncontrolled on Cardizem 240 mg in the office and at home. He denies any fatigue. He is followed by Cardiology for his Afib, He will continue to monitor his BP at home as his BP meds are adjusted. Pt's BPH and LUTS are controlled off medications currently. Pt is followed by urology. He had a negative biopsy for prostate cancer. Pt is followed by GI for his Crohn's disease which is fairly stable off mesalamine. Pt continues to try and cut back the sweats and carbs in his diet to improve his prediabetes. .  Allergies are well controlled on Claritin-D and Flonase and Singulair. Pt hasVit D deficiency that is uncontrolled on vit D 2000 units/day. Respiratory ROS: negative for - shortness of breath  Cardiovascular ROS: negative for - chest pain    Current Outpatient Medications   Medication Sig    valsartan (DIOVAN) 160 mg tablet Take 1 Tablet by mouth daily.  aspirin 81 mg chewable tablet Take 81 mg by mouth daily.  fluticasone propionate (Flonase Allergy Relief) 50 mcg/actuation nasal spray 2 Sprays by Both Nostrils route daily.  dilTIAZem CD (CARDIZEM CD) 240 mg ER capsule Take 1 Cap by mouth daily.  flecainide (TAMBOCOR) 50 mg tablet 50 mg two (2) times a day.  tadalafiL (CIALIS) 20 mg tablet Take 1 Tablet by mouth daily as needed for Erectile Dysfunction. (Patient not taking: Reported on 4/19/2022)     No current facility-administered medications for this visit.          OBJECTIVE:  alert, well appearing, and in no distress  Visit Vitals  BP (!) 181/93 (BP 1 Location: Right arm, BP Patient Position: Sitting, BP Cuff Size: Adult)   Pulse (!) 58   Temp 97.8 °F (36.6 °C) (Temporal)   Resp 20   Ht 6' (1.829 m)   Wt 221 lb (100.2 kg)   SpO2 98%   BMI 29.97 kg/m²      well developed and well nourished  Chest - clear to auscultation, no wheezes, rales or rhonchi, symmetric air entry  Heart - normal rate, regular rhythm, normal S1, S2, no murmurs, rubs, clicks or gallops  Extremities - peripheral pulses normal, no pedal edema, no clubbing or cyanosis    Labs:   Lab Results   Component Value Date/Time    Cholesterol, total 157 04/12/2022 10:22 AM    HDL Cholesterol 42 04/12/2022 10:22 AM    LDL, calculated 88.6 04/12/2022 10:22 AM    Triglyceride 132 04/12/2022 10:22 AM    CHOL/HDL Ratio 3.7 04/12/2022 10:22 AM     Lab Results   Component Value Date/Time    ALT (SGPT) 36 04/12/2022 10:22 AM    Alk. phosphatase 94 04/12/2022 10:22 AM    Bilirubin, total 0.3 04/12/2022 10:22 AM     Lab Results   Component Value Date/Time    GFR est AA >60 04/12/2022 10:22 AM    GFR est non-AA >60 04/12/2022 10:22 AM    Creatinine 1.00 04/12/2022 10:22 AM    BUN 11 04/12/2022 10:22 AM    Sodium 139 04/12/2022 10:22 AM    Potassium 4.2 04/12/2022 10:22 AM    Chloride 108 04/12/2022 10:22 AM    CO2 30 04/12/2022 10:22 AM      Lab Results   Component Value Date/Time    Prostate Specific Ag 0.85 09/16/2021 09:09 AM    Prostate Specific Ag 4.58 (H) 09/03/2020 09:32 AM    Prostate Specific Ag 5.02 (H) 07/11/2019 10:50 AM    Prostate Specific Ag 5.0 (H) 04/09/2019 09:41 AM    Prostate Specific Ag 3.6 04/09/2018 08:58 AM    Prostate Specific Ag 2.5 02/16/2017 09:14 AM     Lab Results   Component Value Date/Time    Glucose 97 04/12/2022 10:22 AM      Labs:   Lab Results   Component Value Date/Time    Hemoglobin A1c 6.3 (H) 04/12/2022 10:22 AM    Hemoglobin A1c 5.9 (H) 12/07/2021 08:50 AM    Hemoglobin A1c 6.1 (H) 09/07/2021 10:07 AM    Glucose 97 04/12/2022 10:22 AM    LDL, calculated 88.6 04/12/2022 10:22 AM    Creatinine 1.00 04/12/2022 10:22 AM          Assessment/Plan    ICD-10-CM ICD-9-CM    1.  Essential hypertension  I10 401.9  uncontrolled will add valsartan (DIOVAN) 160 mg tablet to Cardizem  mg      METABOLIC PANEL, COMPREHENSIVE      LIPID PANEL   2. Prediabetes  R73.03 790.29  patient trying to control with diet and weight loss HEMOGLOBIN A1C W/O EAG   3. [facility]  Q94.33 555.1  patient doing well currently off of mesalamine and will follow up with GI   4. Vitamin D deficiency  E55.9 268.9  uncontrolled patient to take vitamin D 2000 units on a regular basis VITAMIN D, 25 HYDROXY   5. Longstanding persistent atrial fibrillation (HCC)  I48.11 427.31  patient stable on Cardizem flecainide and aspirin and followed by cardiology         Follow-up and Dispositions    · Return in about 4 weeks (around 5/17/2022) for BP check. Reviewed plan of care. Patient has provided input and agrees with goals.

## 2022-06-06 ENCOUNTER — OFFICE VISIT (OUTPATIENT)
Dept: INTERNAL MEDICINE CLINIC | Age: 67
End: 2022-06-06
Payer: COMMERCIAL

## 2022-06-06 VITALS
WEIGHT: 219 LBS | TEMPERATURE: 98.1 F | BODY MASS INDEX: 29.66 KG/M2 | HEART RATE: 57 BPM | OXYGEN SATURATION: 99 % | RESPIRATION RATE: 18 BRPM | SYSTOLIC BLOOD PRESSURE: 153 MMHG | HEIGHT: 72 IN | DIASTOLIC BLOOD PRESSURE: 78 MMHG

## 2022-06-06 DIAGNOSIS — I10 ESSENTIAL HYPERTENSION: Primary | ICD-10-CM

## 2022-06-06 PROCEDURE — 1123F ACP DISCUSS/DSCN MKR DOCD: CPT | Performed by: INTERNAL MEDICINE

## 2022-06-06 PROCEDURE — 99213 OFFICE O/P EST LOW 20 MIN: CPT | Performed by: INTERNAL MEDICINE

## 2022-06-06 NOTE — PROGRESS NOTES
Patient is in the office today for a 4 week follow up. Do you have an Advance Directive no  Do you want more information : information given     1. \"Have you been to the ER, urgent care clinic since your last visit? Hospitalized since your last visit? \" No    2. \"Have you seen or consulted any other health care providers outside of the [address] [place] [address] since your last visit? \" No     3. For patients aged 39-70: Has the patient had a colonoscopy / FIT/ Cologuard? Yes - no Care Gap present      If the patient is female:    4. For patients aged 41-77: Has the patient had a mammogram within the past 2 years? NA - based on age or sex      11. For patients aged 21-65: Has the patient had a pap smear?  NA - based on age or sex

## 2022-06-06 NOTE — PATIENT INSTRUCTIONS
High Blood Pressure: Care Instructions  Overview     It's normal for blood pressure to go up and down throughout the day. But if it stays up, you have high blood pressure. Another name for high blood pressure is hypertension. Despite what a lot of people think, high blood pressure usually doesn't cause headaches or make you feel dizzy or lightheaded. It usually has no symptoms. But it does increase your risk of stroke, heart attack, and other problems. You and your doctor will talk about your risks of these problems based on your blood pressure. Your doctor will give you a goal for your blood pressure. Your goal will be based on your health and your age. Lifestyle changes, such as eating healthy and being active, are always important to help lower blood pressure. You might also take medicine to reach your blood pressure goal.  Follow-up care is a key part of your treatment and safety. Be sure to make and go to all appointments, and call your doctor if you are having problems. It's also a good idea to know your test results and keep a list of the medicines you take. How can you care for yourself at home? Medical treatment  · If you stop taking your medicine, your blood pressure will go back up. You may take one or more types of medicine to lower your blood pressure. Be safe with medicines. Take your medicine exactly as prescribed. Call your doctor if you think you are having a problem with your medicine. · Talk to your doctor before you start taking aspirin every day. Aspirin can help certain people lower their risk of a heart attack or stroke. But taking aspirin isn't right for everyone, because it can cause serious bleeding. · See your doctor regularly. You may need to see the doctor more often at first or until your blood pressure comes down. · If you are taking blood pressure medicine, talk to your doctor before you take decongestants or anti-inflammatory medicine, such as ibuprofen.  Some of these medicines can raise blood pressure. · Learn how to check your blood pressure at home. Lifestyle changes  · Stay at a healthy weight. This is especially important if you put on weight around the waist. Losing even 10 pounds can help you lower your blood pressure. · If your doctor recommends it, get more exercise. Walking is a good choice. Bit by bit, increase the amount you walk every day. Try for at least 30 minutes on most days of the week. You also may want to swim, bike, or do other activities. · Avoid or limit alcohol. Talk to your doctor about whether you can drink any alcohol. · Try to limit how much sodium you eat to less than 2,300 milligrams (mg) a day. Your doctor may ask you to try to eat less than 1,500 mg a day. · Eat plenty of fruits (such as bananas and oranges), vegetables, legumes, whole grains, and low-fat dairy products. · Lower the amount of saturated fat in your diet. Saturated fat is found in animal products such as milk, cheese, and meat. Limiting these foods may help you lose weight and also lower your risk for heart disease. · Do not smoke. Smoking increases your risk for heart attack and stroke. If you need help quitting, talk to your doctor about stop-smoking programs and medicines. These can increase your chances of quitting for good. When should you call for help? Call 911  anytime you think you may need emergency care. This may mean having symptoms that suggest that your blood pressure is causing a serious heart or blood vessel problem. Your blood pressure may be over 180/120. For example, call 911 if:    · You have symptoms of a heart attack. These may include:  ? Chest pain or pressure, or a strange feeling in the chest.  ? Sweating. ? Shortness of breath. ? Nausea or vomiting. ? Pain, pressure, or a strange feeling in the back, neck, jaw, or upper belly or in one or both shoulders or arms. ? Lightheadedness or sudden weakness.   ? A fast or irregular heartbeat.     · You have symptoms of a stroke. These may include:  ? Sudden numbness, tingling, weakness, or loss of movement in your face, arm, or leg, especially on only one side of your body. ? Sudden vision changes. ? Sudden trouble speaking. ? Sudden confusion or trouble understanding simple statements. ? Sudden problems with walking or balance. ? A sudden, severe headache that is different from past headaches.     · You have severe back or belly pain. Do not wait until your blood pressure comes down on its own. Get help right away. Call your doctor now or seek immediate care if:    · Your blood pressure is much higher than normal (such as 180/120 or higher), but you don't have symptoms.     · You think high blood pressure is causing symptoms, such as:  ? Severe headache.  ? Blurry vision. Watch closely for changes in your health, and be sure to contact your doctor if:    · Your blood pressure measures higher than your doctor recommends at least 2 times. That means the top number is higher or the bottom number is higher, or both.     · You think you may be having side effects from your blood pressure medicine. Where can you learn more? Go to http://www.gray.com/  Enter [id number] in the search box to learn more about \"High Blood Pressure: Care Instructions. \"  Current as of: January 10, 2022               Content Version: 13.2  © 2006-2022 [employer]. Care instructions adapted under license by [employer] (which disclaims liability or warranty for this information). If you have questions about a medical condition or this instruction, always ask your healthcare professional. [place] [address] any warranty or liability for your use of this information.

## 2022-06-09 NOTE — PROGRESS NOTES
Edi Bradshaw is a 77 y.o.  male and presents with Blood Pressure Check (f/u) and Hypertension      SUBJECTIVE:    Patient's high blood pressure remains uncontrolled on Cardizem  mg with adding Diovan 160 mg daily. Patient's thought the Diovan was causing a rash that he was having and stopped it for period of time. However he realized that the diarrhea was not related to the rash and he has resumed medication. He feels that his blood pressure is coming down since starting the Diovan and he denies any other side effects from the medication    Respiratory ROS: negative for - shortness of breath  Cardiovascular ROS: negative for - chest pain    Current Outpatient Medications   Medication Sig    valsartan (DIOVAN) 160 mg tablet Take 1 Tablet by mouth daily.  aspirin 81 mg chewable tablet Take 81 mg by mouth daily.  fluticasone propionate (Flonase Allergy Relief) 50 mcg/actuation nasal spray 2 Sprays by Both Nostrils route daily.  dilTIAZem CD (CARDIZEM CD) 240 mg ER capsule Take 1 Cap by mouth daily.  flecainide (TAMBOCOR) 50 mg tablet 50 mg two (2) times a day.  tadalafiL (CIALIS) 20 mg tablet Take 1 Tablet by mouth daily as needed for Erectile Dysfunction. (Patient not taking: Reported on 4/19/2022)     No current facility-administered medications for this visit. OBJECTIVE:  alert, well appearing, and in no distress  Visit Vitals  BP (!) 153/78 (BP 1 Location: Right arm, BP Patient Position: Sitting, BP Cuff Size: Adult)   Pulse (!) 57   Temp 98.1 °F (36.7 °C) (Temporal)   Resp 18   Ht 6' (1.829 m)   Wt 219 lb (99.3 kg)   SpO2 99%   BMI 29.70 kg/m²      well developed and well nourished        Assessment/Plan      ICD-10-CM ICD-9-CM    1. Essential hypertension  I10 401.9  uncontrolled. We will continue Cardizem  mg daily and Diovan 160 mg daily.   If systolic blood pressure is not down under 130 in the next 2 to 3 weeks patient advised to increase the Diovan to 320 mg daily.     Follow-up and Dispositions    · Return in about 4 weeks (around 7/4/2022) for BP check. Reviewed plan of care. Patient has provided input and agrees with goals.

## 2022-07-15 ENCOUNTER — OFFICE VISIT (OUTPATIENT)
Dept: INTERNAL MEDICINE CLINIC | Age: 67
End: 2022-07-15
Payer: COMMERCIAL

## 2022-07-15 VITALS
BODY MASS INDEX: 29.53 KG/M2 | DIASTOLIC BLOOD PRESSURE: 75 MMHG | HEART RATE: 56 BPM | OXYGEN SATURATION: 98 % | TEMPERATURE: 97.6 F | SYSTOLIC BLOOD PRESSURE: 147 MMHG | HEIGHT: 72 IN | WEIGHT: 218 LBS | RESPIRATION RATE: 18 BRPM

## 2022-07-15 DIAGNOSIS — K04.7 TOOTH INFECTION: ICD-10-CM

## 2022-07-15 DIAGNOSIS — J30.1 SEASONAL ALLERGIC RHINITIS DUE TO POLLEN: ICD-10-CM

## 2022-07-15 DIAGNOSIS — I10 ESSENTIAL HYPERTENSION: Primary | ICD-10-CM

## 2022-07-15 PROCEDURE — 1123F ACP DISCUSS/DSCN MKR DOCD: CPT | Performed by: INTERNAL MEDICINE

## 2022-07-15 PROCEDURE — 99213 OFFICE O/P EST LOW 20 MIN: CPT | Performed by: INTERNAL MEDICINE

## 2022-07-15 RX ORDER — VALSARTAN 320 MG/1
320 TABLET ORAL DAILY
Qty: 90 TABLET | Refills: 1 | Status: SHIPPED | OUTPATIENT
Start: 2022-07-15

## 2022-07-15 RX ORDER — AZELASTINE 1 MG/ML
2 SPRAY, METERED NASAL 2 TIMES DAILY
Qty: 1 EACH | Refills: 5 | Status: SHIPPED | OUTPATIENT
Start: 2022-07-15

## 2022-07-15 RX ORDER — AMOXICILLIN 875 MG/1
875 TABLET, FILM COATED ORAL 2 TIMES DAILY
Qty: 14 TABLET | Refills: 0 | Status: SHIPPED | OUTPATIENT
Start: 2022-07-15 | End: 2022-07-22

## 2022-07-15 NOTE — PROGRESS NOTES
Patient is in the office today for a 4 week follow up. Patient states he has a tooth ache and will be calling the dentist for an appointment. Patient states he would like to have a new nasal spray Azstelin. Do you have an Advance Directive no  Do you want more information : information given     1. \"Have you been to the ER, urgent care clinic since your last visit? Hospitalized since your last visit? \" No    2. \"Have you seen or consulted any other health care providers outside of the [address] [place] [address] since your last visit? \" No     3. For patients aged 39-70: Has the patient had a colonoscopy / FIT/ Cologuard? Yes - no Care Gap present      If the patient is female:    4. For patients aged 41-77: Has the patient had a mammogram within the past 2 years? NA - based on age or sex      11. For patients aged 21-65: Has the patient had a pap smear?  NA - based on age or sex

## 2022-07-15 NOTE — PROGRESS NOTES
Jaida Fierro is a 77 y.o.  male and presents with Blood Pressure Check and Dental Pain      SUBJECTIVE:    Patient's high blood pressure remains uncontrolled on Cardizem  mg and Diovan 160 mg daily. He continues to work on trying to cut back sugar, starches and processed food in his diet. He is also working and trying to increase exercise. Pt also c/o Dental pain since last night in area off a broken tooth. He will schedule f/u with dentist but will treat with antibiotics for possible infection. Respiratory ROS: negative for - shortness of breath  Cardiovascular ROS: negative for - chest pain    Current Outpatient Medications   Medication Sig    azelastine (ASTELIN) 137 mcg (0.1 %) nasal spray 2 Sprays by Both Nostrils route two (2) times a day. Use in each nostril as directed    amoxicillin (AMOXIL) 875 mg tablet Take 1 Tablet by mouth two (2) times a day for 7 days.  valsartan (DIOVAN) 320 mg tablet Take 1 Tablet by mouth daily.  aspirin 81 mg chewable tablet Take 81 mg by mouth daily.  fluticasone propionate (Flonase Allergy Relief) 50 mcg/actuation nasal spray 2 Sprays by Both Nostrils route daily.  dilTIAZem CD (CARDIZEM CD) 240 mg ER capsule Take 1 Cap by mouth daily.  flecainide (TAMBOCOR) 50 mg tablet 50 mg two (2) times a day.  tadalafiL (CIALIS) 20 mg tablet Take 1 Tablet by mouth daily as needed for Erectile Dysfunction. (Patient not taking: Reported on 4/19/2022)     No current facility-administered medications for this visit. OBJECTIVE:  alert, well appearing, and in no distress  Visit Vitals  BP (!) 147/75 (BP 1 Location: Right arm, BP Patient Position: Sitting, BP Cuff Size: Adult)   Pulse (!) 56   Temp 97.6 °F (36.4 °C) (Temporal)   Resp 18   Ht 6' (1.829 m)   Wt 218 lb (98.9 kg)   SpO2 98%   BMI 29.57 kg/m²      well developed and well nourished        Assessment/Plan      ICD-10-CM ICD-9-CM    1. Essential hypertension  I10 401.9 Uncontrolled.  Will continue Cardizem  mg and increase to valsartan (DIOVAN) 320 mg tablet   2. Seasonal allergic rhinitis due to pollen  J30.1 477.0 azelastine (ASTELIN) 137 mcg (0.1 %) nasal spray   3. Tooth infection  K04.7 522.4 Will treat with amoxicillin (AMOXIL) 875 mg tablet BID        Follow-up and Dispositions    · Return in about 3 months (around 10/15/2022) for labs 1 week before. Reviewed plan of care. Patient has provided input and agrees with goals.

## 2022-07-21 ENCOUNTER — TELEPHONE (OUTPATIENT)
Dept: INTERNAL MEDICINE CLINIC | Age: 67
End: 2022-07-21
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
## 2022-07-29 ENCOUNTER — OFFICE VISIT (OUTPATIENT)
Dept: INTERNAL MEDICINE CLINIC | Age: 67
End: 2022-07-29
Payer: COMMERCIAL

## 2022-07-29 ENCOUNTER — TELEPHONE (OUTPATIENT)
Dept: PHYSICAL THERAPY | Age: 67
End: 2022-07-29

## 2022-07-29 VITALS
DIASTOLIC BLOOD PRESSURE: 70 MMHG | HEART RATE: 59 BPM | HEIGHT: 72 IN | TEMPERATURE: 97.1 F | WEIGHT: 218 LBS | OXYGEN SATURATION: 97 % | BODY MASS INDEX: 29.53 KG/M2 | SYSTOLIC BLOOD PRESSURE: 145 MMHG | RESPIRATION RATE: 16 BRPM

## 2022-07-29 DIAGNOSIS — S39.012A STRAIN OF LUMBAR REGION, INITIAL ENCOUNTER: ICD-10-CM

## 2022-07-29 DIAGNOSIS — S16.1XXA STRAIN OF NECK MUSCLE, INITIAL ENCOUNTER: Primary | ICD-10-CM

## 2022-07-29 PROCEDURE — 99213 OFFICE O/P EST LOW 20 MIN: CPT | Performed by: INTERNAL MEDICINE

## 2022-07-29 PROCEDURE — 1123F ACP DISCUSS/DSCN MKR DOCD: CPT | Performed by: INTERNAL MEDICINE

## 2022-07-29 RX ORDER — ACETAMINOPHEN 500 MG
TABLET ORAL
COMMUNITY

## 2022-07-29 RX ORDER — CYCLOBENZAPRINE HCL 10 MG
10 TABLET ORAL
COMMUNITY
Start: 2022-07-20

## 2022-07-29 NOTE — PROGRESS NOTES
J Luis List presents today for   Chief Complaint   Patient presents with    Hospital Follow Up     Pt saw at [address] [place] [address] on 07/20/2022 for neck pain and back pain     Back Pain     Sharp pain    Leg Pain     Left, dull pain    Neck Pain       Is someone accompanying this pt? No    Is the patient using any DME equipment during OV? No    Depression Screening:  3 most recent PHQ Screens 7/15/2022   Little interest or pleasure in doing things Not at all   Feeling down, depressed, irritable, or hopeless Not at all   Total Score PHQ 2 0       Learning Assessment:  Learning Assessment 2/11/2015   PRIMARY LEARNER Patient   HIGHEST LEVEL OF EDUCATION - PRIMARY LEARNER  -   BARRIERS PRIMARY LEARNER -   CO-LEARNER CAREGIVER -   PRIMARY LANGUAGE ENGLISH   LEARNER PREFERENCE PRIMARY LISTENING   ANSWERED BY patient   RELATIONSHIP SELF       Abuse Screening:  Abuse Screening Questionnaire 5/4/2021   Do you ever feel afraid of your partner? N   Are you in a relationship with someone who physically or mentally threatens you? N   Is it safe for you to go home? Y       Fall Screening  Fall Risk Assessment, last 12 mths 7/15/2022   Able to walk? Yes   Fall in past 12 months? 0   Do you feel unsteady? 0   Are you worried about falling 0       Generalized Anxiety  No flowsheet data found. Health Maintenance Due   Topic Date Due    COVID-19 Vaccine (1) Never done    DTaP/Tdap/Td series (1 - Tdap) Never done    Shingrix Vaccine Age 50> (1 of 2) Never done    Pneumococcal 65+ years (1 - PCV) 10/04/2020   . Health Maintenance reviewed and discussed and ordered per Provider. Vaccines Due   Screenings Due       Aleksandr Malone Sr. is updated on all     1. \"Have you been to the ER, urgent care clinic since your last visit? Hospitalized since your last visit? \"  Pt saw at [facility] on 07/20/2022 for back and neck pain     2.  \"Have you seen or consulted any other health care providers outside of the [address] [place] [address] since your last visit? \" No     3. For patients aged 39-70: Has the patient had a colonoscopy / FIT/ Cologuard?  Yes - no Care Gap present
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
## 2022-08-03 ENCOUNTER — HOSPITAL ENCOUNTER (OUTPATIENT)
Dept: PHYSICAL THERAPY | Age: 67
Discharge: HOME | End: 2022-08-03
Attending: INTERNAL MEDICINE
Payer: COMMERCIAL

## 2022-08-03 PROCEDURE — 97110 THERAPEUTIC EXERCISES: CPT

## 2022-08-03 PROCEDURE — 97535 SELF CARE MNGMENT TRAINING: CPT

## 2022-08-03 PROCEDURE — 97162 PT EVAL MOD COMPLEX 30 MIN: CPT
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
## 2022-08-03 NOTE — PROGRESS NOTES
PT DAILY TREATMENT NOTE     Patient Name: Sheba Mcdonough  : 1955  [x]  Patient  Verified  Payor: BLUE CROSS / Plan:  [facility] / Product Type: PPO /    In time:10:46  Out time:11:35  Total Treatment Time (min): 49  Visit #: 1 of 8    Medicare/BCBS Only   Total Timed Codes (min):  23 1:1 Treatment Time:  49       Treatment Area: Neck pain [M54.2]  Other low back pain [M54.59]    SUBJECTIVE  Pain Level (0-10 scale): 6  Any medication changes, allergies to medications, adverse drug reactions, diagnosis change, or new procedure performed?: [x] No    [] Yes (see summary sheet for update)  Subjective functional status/changes:   [] No changes reported  The pt reports left sided neck and back pain    OBJECTIVE    26 min [x]Eval                  []Re-Eval       10 min Therapeutic Exercise:  [] See flow sheet :   Rationale: increase ROM and increase proprioception to improve the patients ability to perform ADLs    13 min Therapeutic Activity:  []  See flow sheet :   Rationale:  pt education and instruction   to improve the patients ability to self manage symptoms          With   [] TE   [] TA   [] neuro   [] other: Patient Education: [x] Review HEP    [] Progressed/Changed HEP based on:   [] positioning   [] body mechanics   [] transfers   [] heat/ice application    [] other:      Other Objective/Functional Measures:      Pain Level (0-10 scale) post treatment: 7    ASSESSMENT/Changes in Function: see POC    Patient will continue to benefit from skilled PT services to modify and progress therapeutic interventions, address functional mobility deficits, address ROM deficits, address strength deficits, analyze and address soft tissue restrictions, analyze and cue movement patterns, and analyze and modify body mechanics/ergonomics to attain remaining goals.      [x]  See Plan of Care  []  See progress note/recertification  []  See Discharge Summary         Progress towards goals / Updated goals:  Short Term Goals: To be accomplished in 2 weeks:  Pt will demonstrate I and compliance with HEP to maximize therapeutic effect. IE: HEP issued and instructed  Pt will demonstrate cervical rotation to 55 deg B for improved ease of ADLs. IE: 48 deg left 40 deg right  Long Term Goals: To be accomplished in 4 weeks:  Pt will demonstrate lumbar SB to 75% of WNL without neck or back pain to improve ease of dressing. IE: 50% with neck pain > lumbar  Pt will demonstrate trunk flexion fingertips to ankles without pain for improved household chore performance. IE: fingertips to ankles with pain  Pt will demonstrate left hip strength 4+/5 without pain to improve ambulation ability. IE: 4/5 with pain  Pt will report abolishment of LE paresthesias to improve quality of life and ADL ease.   IE: no change    PLAN  []  Upgrade activities as tolerated     [x]  Continue plan of care  []  Update interventions per flow sheet       []  Discharge due to:_  []  Other:_      Gian Huntley DPT CMTPT 8/3/2022  12:37 PM    Future Appointments   Date Time Provider Roxy Urbina   8/9/2022  9:15 AM Ute Elam PTA [facility]   8/11/2022  3:30 PM Lashae Higgins, PTA [facility]   8/16/2022  9:45 AM Lashae Higgins, PTA [facility]   8/18/2022 12:45 PM Sen [address] [facility]   10/14/2022  8:40 AM [facility] LAB VISIT [facility]   10/21/2022  9:20 AM Chaparro Weinstein MD [facility] AMB   11/16/2022  9:00 AM [facility] NURSE [facility] CHARLIE [facility]   11/23/2022  9:00 AM JOHN Arevalo

## 2022-08-09 ENCOUNTER — HOSPITAL ENCOUNTER (OUTPATIENT)
Dept: PHYSICAL THERAPY | Age: 67
Discharge: HOME | End: 2022-08-09
Attending: INTERNAL MEDICINE
Payer: COMMERCIAL

## 2022-08-09 PROCEDURE — 97112 NEUROMUSCULAR REEDUCATION: CPT

## 2022-08-09 PROCEDURE — 97110 THERAPEUTIC EXERCISES: CPT

## 2022-08-09 NOTE — PROGRESS NOTES
PT DAILY TREATMENT NOTE     Patient Name: Arpit Raines.  IONR:[id number]  : 1955  [x]  Patient  Verified  Payor: BLUE CROSS / Plan:  [facility] [place] / Product Type: PPO /    In time:9:32  Out time:10:14  Total Treatment Time (min): 42  Visit #: 2 of 8    Medicare/BCBS Only   Total Timed Codes (min):  32 1:1 Treatment Time:  32       Treatment Area: Neck pain [M54.2]  Other low back pain [M54.59]    SUBJECTIVE  Pain Level (0-10 scale): 6  Any medication changes, allergies to medications, adverse drug reactions, diagnosis change, or new procedure performed?: [x] No    [] Yes (see summary sheet for update)  Subjective functional status/changes:   [] No changes reported  Pt states he has tried the home exercise.  17 min late d/t traffic    OBJECTIVE    Modality rationale: decrease pain to improve the patients ability to perform daily tasks   Min Type Additional Details    [] Estim:  []Unatt       []IFC  []Premod                        []Other:  []w/ice   []w/heat  Position:  Location:    [] Estim: []Att    []TENS instruct  []NMES                    []Other:  []w/US   []w/ice   []w/heat  Position:  Location:    []  Traction: [] Cervical       []Lumbar                       [] Prone          []Supine                       []Intermittent   []Continuous Lbs:  [] before manual  [] after manual    []  Ultrasound: []Continuous   [] Pulsed                           []1MHz   []3MHz W/cm2:  Location:    []  Iontophoresis with dexamethasone         Location: [] Take home patch   [] In clinic   10 []  Ice     [x]  heat  []  Ice massage  []  Laser   []  Anodyne Position: supine with wedge  Location: back and neck    []  Laser with stim  []  Other:  Position:  Location:    []  Vasopneumatic Device    []  Right     []  Left  Pre-treatment girth:  Post-treatment girth:  Measured at (location):  Pressure:       [] lo [] med [] hi   Temperature: [] lo [] med [] hi   [] Skin assessment post-treatment:  []intact []redness- no adverse reaction    []redness - adverse reaction:         24 min Therapeutic Exercise:  [x] See flow sheet :   Rationale: increase ROM and increase strength to improve the patients ability to perform ADLs    8 min Neuromuscular Re-education:  [x]  See flow sheet :   Rationale: increase strength, improve coordination, and increase proprioception  to improve the patients ability to perform functional tasks            With   [] TE   [] TA   [] neuro   [] other: Patient Education: [x] Review HEP    [] Progressed/Changed HEP based on:   [] positioning   [] body mechanics   [] transfers   [] heat/ice application    [] other:      Other Objective/Functional Measures:   First visit after Eval     Pain Level (0-10 scale) post treatment: 5    ASSESSMENT/Changes in Function: Initiated treatment program per flow sheet. Reviewed HEP for understanding and compliance. Treatment we limited today d/t pt late. Demo's left > right neck and back ms tension. Challenged with PPT, req's max vc's for technique. Decr'd pain following treatment. Patient will continue to benefit from skilled PT services to modify and progress therapeutic interventions, address functional mobility deficits, address ROM deficits, address strength deficits, analyze and address soft tissue restrictions, analyze and cue movement patterns, analyze and modify body mechanics/ergonomics, and assess and modify postural abnormalities to attain remaining goals. []  See Plan of Care  []  See progress note/recertification  []  See Discharge Summary         Progress towards goals / Updated goals:  Short Term Goals: To be accomplished in 2 weeks:  Pt will demonstrate I and compliance with HEP to maximize therapeutic effect. IE: HEP issued and instructed  Current: Met, pt reports compliance 8/9/2022  Pt will demonstrate cervical rotation to 55 deg B for improved ease of ADLs. IE: 48 deg left 40 deg right  Long Term Goals:  To be accomplished in 4 weeks:  Pt will demonstrate lumbar SB to 75% of WNL without neck or back pain to improve ease of dressing. IE: 50% with neck pain > lumbar  Pt will demonstrate trunk flexion fingertips to ankles without pain for improved household chore performance. IE: fingertips to ankles with pain  Pt will demonstrate left hip strength 4+/5 without pain to improve ambulation ability. IE: 4/5 with pain  Pt will report abolishment of LE paresthesias to improve quality of life and ADL ease.   IE: no change    PLAN  []  Upgrade activities as tolerated     [x]  Continue plan of care  []  Update interventions per flow sheet       []  Discharge due to:_  []  Other:_      Martin Galindo PTA 8/9/2022  9:34 AM    Future Appointments   Date Time Provider Roxy Negretei   8/11/2022  3:30 PM Nghia Garay PTA [facility]   8/16/2022  9:45 AM Nghia Garay PTA [facility]   8/18/2022 12:45 PM [place], [address] [facility]   10/14/2022  8:40 AM IO LAB VISIT [facility]   10/21/2022  9:20 AM Cassia Smallwood MD [facility] AMB   11/16/2022  9:00 AM [facility] NURSE DESTINEY PEREZ   11/23/2022  9:00 AM JOHN Camacho [address] [facility]

## 2022-08-11 ENCOUNTER — HOSPITAL ENCOUNTER (OUTPATIENT)
Dept: PHYSICAL THERAPY | Age: 67
Discharge: HOME | End: 2022-08-11
Attending: INTERNAL MEDICINE
Payer: COMMERCIAL

## 2022-08-11 PROCEDURE — 97110 THERAPEUTIC EXERCISES: CPT

## 2022-08-11 PROCEDURE — 97112 NEUROMUSCULAR REEDUCATION: CPT

## 2022-08-11 NOTE — PROGRESS NOTES
PT DAILY TREATMENT NOTE     Patient Name: Dami Pickard.  Date:2022  : 1955  [x]  Patient  Verified  Payor: BLUE CROSS / Plan:  [facility] [place] / Product Type: PPO /    In time:3:30  Out time:4:18  Total Treatment Time (min): 48  Visit #: 3 of 8    Medicare/BCBS Only   Total Timed Codes (min):  38 1:1 Treatment Time:  38       Treatment Area: Neck pain [M54.2]  Other low back pain [M54.59]    SUBJECTIVE  Pain Level (0-10 scale): 5/10  Any medication changes, allergies to medications, adverse drug reactions, diagnosis change, or new procedure performed?: [x] No    [] Yes (see summary sheet for update)  Subjective functional status/changes:   [] No changes reported  Pt reports no new complaints of pain. OBJECTIVE    Modality rationale: decrease pain and increase tissue extensibility to improve the patients ability to perform ADLs with increased ease.     Min Type Additional Details    [] Estim:  []Unatt       []IFC  []Premod                        []Other:  []w/ice   []w/heat  Position:  Location:    [] Estim: []Att    []TENS instruct  []NMES                    []Other:  []w/US   []w/ice   []w/heat  Position:  Location:    []  Traction: [] Cervical       []Lumbar                       [] Prone          []Supine                       []Intermittent   []Continuous Lbs:  [] before manual  [] after manual    []  Ultrasound: []Continuous   [] Pulsed                           []1MHz   []3MHz W/cm2:  Location:    []  Iontophoresis with dexamethasone         Location: [] Take home patch   [] In clinic   10 []  Ice     [x]  heat  []  Ice massage  []  Laser   []  Anodyne Position:supine  Location:cervical and lumbar    []  Laser with stim  []  Other:  Position:  Location:    []  Vasopneumatic Device    []  Right     []  Left  Pre-treatment girth:  Post-treatment girth:  Measured at (location):  Pressure:       [] lo [] med [] hi   Temperature: [] lo [] med [] hi   [] Skin assessment post-treatment:  []intact []redness- no adverse reaction    []redness - adverse reaction:     28 min Therapeutic Exercise:  [x] See flow sheet :   Rationale: increase ROM and increase strength to improve the patients ability to perform ADLs with increased ease     10 min Neuromuscular Re-education:  [x]  See flow sheet :   Rationale: increase strength, improve coordination, and increase proprioception  to improve the patients ability to perform ADLs with increased ease. With   [] TE   [] TA   [] neuro   [] other: Patient Education: [x] Review HEP    [] Progressed/Changed HEP based on:   [] positioning   [] body mechanics   [] transfers   [] heat/ice application    [] other:      Other Objective/Functional Measures: Added Bike/UBE, doorway stretch and Theraband exercises as per flow sheet. Pain Level (0-10 scale) post treatment: 2/10    ASSESSMENT/Changes in Function: Pt demonstrates good ability to correct form and carry over with vc's for form. Pt has no adverse reaction to treatment. Patient will continue to benefit from skilled PT services to modify and progress therapeutic interventions, address functional mobility deficits, address ROM deficits, address strength deficits, analyze and address soft tissue restrictions, analyze and cue movement patterns, and analyze and modify body mechanics/ergonomics to attain remaining goals. []  See Plan of Care  []  See progress note/recertification  []  See Discharge Summary         Progress towards goals / Updated goals:  Short Term Goals: To be accomplished in 2 weeks:  Pt will demonstrate I and compliance with HEP to maximize therapeutic effect. IE: HEP issued and instructed  Current: Met, pt reports compliance 8/9/2022  Pt will demonstrate cervical rotation to 55 deg B for improved ease of ADLs. IE: 48 deg left 40 deg right  Long Term Goals:  To be accomplished in 4 weeks:  Pt will demonstrate lumbar SB to 75% of WNL without neck or back pain to improve ease of dressing. IE: 50% with neck pain > lumbar  Pt will demonstrate trunk flexion fingertips to ankles without pain for improved household chore performance. IE: fingertips to ankles with pain  Pt will demonstrate left hip strength 4+/5 without pain to improve ambulation ability. IE: 4/5 with pain  Pt will report abolishment of LE paresthesias to improve quality of life and ADL ease.   IE: no change    PLAN  []  Upgrade activities as tolerated     [x]  Continue plan of care  []  Update interventions per flow sheet       []  Discharge due to:_  []  Other:_      Joao Atkinson PTA 8/11/2022  3:40 PM    Future Appointments   Date Time Provider Roxy Ciara   8/16/2022  9:45 AM Daniella Mcmullen PTA [facility]   8/18/2022 12:45 PM Sen [address] [facility]   10/14/2022  8:40 AM [facility] LAB VISIT [facility]   10/21/2022  9:20 AM Juliocesar Ramires MD [facility]   11/16/2022  9:00 AM [facility] NURSE [facility] CHARLIE [facility]   11/23/2022  9:00 AM JOHN Whitman

## 2022-08-16 ENCOUNTER — APPOINTMENT (OUTPATIENT)
Dept: PHYSICAL THERAPY | Age: 67
End: 2022-08-16
Attending: INTERNAL MEDICINE
Payer: COMMERCIAL

## 2022-08-18 ENCOUNTER — HOSPITAL ENCOUNTER (OUTPATIENT)
Dept: PHYSICAL THERAPY | Age: 67
Discharge: HOME | End: 2022-08-18
Attending: INTERNAL MEDICINE
Payer: COMMERCIAL

## 2022-08-18 PROCEDURE — 97112 NEUROMUSCULAR REEDUCATION: CPT

## 2022-08-18 PROCEDURE — 97110 THERAPEUTIC EXERCISES: CPT

## 2022-08-18 PROCEDURE — 97140 MANUAL THERAPY 1/> REGIONS: CPT

## 2022-08-18 NOTE — PROGRESS NOTES
PT DAILY TREATMENT NOTE     Patient Name: Mirna Patel.  BMDL:  : 1955  [x]  Patient  Verified  Payor: BLUE CROSS / Plan:  [facility] [place] / Product Type: PPO /    In time:12:42  Out time:1:37  Total Treatment Time (min): 55  Visit #: 4 of 8    Medicare/BCBS Only   Total Timed Codes (min):  45 1:1 Treatment Time:  45       Treatment Area: Neck pain [M54.2]  Other low back pain [M54.59]    SUBJECTIVE  Pain Level (0-10 scale): 6-7  Any medication changes, allergies to medications, adverse drug reactions, diagnosis change, or new procedure performed?: [x] No    [] Yes (see summary sheet for update)  Subjective functional status/changes:   [] No changes reported  The pt reports that he was carrying groceries in yesterday when the bags got caught and jerked him to the left.  He reports pain through left hip and lumbar region and his neck is sore on the left     OBJECTIVE    Modality rationale: decrease pain and increase tissue extensibility to improve the patients ability to perform ADLs with less pain   Min Type Additional Details    [] Estim:  []Unatt       []IFC  []Premod                        []Other:  []w/ice   []w/heat  Position:  Location:    [] Estim: []Att    []TENS instruct  []NMES                    []Other:  []w/US   []w/ice   []w/heat  Position:  Location:    []  Traction: [] Cervical       []Lumbar                       [] Prone          []Supine                       []Intermittent   []Continuous Lbs:  [] before manual  [] after manual    []  Ultrasound: []Continuous   [] Pulsed                           []1MHz   []3MHz W/cm2:  Location:    []  Iontophoresis with dexamethasone         Location: [] Take home patch   [] In clinic   10 []  Ice     [x]  heat  []  Ice massage  []  Laser   []  Anodyne Position: supine with wedge  Location: C/S and L/S    []  Laser with stim  []  Other:  Position:  Location:    []  Vasopneumatic Device    []  Right     [] Left  Pre-treatment girth:  Post-treatment girth:  Measured at (location):  Pressure:       [] lo [] med [] hi   Temperature: [] lo [] med [] hi   [] Skin assessment post-treatment:  []intact []redness- no adverse reaction    []redness - adverse reaction:     22 min Therapeutic Exercise:  [] See flow sheet :   Rationale: increase ROM and increase strength to improve the patients ability to perform daily tasks      15 min Neuromuscular Re-education:  []  See flow sheet :   Rationale: increase ROM, increase strength, and increase proprioception  to improve the patients ability to perform functional tasks with improved mechanics and efficiency    8 min Manual Therapy:  STM left cervical paraspinals and scalenes, segmental glides left to right upper cervical spine   The manual therapy interventions were performed at a separate and distinct time from the therapeutic activities interventions. Rationale: decrease pain, increase ROM, and increase tissue extensibility to perform daily tasks             With   [] TE   [] TA   [] neuro   [] other: Patient Education: [x] Review HEP    [] Progressed/Changed HEP based on:   [] positioning   [] body mechanics   [] transfers   [] heat/ice application    [] other:      Other Objective/Functional Measures:      Pain Level (0-10 scale) post treatment: 6    ASSESSMENT/Changes in Function: The pt reports a bit more pain today from yesterday's incident. Able to complete flowsheet without increase in pain. Continue with mobility focus as pt does seem to respond well to first few sessions. Patient will continue to benefit from skilled PT services to modify and progress therapeutic interventions, address functional mobility deficits, address ROM deficits, address strength deficits, analyze and address soft tissue restrictions, analyze and cue movement patterns, and analyze and modify body mechanics/ergonomics to attain remaining goals.      []  See Plan of Care  []  See progress note/recertification  []  See Discharge Summary         Progress towards goals / Updated goals:  Short Term Goals: To be accomplished in 2 weeks:  Pt will demonstrate I and compliance with HEP to maximize therapeutic effect. IE: HEP issued and instructed  Current: Met, pt reports compliance 8/9/2022  Pt will demonstrate cervical rotation to 55 deg B for improved ease of ADLs. IE: 48 deg left 40 deg right  Current: partially met 57 deg left 46 deg right 8/18/2022  Long Term Goals: To be accomplished in 4 weeks:  Pt will demonstrate lumbar SB to 75% of WNL without neck or back pain to improve ease of dressing. IE: 50% with neck pain > lumbar  Pt will demonstrate trunk flexion fingertips to ankles without pain for improved household chore performance. IE: fingertips to ankles with pain  Pt will demonstrate left hip strength 4+/5 without pain to improve ambulation ability. IE: 4/5 with pain  Pt will report abolishment of LE paresthesias to improve quality of life and ADL ease.   IE: no change    PLAN  [x]  Upgrade activities as tolerated     []  Continue plan of care  []  Update interventions per flow sheet       []  Discharge due to:_  []  Other:_      Reinaldo Blackwell DPT CMTPT 8/18/2022  12:43 PM    Future Appointments   Date Time Provider Roxy Ciara   8/18/2022 12:45 PM Sen [address] [facility]   8/19/2022  9:45 AM Roya Rogers PTA [facility]   10/14/2022  8:40 AM [facility] LAB VISIT [facility] AMB   10/21/2022  9:20 AM Edwin Freedman MD [facility] AMB   11/16/2022  9:00 AM [facility]   11/23/2022  9:00 AM JOHN Jenkins [address] [facility]

## 2022-08-19 ENCOUNTER — HOSPITAL ENCOUNTER (OUTPATIENT)
Dept: PHYSICAL THERAPY | Age: 67
Discharge: HOME | End: 2022-08-19
Attending: INTERNAL MEDICINE
Payer: COMMERCIAL

## 2022-08-19 PROCEDURE — 97110 THERAPEUTIC EXERCISES: CPT

## 2022-08-19 PROCEDURE — 97112 NEUROMUSCULAR REEDUCATION: CPT

## 2022-08-19 NOTE — PROGRESS NOTES
PT DAILY TREATMENT NOTE     Patient Name: Misbah Richardson.  ZNCQ:[id number]  : 1955  [x]  Patient  Verified  Payor: BLUE CROSS / Plan: [facility] [place] / Product Type: PPO /    In time:9:45  Out time:10:33  Total Treatment Time (min): 48  Visit #: 5 of 8    Medicare/BCBS Only   Total Timed Codes (min):  38 1:1 Treatment Time:  38       Treatment Area: Neck pain [M54.2]  Other low back pain [M54.59]    SUBJECTIVE  Pain Level (0-10 scale): 5/10  Any medication changes, allergies to medications, adverse drug reactions, diagnosis change, or new procedure performed?: [x] No    [] Yes (see summary sheet for update)  Subjective functional status/changes:   [] No changes reported  Pt reports he is feeling better since his appointment yesterday. OBJECTIVE    Modality rationale: decrease pain and increase tissue extensibility to improve the patients ability to perform ADLs with increased ease.     Min Type Additional Details    [] Estim:  []Unatt       []IFC  []Premod                        []Other:  []w/ice   []w/heat  Position:  Location:    [] Estim: []Att    []TENS instruct  []NMES                    []Other:  []w/US   []w/ice   []w/heat  Position:  Location:    []  Traction: [] Cervical       []Lumbar                       [] Prone          []Supine                       []Intermittent   []Continuous Lbs:  [] before manual  [] after manual    []  Ultrasound: []Continuous   [] Pulsed                           []1MHz   []3MHz W/cm2:  Location:    []  Iontophoresis with dexamethasone         Location: [] Take home patch   [] In clinic   10 []  Ice     [x]  heat  []  Ice massage  []  Laser   []  Anodyne Position:  Location: cervical and lumbar    []  Laser with stim  []  Other:  Position:  Location:    []  Vasopneumatic Device    []  Right     []  Left  Pre-treatment girth:  Post-treatment girth:  Measured at (location):  Pressure:       [] lo [] med [] hi   Temperature: [] lo [] med [] hi   [] Skin assessment post-treatment:  []intact []redness- no adverse reaction    []redness - adverse reaction:       28 min Therapeutic Exercise:  [x] See flow sheet :   Rationale: increase ROM and increase strength to improve the patients ability to perform ADLs      10 min Neuromuscular Re-education:  [x]  See flow sheet :   Rationale: increase strength, improve coordination, and increase proprioception  to improve the patients ability to perform ADLs with increased ease. With   [] TE   [] TA   [] neuro   [] other: Patient Education: [x] Review HEP    [] Progressed/Changed HEP based on:   [] positioning   [] body mechanics   [] transfers   [] heat/ice application    [] other:      Other Objective/Functional Measures:      Pain Level (0-10 scale) post treatment: 4/10    ASSESSMENT/Changes in Function: Pt has continued decreased pain post treatment; Pt demonstrates fair tolerance to therapeutic exercises. Patient will continue to benefit from skilled PT services to modify and progress therapeutic interventions, address functional mobility deficits, address ROM deficits, address strength deficits, analyze and address soft tissue restrictions, analyze and cue movement patterns, analyze and modify body mechanics/ergonomics, and assess and modify postural abnormalities to attain remaining goals. []  See Plan of Care  []  See progress note/recertification  []  See Discharge Summary         Progress towards goals / Updated goals:  Short Term Goals: To be accomplished in 2 weeks:  Pt will demonstrate I and compliance with HEP to maximize therapeutic effect. IE: HEP issued and instructed  Current: Met, pt reports compliance 8/9/2022  Pt will demonstrate cervical rotation to 55 deg B for improved ease of ADLs. IE: 48 deg left 40 deg right  Current: partially met 57 deg left 46 deg right 8/18/2022  Long Term Goals:  To be accomplished in 4 weeks:  Pt will demonstrate lumbar SB to 75% of WNL without neck or back pain to improve ease of dressing. IE: 50% with neck pain > lumbar  Pt will demonstrate trunk flexion fingertips to ankles without pain for improved household chore performance. IE: fingertips to ankles with pain  Pt will demonstrate left hip strength 4+/5 without pain to improve ambulation ability. IE: 4/5 with pain  Pt will report abolishment of LE paresthesias to improve quality of life and ADL ease.   IE: no change    PLAN  []  Upgrade activities as tolerated     [x]  Continue plan of care  []  Update interventions per flow sheet       []  Discharge due to:_  []  Other:_      Lalita Miner, ALONSO 8/19/2022  9:54 AM    Future Appointments   Date Time Provider Roxy Urbina   10/14/2022  8:40 AM [facility] LAB VISIT [facility] AMB   10/21/2022  9:20 AM Edwin Freedman MD [facility] BS AMB   11/16/2022  9:00 AM [facility] NURSE Damian Player CHARLIE SCHED   11/23/2022  9:00 AM JOHN Jenkins [address] [facility]

## 2022-08-24 ENCOUNTER — HOSPITAL ENCOUNTER (OUTPATIENT)
Dept: PHYSICAL THERAPY | Age: 67
Discharge: HOME | End: 2022-08-24
Attending: INTERNAL MEDICINE
Payer: COMMERCIAL

## 2022-08-24 PROCEDURE — 97110 THERAPEUTIC EXERCISES: CPT

## 2022-08-24 PROCEDURE — 97112 NEUROMUSCULAR REEDUCATION: CPT

## 2022-08-24 NOTE — PROGRESS NOTES
PT DAILY TREATMENT NOTE     Patient Name: Janice Mendoza.  Date:2022  : 1955  [x]  Patient  Verified  Payor: BLUE CROSS / Plan:  [facility] [place] / Product Type: PPO /    In time:11:45  Out time:12:35  Total Treatment Time (min): 50  Visit #: 6 of 8    Medicare/BCBS Only   Total Timed Codes (min):  40 1:1 Treatment Time:  40       Treatment Area: Neck pain [M54.2]  Other low back pain [M54.59]    SUBJECTIVE  Pain Level (0-10 scale): 5/10  Any medication changes, allergies to medications, adverse drug reactions, diagnosis change, or new procedure performed?: [x] No    [] Yes (see summary sheet for update)  Subjective functional status/changes:   [] No changes reported  Pt reports he is feeling better and has had no flare ups since last visit. OBJECTIVE    Modality rationale: decrease inflammation and decrease pain to improve the patients ability to perform ADLs with increased ease.     Min Type Additional Details    [] Estim:  []Unatt       []IFC  []Premod                        []Other:  []w/ice   []w/heat  Position:  Location:    [] Estim: []Att    []TENS instruct  []NMES                    []Other:  []w/US   []w/ice   []w/heat  Position:  Location:    []  Traction: [] Cervical       []Lumbar                       [] Prone          []Supine                       []Intermittent   []Continuous Lbs:  [] before manual  [] after manual    []  Ultrasound: []Continuous   [] Pulsed                           []1MHz   []3MHz W/cm2:  Location:    []  Iontophoresis with dexamethasone         Location: [] Take home patch   [] In clinic    []  Ice     []  heat  []  Ice massage  []  Laser   []  Anodyne Position:  Location:    []  Laser with stim  []  Other:  Position:  Location:    []  Vasopneumatic Device    []  Right     []  Left  Pre-treatment girth:  Post-treatment girth:  Measured at (location):  Pressure:       [] lo [] med [] hi   Temperature: [] lo [] med [] hi   [] Skin assessment post-treatment:  []intact []redness- no adverse reaction    []redness - adverse reaction:     30 min Therapeutic Exercise:  [x] See flow sheet :   Rationale: increase ROM and increase strength to improve the patients ability to perform ADLs with increased ease. 10 min Neuromuscular Re-education:  [x]  See flow sheet :   Rationale: increase strength, improve coordination, and increase proprioception  to improve the patients ability to perform functional activities with increased ease. With   [] TE   [] TA   [] neuro   [] other: Patient Education: [x] Review HEP    [] Progressed/Changed HEP based on:   [] positioning   [] body mechanics   [] transfers   [] heat/ice application    [] other:      Other Objective/Functional Measures: Added half prone hip extension, supine SLR with PPT and bridges. Pain Level (0-10 scale) post treatment: 5/10    ASSESSMENT/Changes in Function: Pt continues to make good progress toward goals with no pain when performing trunk flexion reaching fingertips to ankles. Patient will continue to benefit from skilled PT services to modify and progress therapeutic interventions, address functional mobility deficits, address ROM deficits, address strength deficits, analyze and address soft tissue restrictions, analyze and cue movement patterns, and analyze and modify body mechanics/ergonomics to attain remaining goals. []  See Plan of Care  []  See progress note/recertification  []  See Discharge Summary         Progress towards goals / Updated goals:  Short Term Goals: To be accomplished in 2 weeks:  Pt will demonstrate I and compliance with HEP to maximize therapeutic effect. IE: HEP issued and instructed  Current: Met, pt reports compliance 8/9/2022  Pt will demonstrate cervical rotation to 55 deg B for improved ease of ADLs. IE: 48 deg left 40 deg right  Current: partially met 57 deg left 46 deg right 8/18/2022  Long Term Goals:  To be accomplished in 4 weeks:  Pt will demonstrate lumbar SB to 75% of WNL without neck or back pain to improve ease of dressing. IE: 50% with neck pain > lumbar  Pt will demonstrate trunk flexion fingertips to ankles without pain for improved household chore performance. IE: fingertips to ankles with pain  Current: Met; no pain with fingertips to ankles. 08/24/2022  Pt will demonstrate left hip strength 4+/5 without pain to improve ambulation ability. IE: 4/5 with pain  Pt will report abolishment of LE paresthesias to improve quality of life and ADL ease. IE: no change  Current: Pt reports less frequency with paresthesias.  08/24/2022    PLAN  []  Upgrade activities as tolerated     [x]  Continue plan of care  []  Update interventions per flow sheet       []  Discharge due to:_  []  Other:_      Nhi Mishra PTA 8/24/2022  11:52 AM    Future Appointments   Date Time Provider Roxy Ciara   8/30/2022 12:45 PM [place], [address] [facility]   9/1/2022  9:45 AM [facility]   10/14/2022  8:40 AM IO LAB VISIT IO BS AMB   10/21/2022  9:20 AM Bobby Higgins MD [facility] BS AMB   11/16/2022  9:00 AM [facility] NURSE [facility] CHARLIE [facility]   11/23/2022  9:00 AM JOHN Serrato [address] [facility]

## 2022-08-25 ENCOUNTER — APPOINTMENT (OUTPATIENT)
Dept: PHYSICAL THERAPY | Age: 67
End: 2022-08-25
Attending: INTERNAL MEDICINE
Payer: COMMERCIAL

## 2022-08-30 ENCOUNTER — HOSPITAL ENCOUNTER (OUTPATIENT)
Dept: PHYSICAL THERAPY | Age: 67
Discharge: HOME | End: 2022-08-30
Attending: INTERNAL MEDICINE
Payer: COMMERCIAL

## 2022-08-30 PROCEDURE — 97112 NEUROMUSCULAR REEDUCATION: CPT

## 2022-08-30 PROCEDURE — 97110 THERAPEUTIC EXERCISES: CPT

## 2022-08-30 PROCEDURE — 97140 MANUAL THERAPY 1/> REGIONS: CPT

## 2022-08-30 NOTE — PROGRESS NOTES
PT DAILY TREATMENT NOTE     Patient Name: Misbah Richardson.  Date:2022  : 1955  [x]  Patient  Verified  Payor: BLUE CROSS / Plan:  [facility] [place] / Product Type: PPO /    In time:12:46  Out time:1:27  Total Treatment Time (min): 41  Visit #: 7 of 8    Medicare/BCBS Only   Total Timed Codes (min):  41 1:1 Treatment Time:  41       Treatment Area: Neck pain [M54.2]  Other low back pain [M54.59]    SUBJECTIVE  Pain Level (0-10 scale): 5  Any medication changes, allergies to medications, adverse drug reactions, diagnosis change, or new procedure performed?: [x] No    [] Yes (see summary sheet for update)  Subjective functional status/changes:   [] No changes reported  The pt reports that he had some buzzing sensation in his anterior thigh and 1 instance of posterior sciatic type pain over the weekend. Those sensations are gone by yesterday though.     OBJECTIVE    Modality rationale: PD  to improve the patients ability to    Min Type Additional Details    [] Estim:  []Unatt       []IFC  []Premod                        []Other:  []w/ice   []w/heat  Position:  Location:    [] Estim: []Att    []TENS instruct  []NMES                    []Other:  []w/US   []w/ice   []w/heat  Position:  Location:    []  Traction: [] Cervical       []Lumbar                       [] Prone          []Supine                       []Intermittent   []Continuous Lbs:  [] before manual  [] after manual    []  Ultrasound: []Continuous   [] Pulsed                           []1MHz   []3MHz W/cm2:  Location:    []  Iontophoresis with dexamethasone         Location: [] Take home patch   [] In clinic    []  Ice     []  heat  []  Ice massage  []  Laser   []  Anodyne Position:  Location:    []  Laser with stim  []  Other:  Position:  Location:    []  Vasopneumatic Device    []  Right     []  Left  Pre-treatment girth:  Post-treatment girth:  Measured at (location):  Pressure:       [] lo [] med [] hi   Temperature: [] lo [] med [] hi   [] Skin assessment post-treatment:  []intact []redness- no adverse reaction    []redness - adverse reaction:       23 min Therapeutic Exercise:  [] See flow sheet :   Rationale: increase ROM and increase strength to improve the patients ability to perform daily tasks     10 min Neuromuscular Re-education:  []  See flow sheet :   Rationale: increase ROM, increase strength, and increase proprioception  to improve the patients ability to perform functional tasks with improved efficiency    8 min Manual Therapy:  STM left cervical paraspinals,SOR, segmental glides left to right upper cervical spine with passive rotation/SB   The manual therapy interventions were performed at a separate and distinct time from the therapeutic activities interventions. Rationale: decrease pain, increase ROM, and increase tissue extensibility to improve ease of ADLs            With   [] TE   [] TA   [] neuro   [] other: Patient Education: [x] Review HEP    [] Progressed/Changed HEP based on:   [] positioning   [] body mechanics   [] transfers   [] heat/ice application    [] other:      Other Objective/Functional Measures: 50 deg right rotation,  55 deg left     Pain Level (0-10 scale) post treatment: 3-4    ASSESSMENT/Changes in Function: The pt has progressed well overall with mobility measures. He does continue to report pain levels 5/10 but states he feels better when participating in PT. Pt would benefit from continuance of PT to attempt further mobility and pain improvements and ease of ADLs    Patient will continue to benefit from skilled PT services to modify and progress therapeutic interventions, address functional mobility deficits, address ROM deficits, address strength deficits, analyze and address soft tissue restrictions, analyze and cue movement patterns, and analyze and modify body mechanics/ergonomics to attain remaining goals.      []  See Plan of Care  [x]  See progress note/recertification  []  See Discharge Summary         Progress towards goals / Updated goals:  Short Term Goals: To be accomplished in 2 weeks:  Pt will demonstrate I and compliance with HEP to maximize therapeutic effect. IE: HEP issued and instructed  Current: Met, pt reports compliance 8/9/2022  Pt will demonstrate cervical rotation to 55 deg B for improved ease of ADLs. IE: 48 deg left 40 deg right  Current: partially met 57 deg left 46 deg right 8/18/2022   Long Term Goals: To be accomplished in 4 weeks:  Pt will demonstrate lumbar SB to 75% of WNL without neck or back pain to improve ease of dressing. IE: 50% with neck pain > lumbar  Current: progressing 75% each with left sided lumbar pain only 8/30/2022  Pt will demonstrate trunk flexion fingertips to ankles without pain for improved household chore performance. IE: fingertips to ankles with pain  Current: Met; no pain with fingertips to ankles. 08/24/2022  Pt will demonstrate left hip strength 4+/5 without pain to improve ambulation ability. IE: 4/5 with pain  Pt will report abolishment of LE paresthesias to improve quality of life and ADL ease. IE: no change  Current: Pt reports less frequency with paresthesias.  08/24/2022    PLAN  [x]  Upgrade activities as tolerated     []  Continue plan of care  []  Update interventions per flow sheet       []  Discharge due to:_  []  Other:_      Corlis Hatchet DPT CMTPT 8/30/2022  12:49 PM    Future Appointments   Date Time Provider Roxy Urbina   9/1/2022  9:45 AM [facility]   10/14/2022  8:40 AM IO LAB VISIT IO BS AMB   10/21/2022  9:20 AM Efra Rowe MD [facility] BS AMB   11/16/2022  9:00 AM [facility] NURSE [facility] CHARLIE [facility]   11/23/2022  9:00 AM JOHN Francis

## 2022-09-01 ENCOUNTER — HOSPITAL ENCOUNTER (OUTPATIENT)
Dept: PHYSICAL THERAPY | Age: 67
Discharge: HOME | End: 2022-09-01
Attending: INTERNAL MEDICINE
Payer: COMMERCIAL

## 2022-09-01 PROCEDURE — 97110 THERAPEUTIC EXERCISES: CPT

## 2022-09-01 PROCEDURE — 97140 MANUAL THERAPY 1/> REGIONS: CPT

## 2022-09-01 PROCEDURE — 97112 NEUROMUSCULAR REEDUCATION: CPT

## 2022-09-01 NOTE — PROGRESS NOTES
PT DAILY TREATMENT NOTE 10-18    Patient Name: Calvin Caceres.  RTHV:1850  : 1955  [x]  Patient  Verified  Payor: BLUE CROSS / Plan:  [facility] [place] / Product Type: PPO /    In time:948  Out time:1034  Total Treatment Time (min): 46  Visit #: 8 of 8    Medicare/BCBS Only   Total Timed Codes (min):  46 1:1 Treatment Time:  46       Treatment Area: Neck pain [M54.2]  Other low back pain [M54.59]    SUBJECTIVE  Pain Level (0-10 scale): 6  Any medication changes, allergies to medications, adverse drug reactions, diagnosis change, or new procedure performed?: [x] No    [] Yes (see summary sheet for update)  Subjective functional status/changes:   [x] No changes reported      OBJECTIVE    28 min Therapeutic Exercise:  [] See flow sheet :   Rationale: increase ROM and increase strength to improve the patients ability to perform daily activities      10 min Neuromuscular Re-education:  [x]  See flow sheet :   Rationale: increase ROM and increase strength  to improve the patients ability to perform daily activities with improved efficiency     8 min Manual Therapy:   STM left cervical paraspinals,SOR, segmental glides left to right upper cervical spine with passive rotation/SB   The manual therapy interventions were performed at a separate and distinct time from the therapeutic activities interventions. Rationale: decrease pain, increase ROM, and increase tissue extensibility to perform daily activities   With   [] TE   [] TA   [] neuro   [] other: Patient Education: [x] Review HEP    [] Progressed/Changed HEP based on:   [] positioning   [] body mechanics   [] transfers   [] heat/ice application    [] other:      Other Objective/Functional Measures: FOTO 46     Pain Level (0-10 scale) post treatment: 4    ASSESSMENT/Changes in Function: ROM is slowly improving; FOTO has also improved by 5 points.  Patient continues to have mm restrictions in left C/S paraspinals and left glute/piriformis and subjective pain reports in same areas. Patient will benefit from continuing with PT to further progress towards goals. Patient will continue to benefit from skilled PT services to modify and progress therapeutic interventions, address functional mobility deficits, address ROM deficits, address strength deficits, analyze and address soft tissue restrictions, and analyze and cue movement patterns to attain remaining goals. []  See Plan of Care  [x]  See progress note/recertification  []  See Discharge Summary         Progress towards goals / Updated goals:  Short Term Goals: To be accomplished in 2 weeks:  Pt will demonstrate I and compliance with HEP to maximize therapeutic effect. IE: HEP issued and instructed  Current: Met, pt reports compliance  Pt will demonstrate cervical rotation to 55 deg B for improved ease of ADLs. IE: 48 deg left 40 deg right  Current: partially met 57 deg left 46 deg right   Long Term Goals: To be accomplished in 4 weeks:  Pt will demonstrate lumbar SB to 75% of WNL without neck or back pain to improve ease of dressing. IE: 50% with neck pain > lumbar  Current: progressing 75% each with left sided lumbar pain only   Pt will demonstrate trunk flexion fingertips to ankles without pain for improved household chore performance. IE: fingertips to ankles with pain  Current: Met; no pain with fingertips to ankles. Pt will demonstrate left hip strength 4+/5 without pain to improve ambulation ability. IE: 4/5 with pain  Current:left hip flexor 4+/5; left abd  and ext 4/5 with slight pain   Pt will report abolishment of LE paresthesias to improve quality of life and ADL ease. IE: no change  Current: Pt reports less frequency with paresthesias.      PLAN  [x]  Upgrade activities as tolerated     []  Continue plan of care  []  Update interventions per flow sheet       []  Discharge due to:_  []  Other:_      Edgardo Trejo, REBECCA, CMTPT 9/1/2022  8:40 AM    Future Appointments   Date Time Provider Roxy Urbina   9/1/2022  9:45 AM Junaid ARMANDO, PT [facility]   9/6/2022 12:45 PM Glennda Simmering [facility]   9/7/2022  7:00 AM Glennda Simmering [facility]   9/13/2022  9:45 AM Katiana Cheng, PTA [facility]   9/15/2022  9:45 AM Katiana Cheng, PTA [facility]   9/20/2022  9:45 AM Glennda Simmering [facility]   9/22/2022 10:30 AM Glennda Simmering [facility]   9/27/2022  9:45 AM Glennda Simmering [facility]   9/29/2022 10:30 AM Katiana Cheng, PTA [facility]   10/14/2022  8:40 AM [facility] LAB VISIT [facility] BS AMB   10/21/2022  9:20 AM Emelia Lepe MD [facility] BS AMB   11/16/2022  9:00 AM [facility] NURSE [facility] CHARLIE [facility]   11/23/2022  9:00 AM JOHN Yu [address] [facility]
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
## 2022-09-06 ENCOUNTER — HOSPITAL ENCOUNTER (OUTPATIENT)
Dept: PHYSICAL THERAPY | Age: 67
Discharge: HOME | End: 2022-09-06
Attending: INTERNAL MEDICINE
Payer: COMMERCIAL

## 2022-09-06 PROCEDURE — 97110 THERAPEUTIC EXERCISES: CPT

## 2022-09-06 PROCEDURE — 97112 NEUROMUSCULAR REEDUCATION: CPT

## 2022-09-06 PROCEDURE — 97140 MANUAL THERAPY 1/> REGIONS: CPT

## 2022-09-06 NOTE — PROGRESS NOTES
PT DAILY TREATMENT NOTE     Patient Name: Lashanda Saldana.  Date:2022  : 1955  [x]  Patient  Verified  Payor: BLUE CROSS / Plan:  [facility] / Product Type: PPO /    In time:12:43  Out time:1:22  Total Treatment Time (min): 39  Visit #: 1 of 8    Medicare/BCBS Only   Total Timed Codes (min):  39 1:1 Treatment Time:  39       Treatment Area: Neck pain [M54.2]  Other low back pain [M54.59]    SUBJECTIVE  Pain Level (0-10 scale): 5-6  Any medication changes, allergies to medications, adverse drug reactions, diagnosis change, or new procedure performed?: [x] No    [] Yes (see summary sheet for update)  Subjective functional status/changes:   [] No changes reported  The pt reports that he was mostly sore in his neck this weekend.  He reports back/LE pain essentially abolished at this time    OBJECTIVE    Modality rationale: PD  to improve the patients ability to    Min Type Additional Details    [] Estim:  []Unatt       []IFC  []Premod                        []Other:  []w/ice   []w/heat  Position:  Location:    [] Estim: []Att    []TENS instruct  []NMES                    []Other:  []w/US   []w/ice   []w/heat  Position:  Location:    []  Traction: [] Cervical       []Lumbar                       [] Prone          []Supine                       []Intermittent   []Continuous Lbs:  [] before manual  [] after manual    []  Ultrasound: []Continuous   [] Pulsed                           []1MHz   []3MHz W/cm2:  Location:    []  Iontophoresis with dexamethasone         Location: [] Take home patch   [] In clinic    []  Ice     []  heat  []  Ice massage  []  Laser   []  Anodyne Position:  Location:    []  Laser with stim  []  Other:  Position:  Location:    []  Vasopneumatic Device    []  Right     []  Left  Pre-treatment girth:  Post-treatment girth:  Measured at (location):  Pressure:       [] lo [] med [] hi   Temperature: [] lo [] med [] hi   [] Skin assessment post-treatment:  []intact []redness- no adverse reaction    []redness - adverse reaction:       17 min Therapeutic Exercise:  [] See flow sheet :   Rationale: increase ROM and increase strength to improve the patients ability to perform daily tasks        12 min Neuromuscular Re-education:  []  See flow sheet :   Rationale: increase ROM, increase strength, and increase proprioception  to improve the patients ability to perform daily tasks with improved efficiency and mechanics    10 min Manual Therapy:  upper cervical side glides left to right, STM scalenes left, passive SB and rotation left emphasis    The manual therapy interventions were performed at a separate and distinct time from the therapeutic activities interventions. Rationale: decrease pain, increase ROM, and increase tissue extensibility to perform ADLs with increased ease            With   [] TE   [] TA   [] neuro   [] other: Patient Education: [x] Review HEP    [] Progressed/Changed HEP based on:   [] positioning   [] body mechanics   [] transfers   [] heat/ice application    [] other:      Other Objective/Functional Measures:      Pain Level (0-10 scale) post treatment: 3-4    ASSESSMENT/Changes in Function: Pt continues to demonstrate some upper cervical mobility restrictions on left compared to right. May attempt some combo/e-stim in upcoming visits to attempt some muscle tension reduction. Advised pt to focus on some cervical mobility work with HEP and he reports he does plan to perform more of this at home    Patient will continue to benefit from skilled PT services to modify and progress therapeutic interventions, address functional mobility deficits, address ROM deficits, address strength deficits, analyze and address soft tissue restrictions, analyze and cue movement patterns, and analyze and modify body mechanics/ergonomics to attain remaining goals.      []  See Plan of Care  []  See progress note/recertification  []  See Discharge Summary         Progress towards goals / Updated goals:  Updated Goals: to be achieved in 4 weeks:  Pt will demonstrate cervical rotation to 55 deg B for improved ease of ADLs. PN: partially met 57 deg left 46 deg right   2. Pt will demonstrate lumbar SB to 75% of WNL without neck or back pain to improve ease of dressing. PN: progressing 75% each with left sided lumbar pain only   3. Pt will demonstrate left hip strength 4+/5 without pain to improve ambulation ability. PN:left hip flexor 4+/5; left abd  and ext 4/5 with slight pain   4. Pt will report abolishment of LE paresthesias to improve quality of life and ADL ease. PN: Pt reports less frequency with paresthesias.    Current: Met- pt reports abolishment of LE paresthesias 9/6/2022    PLAN  [x]  Upgrade activities as tolerated     []  Continue plan of care  []  Update interventions per flow sheet       []  Discharge due to:_  []  Other:_      Madelyn Kelsey DPT CMTPT 9/6/2022  12:43 PM    Future Appointments   Date Time Provider Roxy Urbina   9/6/2022 12:45 PM Sen [facility] [facility]   9/7/2022  7:00 AM Yann Feliciano [facility]   9/13/2022  9:45 AM Leonardo Lopez PTA [facility]   9/15/2022  9:45 AM Leonardo Lopez PTA [facility]   9/20/2022  9:45 AM Yann Feliciano [facility]   9/22/2022 10:30 AM Yann Feliciano [facility]   9/27/2022  9:45 AM Sen [employer] [facility]   9/29/2022 10:30 AM Leonardo Lopez PTA [facility]   10/14/2022  8:40 AM [facility] LAB VISIT [facility] BS AMB   10/21/2022  9:20 AM German Valdez MD [facility] BS AMB   11/16/2022  9:00 AM [facility] NURSE DESTINEY PEREZ   11/23/2022  9:00 AM JOHN Blanchard

## 2022-09-07 ENCOUNTER — HOSPITAL ENCOUNTER (OUTPATIENT)
Dept: PHYSICAL THERAPY | Age: 67
Discharge: HOME | End: 2022-09-07
Attending: INTERNAL MEDICINE
Payer: COMMERCIAL

## 2022-09-07 PROCEDURE — 97112 NEUROMUSCULAR REEDUCATION: CPT

## 2022-09-07 PROCEDURE — 97110 THERAPEUTIC EXERCISES: CPT

## 2022-09-07 PROCEDURE — 97140 MANUAL THERAPY 1/> REGIONS: CPT

## 2022-09-07 NOTE — PROGRESS NOTES
PT DAILY TREATMENT NOTE     Patient Name: Carlene Villalba.  GXTO:[id number]  : 1955  [x]  Patient  Verified  Payor: BLUE CROSS / Plan: [employer] [facility] / Product Type: PPO /    In time:10:30  Out time:11:17  Total Treatment Time (min): 47  Visit #: 2 of 8    Medicare/BCBS Only   Total Timed Codes (min):  47 1:1 Treatment Time:  47       Treatment Area: Neck pain [M54.2]  Other low back pain [M54.59]    SUBJECTIVE  Pain Level (0-10 scale): 4  Any medication changes, allergies to medications, adverse drug reactions, diagnosis change, or new procedure performed?: [x] No    [] Yes (see summary sheet for update)  Subjective functional status/changes:   [] No changes reported  Pt reports most of his discomfort is in his neck but it is getting better. OBJECTIVE      24 min Therapeutic Exercise:  [x] See flow sheet :   Rationale: increase ROM and increase strength to improve the patients ability to perform functional task with ease. 15 min Neuromuscular Re-education:  [x]  See flow sheet :   Rationale: increase strength, improve coordination, and increase proprioception  to improve the patients ability to perform ADL's with ease. 8 min Manual Therapy:   STM left cervical paraspinals,SOR, segmental glides left to right upper cervical spine with passive rotation/SB   The manual therapy interventions were performed at a separate and distinct time from the therapeutic activities interventions. Rationale: decrease pain, increase ROM, increase tissue extensibility, and decrease trigger points to improve functional mobility with ADL's.       With   [] TE   [] TA   [] neuro   [] other: Patient Education: [x] Review HEP    [] Progressed/Changed HEP based on:   [] positioning   [] body mechanics   [] transfers   [] heat/ice application    [] other:      Other Objective/Functional Measures:   Nod and lift- 10x3\"     Pain Level (0-10 scale) post treatment: 0    ASSESSMENT/Changes in Function:  Cueing to maintain control with exercises. Pt reports no increased pain with therex. Nod and lift initiated with pt able to perform as prescribed with no increased pain. Continued tightness noted in the left C/S and left levator that ease following manual. Pt reports no pain post treatment. Patient will continue to benefit from skilled PT services to modify and progress therapeutic interventions, address functional mobility deficits, address ROM deficits, address strength deficits, analyze and address soft tissue restrictions, analyze and cue movement patterns, analyze and modify body mechanics/ergonomics, and assess and modify postural abnormalities to attain remaining goals. [x]  See Plan of Care  []  See progress note/recertification  []  See Discharge Summary         Progress towards goals / Updated goals:  Updated Goals: to be achieved in 4 weeks:  Pt will demonstrate cervical rotation to 55 deg B for improved ease of ADLs. PN: partially met 57 deg left 46 deg right   2. Pt will demonstrate lumbar SB to 75% of WNL without neck or back pain to improve ease of dressing. PN: progressing 75% each with left sided lumbar pain only   3. Pt will demonstrate left hip strength 4+/5 without pain to improve ambulation ability. PN:left hip flexor 4+/5; left abd  and ext 4/5 with slight pain   4. Pt will report abolishment of LE paresthesias to improve quality of life and ADL ease. PN: Pt reports less frequency with paresthesias.    Current: Met- pt reports abolishment of LE paresthesias 9/6/2022    PLAN  []  Upgrade activities as tolerated     [x]  Continue plan of care  []  Update interventions per flow sheet       []  Discharge due to:_  []  Other:_      Rubin Holman PTA 9/7/2022  10:29 AM    Future Appointments   Date Time Provider Roxy Urbina   9/7/2022 10:30 AM Edie Osborn PTA [facility]   9/13/2022  9:45 AM [facility]   9/15/2022  9:45 AM Joce Flaherty Brady Aburto, PTA [facility]   9/20/2022  9:45 AM Cameron Seymour [facility]   9/22/2022 10:30 AM Cameron Seymour [facility]   9/27/2022  9:45 AM Sen [address] [facility]   9/29/2022 10:30 AM Eliana Styles PTA [facility]   10/14/2022  8:40 AM IO LAB VISIT [facility] BS AMB   10/21/2022  9:20 AM Chelsi Arango MD [facility] BS AMB   11/16/2022  9:00 AM [facility] NURSE [facility] CHARLIE [facility]   11/23/2022  9:00 AM JOHN Ambrose

## 2022-09-13 ENCOUNTER — HOSPITAL ENCOUNTER (OUTPATIENT)
Dept: PHYSICAL THERAPY | Age: 67
Discharge: HOME | End: 2022-09-13
Attending: INTERNAL MEDICINE
Payer: COMMERCIAL

## 2022-09-13 PROCEDURE — 97140 MANUAL THERAPY 1/> REGIONS: CPT

## 2022-09-13 PROCEDURE — 97110 THERAPEUTIC EXERCISES: CPT

## 2022-09-13 PROCEDURE — 97112 NEUROMUSCULAR REEDUCATION: CPT

## 2022-09-13 NOTE — PROGRESS NOTES
PT DAILY TREATMENT NOTE     Patient Name: Sheba Crouch.  MSSD:[id number]  : 1955  [x]  Patient  Verified  Payor: JEYSON FLORES / Plan: Luana Ceron / Product Type: PPO /    In time:9:45  Out time:10:23  Total Treatment Time (min): 38  Visit #: 3 of 8    Medicare/BCBS Only   Total Timed Codes (min):  38 1:1 Treatment Time:  38       Treatment Area: Neck pain [M54.2]  Other low back pain [M54.59]    SUBJECTIVE  Pain Level (0-10 scale): 4/10  Any medication changes, allergies to medications, adverse drug reactions, diagnosis change, or new procedure performed?: [x] No    [] Yes (see summary sheet for update)  Subjective functional status/changes:   [] No changes reported  Pt reports he is doing well and his pain is getting better. OBJECTIVE    20 min Therapeutic Exercise:  [x] See flow sheet :   Rationale: increase ROM and increase strength to improve the patients ability to perform ADLs with increased ease. 10 min Neuromuscular Re-education:  [x]  See flow sheet :   Rationale: increase strength and improve coordination  to improve the patients ability to perform ADLs with increased ease. 8 min Manual Therapy:  Pt in supine: SOR, STM to bilateral UT/LS and c/s paraspinals, C/S PROM. The manual therapy interventions were performed at a separate and distinct time from the therapeutic activities interventions. Rationale: decrease pain, increase ROM, and increase tissue extensibility to improve tolerance to ADLs. With   [] TE   [] TA   [] neuro   [] other: Patient Education: [x] Review HEP    [] Progressed/Changed HEP based on:   [] positioning   [] body mechanics   [] transfers   [] heat/ice application    [] other:      Other Objective/Functional Measures:      Pain Level (0-10 scale) post treatment: 0/10    ASSESSMENT/Changes in Function: Pt has continued improved c/s mobility and significant improvement in function.  Pt has slight pain and difficulty negotiating stairs. Patient will continue to benefit from skilled PT services to modify and progress therapeutic interventions, address functional mobility deficits, address ROM deficits, address strength deficits, analyze and address soft tissue restrictions, analyze and cue movement patterns, and analyze and modify body mechanics/ergonomics to attain remaining goals. []  See Plan of Care  []  See progress note/recertification  []  See Discharge Summary         Progress towards goals / Updated goals:  Updated Goals: to be achieved in 4 weeks:  Pt will demonstrate cervical rotation to 55 deg B for improved ease of ADLs. PN: partially met 57 deg left 46 deg right   2. Pt will demonstrate lumbar SB to 75% of WNL without neck or back pain to improve ease of dressing. PN: progressing 75% each with left sided lumbar pain only   3. Pt will demonstrate left hip strength 4+/5 without pain to improve ambulation ability. PN:left hip flexor 4+/5; left abd  and ext 4/5 with slight pain   4. Pt will report abolishment of LE paresthesias to improve quality of life and ADL ease. PN: Pt reports less frequency with paresthesias.    Current: Met- pt reports abolishment of LE paresthesias 9/6/2022    PLAN  []  Upgrade activities as tolerated     [x]  Continue plan of care  []  Update interventions per flow sheet       []  Discharge due to:_  []  Other:_      Bee Zamarripa PTA 9/13/2022  9:46 AM    Future Appointments   Date Time Provider Roxy Urbina   9/15/2022  9:45 AM [facility]   9/20/2022  9:45 AM [place], [address] [facility]   9/22/2022 10:30 AM Sen, [address] [facility]   9/27/2022  9:45 AM Sen, [address] [facility]   9/29/2022 10:30 AM Tony Camejo PTA [facility]   10/14/2022  8:40 AM IO LAB VISIT [facility] BS AMB   10/21/2022  9:20 AM Eyal Houston MD [facility] BS AMB   11/16/2022  9:00 AM [facility] NURSE [place] CHARLIE PEREZ   11/23/2022  9:00 AM JOHN Rangel [address] David Diggs

## 2022-09-15 ENCOUNTER — HOSPITAL ENCOUNTER (OUTPATIENT)
Dept: PHYSICAL THERAPY | Age: 67
Discharge: HOME | End: 2022-09-15
Attending: INTERNAL MEDICINE
Payer: COMMERCIAL

## 2022-09-15 PROCEDURE — 97110 THERAPEUTIC EXERCISES: CPT

## 2022-09-15 PROCEDURE — 97112 NEUROMUSCULAR REEDUCATION: CPT

## 2022-09-15 NOTE — PROGRESS NOTES
PT DAILY TREATMENT NOTE     Patient Name: Sabine Aguilar.  XLIP:[id number]  : 1955  [x]  Patient  Verified  Payor: BLUE CROSS / Plan: [employer] [facility] [place] / Product Type: PPO /    In time:9:45  Out time:10:25  Total Treatment Time (min): 40  Visit #: 4 of 8    Medicare/BCBS Only   Total Timed Codes (min):  40 1:1 Treatment Time:  40       Treatment Area: Neck pain [M54.2]  Other low back pain [M54.59]    SUBJECTIVE  Pain Level (0-10 scale): 4/10  Any medication changes, allergies to medications, adverse drug reactions, diagnosis change, or new procedure performed?: [x] No    [] Yes (see summary sheet for update)  Subjective functional status/changes:   [] No changes reported  Pt reports his neck and back are bothering him today. OBJECTIVE  32 min Therapeutic Exercise:  [x] See flow sheet :   Rationale: increase ROM and increase strength to improve the patients ability to perform ADLs with increased ease. 10 min Neuromuscular Re-education:  [x]  See flow sheet :   Rationale: increase strength, improve coordination, and increase proprioception  to improve the patients ability to perform ADLs with increased ease. With   [] TE   [] TA   [] neuro   [] other: Patient Education: [x] Review HEP    [] Progressed/Changed HEP based on:   [] positioning   [] body mechanics   [] transfers   [] heat/ice application    [] other:      Other Objective/Functional Measures: Added exercises as per flow sheet. Pain Level (0-10 scale) post treatment: 0/10    ASSESSMENT/Changes in Function: Pt continues to have improved AROM trunk and c/s mobility but has reports of left sided neck pain with right trunk side bend.     Patient will continue to benefit from skilled PT services to modify and progress therapeutic interventions, address functional mobility deficits, address ROM deficits, address strength deficits, analyze and address soft tissue restrictions, analyze and cue movement patterns, analyze and modify body mechanics/ergonomics, and assess and modify postural abnormalities to attain remaining goals. []  See Plan of Care  []  See progress note/recertification  []  See Discharge Summary         Progress towards goals / Updated goals:  Updated Goals: to be achieved in 4 weeks:  Pt will demonstrate cervical rotation to 55 deg B for improved ease of ADLs. PN: partially met 57 deg left 46 deg right   2. Pt will demonstrate lumbar SB to 75% of WNL without neck or back pain to improve ease of dressing. PN: progressing 75% each with left sided lumbar pain only   3. Pt will demonstrate left hip strength 4+/5 without pain to improve ambulation ability. PN:left hip flexor 4+/5; left abd  and ext 4/5 with slight pain   4. Pt will report abolishment of LE paresthesias to improve quality of life and ADL ease. PN: Pt reports less frequency with paresthesias.    Current: Met- pt reports abolishment of LE paresthesias 9/6/2022    PLAN  []  Upgrade activities as tolerated     [x]  Continue plan of care  []  Update interventions per flow sheet       []  Discharge due to:_  []  Other:_      Marie Sauer PTA 9/15/2022  9:50 AM    Future Appointments   Date Time Provider Roxy Ciara   9/20/2022  9:45 AM Sen, [address] [facility]   9/22/2022 10:30 AM Sen, [address] [facility]   9/27/2022  9:45 AM Sen, [address] [facility]   9/29/2022 10:30 AM Patrick Gaona PTA [facility]   10/14/2022  8:40 AM IO LAB VISIT [facility] BS AMB   10/21/2022  9:20 AM Miriam Bucio MD [facility] BS AMB   11/16/2022  9:00 AM [facility] NURSE [facility] CHARLIE [facility]   11/23/2022  9:00 AM JOHN Odell

## 2022-09-20 ENCOUNTER — HOSPITAL ENCOUNTER (OUTPATIENT)
Dept: PHYSICAL THERAPY | Age: 67
Discharge: HOME | End: 2022-09-20
Attending: INTERNAL MEDICINE
Payer: COMMERCIAL

## 2022-09-20 PROCEDURE — 97032 APPL MODALITY 1+ESTIM EA 15: CPT

## 2022-09-20 PROCEDURE — 97112 NEUROMUSCULAR REEDUCATION: CPT

## 2022-09-20 PROCEDURE — 97110 THERAPEUTIC EXERCISES: CPT

## 2022-09-20 NOTE — PROGRESS NOTES
PT DAILY TREATMENT NOTE     Patient Name: Kelley Prince.  Date:2022  : 1955  [x]  Patient  Verified  Payor: BLUE CROSS / Plan:  [facility] [employer] / Product Type: PPO /    In time:9:45  Out time:10:28  Total Treatment Time (min): 43  Visit #: 5 of 8    Medicare/BCBS Only   Total Timed Codes (min):  43 1:1 Treatment Time:  43       Treatment Area: Neck pain [M54.2]  Other low back pain [M54.59]    SUBJECTIVE  Pain Level (0-10 scale): 4  Any medication changes, allergies to medications, adverse drug reactions, diagnosis change, or new procedure performed?: [x] No    [] Yes (see summary sheet for update)  Subjective functional status/changes:   [] No changes reported  The pt reports some irritation in his left knee today (an old problem) Reports more hip region pain on left recently and still left cervical discomfort    OBJECTIVE    Modality rationale: decrease pain and increase tissue extensibility to improve the patients ability to perform daily tasks    Min Type Additional Details    [] Estim:  []Unatt       []IFC  []Premod                        []Other:  []w/ice   []w/heat  Position:  Location:   6+2 [x] Estim: [x]Att    []TENS instruct  []NMES                    []Other:  [x]w/US   []w/ice   []w/heat  Position: seated  Location: left scalenes/paraspinals    []  Traction: [] Cervical       []Lumbar                       [] Prone          []Supine                       []Intermittent   []Continuous Lbs:  [] before manual  [] after manual    []  Ultrasound: []Continuous   [] Pulsed                           []1MHz   []3MHz W/cm2:  Location:    []  Iontophoresis with dexamethasone         Location: [] Take home patch   [] In clinic    []  Ice     []  heat  []  Ice massage  []  Laser   []  Anodyne Position:  Location:    []  Laser with stim  []  Other:  Position:  Location:    []  Vasopneumatic Device    []  Right     []  Left  Pre-treatment girth:  Post-treatment girth:  Measured at (location):  Pressure:       [] lo [] med [] hi   Temperature: [] lo [] med [] hi   [] Skin assessment post-treatment:  []intact []redness- no adverse reaction    []redness - adverse reaction:     15 min Therapeutic Exercise:  [] See flow sheet :   Rationale: increase ROM and increase strength to improve the patients ability to perform daily tasks     12 min Neuromuscular Re-education:  []  See flow sheet :   Rationale: increase ROM, increase strength, and increase proprioception  to improve the patients ability to perform functional tasks with improved mechanics     8 min Manual Therapy:  segmental glides left to right upper c/s, passive SB and rotation cervical spine with glides to C4   The manual therapy interventions were performed at a separate and distinct time from the therapeutic activities interventions. Rationale: decrease pain, increase ROM, and increase tissue extensibility to improve ease of ADL performance             With   [] TE   [] TA   [] neuro   [] other: Patient Education: [x] Review HEP    [] Progressed/Changed HEP based on:   [] positioning   [] body mechanics   [] transfers   [] heat/ice application    [] other:      Other Objective/Functional Measures:      Pain Level (0-10 scale) post treatment: 3    ASSESSMENT/Changes in Function: The pt demonstrates improved cervical rotation AROM today without pain reports. He reports improved hip pain and denies back pain at end of session reports more left knee pain which is not part of treatment plan. Assess carryover to combo and progress with hip/lumbar stability    Patient will continue to benefit from skilled PT services to modify and progress therapeutic interventions, address functional mobility deficits, address ROM deficits, address strength deficits, analyze and address soft tissue restrictions, analyze and cue movement patterns, and analyze and modify body mechanics/ergonomics to attain remaining goals.      []  See Plan of Care  []  See progress note/recertification  []  See Discharge Summary         Progress towards goals / Updated goals:  Updated Goals: to be achieved in 4 weeks:  Pt will demonstrate cervical rotation to 55 deg B for improved ease of ADLs. PN: partially met 57 deg left 46 deg right   Current: Met 57 deg left 55 deg right 9/20/2022  2. Pt will demonstrate lumbar SB to 75% of WNL without neck or back pain to improve ease of dressing. PN: progressing 75% each with left sided lumbar pain only   3. Pt will demonstrate left hip strength 4+/5 without pain to improve ambulation ability. PN:left hip flexor 4+/5; left abd  and ext 4/5 with slight pain   4. Pt will report abolishment of LE paresthesias to improve quality of life and ADL ease. PN: Pt reports less frequency with paresthesias.    Current: Met- pt reports abolishment of LE paresthesias 9/6/2022    PLAN  [x]  Upgrade activities as tolerated     []  Continue plan of care  []  Update interventions per flow sheet       []  Discharge due to:_  []  Other:_      Alvina Hobson DPT CMTPT   9/20/2022  9:47 AM    Future Appointments   Date Time Provider Roxy Urbina   9/22/2022 10:30 AM Sen, [facility] [address] [facility]   9/27/2022  9:45 AM [place], [facility] [address] [facility]   9/29/2022 10:30 AM Samuel Martin [facility]   10/14/2022  8:40 AM IO LAB VISIT [facility] BS AMB   10/21/2022  9:20 AM Haseeb Piña MD [facility] BS AMB   11/16/2022  9:00 AM [facility] NURSE DESTINEY GUERRA [facility]   11/23/2022  9:00 AM JOHN Stern [address] [facility]

## 2022-09-22 ENCOUNTER — APPOINTMENT (OUTPATIENT)
Dept: PHYSICAL THERAPY | Age: 67
End: 2022-09-22
Attending: INTERNAL MEDICINE
Payer: COMMERCIAL

## 2022-09-27 ENCOUNTER — HOSPITAL ENCOUNTER (OUTPATIENT)
Dept: PHYSICAL THERAPY | Age: 67
Discharge: HOME | End: 2022-09-27
Attending: INTERNAL MEDICINE
Payer: COMMERCIAL

## 2022-09-27 PROCEDURE — 97032 APPL MODALITY 1+ESTIM EA 15: CPT

## 2022-09-27 PROCEDURE — 97112 NEUROMUSCULAR REEDUCATION: CPT

## 2022-09-27 PROCEDURE — 97110 THERAPEUTIC EXERCISES: CPT

## 2022-09-27 NOTE — PROGRESS NOTES
PT DAILY TREATMENT NOTE     Patient Name: Callum Nieto Sr.  Date:2022  : 1955  [x]  Patient  Verified  Payor: BLUE CROSS / Plan:  [facility] / Product Type: PPO /    In time:9:42  Out time:10:25  Total Treatment Time (min): 43  Visit #: 6 of 8    Medicare/BCBS Only   Total Timed Codes (min):  43 1:1 Treatment Time:  43       Treatment Area: Neck pain [M54.2]  Other low back pain [M54.59]    SUBJECTIVE  Pain Level (0-10 scale): 3  Any medication changes, allergies to medications, adverse drug reactions, diagnosis change, or new procedure performed?: [x] No    [] Yes (see summary sheet for update)  Subjective functional status/changes:   [] No changes reported  The pt reports just some left knee > hip discomfort today    OBJECTIVE    Modality rationale: increase tissue extensibility to improve the patients ability to perform ADLs    Min Type Additional Details    [] Estim:  []Unatt       []IFC  []Premod                        []Other:  []w/ice   []w/heat  Position:  Location:   5+3 [x] Estim: [x]Att    []TENS instruct  []NMES                    []Other:  [x]w/US   []w/ice   []w/heat  Position: seated  Location: left c/s paraspinals & scalenes    []  Traction: [] Cervical       []Lumbar                       [] Prone          []Supine                       []Intermittent   []Continuous Lbs:  [] before manual  [] after manual    []  Ultrasound: []Continuous   [] Pulsed                           []1MHz   []3MHz W/cm2:  Location:    []  Iontophoresis with dexamethasone         Location: [] Take home patch   [] In clinic    []  Ice     []  heat  []  Ice massage  []  Laser   []  Anodyne Position:  Location:    []  Laser with stim  []  Other:  Position:  Location:    []  Vasopneumatic Device    []  Right     []  Left  Pre-treatment girth:  Post-treatment girth:  Measured at (location):  Pressure:       [] lo [] med [] hi   Temperature: [] lo [] med [] hi   [] Skin assessment post-treatment:  []intact []redness- no adverse reaction    []redness - adverse reaction:         17 min Therapeutic Exercise:  [] See flow sheet :   Rationale: increase ROM and increase strength to improve the patients ability to perform daily tasks     10 min Neuromuscular Re-education:  []  See flow sheet :   Rationale: increase ROM, increase strength, and increase proprioception  to improve the patients ability to perform functional tasks with improved mechanics and stability    8 min Manual Therapy:  segmental mobs left to right C4 region, passive SB and rotation stretching c/s   The manual therapy interventions were performed at a separate and distinct time from the therapeutic activities interventions. Rationale: increase ROM and increase tissue extensibility to improve ease of self care            With   [] TE   [] TA   [] neuro   [] other: Patient Education: [x] Review HEP    [] Progressed/Changed HEP based on:   [] positioning   [] body mechanics   [] transfers   [] heat/ice application    [] other:      Other Objective/Functional Measures:      Pain Level (0-10 scale) post treatment: 2    ASSESSMENT/Changes in Function: The pt demonstrates continued cervical pain relief with some mild segmental limitations into SB left at this point. He reports mild hip discomfort on the left that does improve after exercise. Possible brief continuation after this week to attempt further hip pain reduction prior to D/C    Patient will continue to benefit from skilled PT services to modify and progress therapeutic interventions, address functional mobility deficits, address ROM deficits, address strength deficits, analyze and address soft tissue restrictions, analyze and cue movement patterns, and analyze and modify body mechanics/ergonomics to attain remaining goals.      []  See Plan of Care  []  See progress note/recertification  []  See Discharge Summary         Progress towards goals / Updated goals:  Updated Goals: to be achieved in 4 weeks:  Pt will demonstrate cervical rotation to 55 deg B for improved ease of ADLs. PN: partially met 57 deg left 46 deg right   Current: Met 57 deg left 55 deg right 9/20/2022  2. Pt will demonstrate lumbar SB to 75% of WNL without neck or back pain to improve ease of dressing. PN: progressing 75% each with left sided lumbar pain only   Current: 75% noted with mild left QL discomfort when moving right 9/27/2022  3. Pt will demonstrate left hip strength 4+/5 without pain to improve ambulation ability. PN:left hip flexor 4+/5; left abd  and ext 4/5 with slight pain   4. Pt will report abolishment of LE paresthesias to improve quality of life and ADL ease. PN: Pt reports less frequency with paresthesias.    Current: Met- pt reports abolishment of LE paresthesias 9/6/2022    PLAN  [x]  Upgrade activities as tolerated     []  Continue plan of care  []  Update interventions per flow sheet       []  Discharge due to:_  []  Other:_      Dolly Medina DPT CMTPT 9/27/2022  9:44 AM    Future Appointments   Date Time Provider Roxy Ciara   9/27/2022  9:45 AM Sen, [address] [facility]   9/29/2022 10:30 [facility]   10/5/2022  9:00 AM [facility]   10/14/2022  8:40 AM [facility] LAB VISIT [facility] BS AMB   10/21/2022  9:20 AM Hipolito Rhoades MD [facility] BS AMB   11/16/2022  9:00 AM [facility] NURSE [facility] CHARLIE [facility]   11/23/2022  9:00 AM MEY Gomez

## 2022-09-29 ENCOUNTER — HOSPITAL ENCOUNTER (OUTPATIENT)
Dept: PHYSICAL THERAPY | Age: 67
Discharge: HOME | End: 2022-09-29
Attending: INTERNAL MEDICINE
Payer: COMMERCIAL

## 2022-09-29 PROCEDURE — 97110 THERAPEUTIC EXERCISES: CPT

## 2022-09-29 PROCEDURE — 97112 NEUROMUSCULAR REEDUCATION: CPT

## 2022-09-29 PROCEDURE — 97140 MANUAL THERAPY 1/> REGIONS: CPT

## 2022-09-29 NOTE — PROGRESS NOTES
PT DAILY TREATMENT NOTE     Patient Name: Kell Pennington.  Date:2022  : 1955  [x]  Patient  Verified  Payor: BLUE CROSS / Plan:  [facility] [place] [facility] / Product Type: PPO /    In time:10:28  Out time:11:20  Total Treatment Time (min): 52  Visit #: 7 of 8    Medicare/BCBS Only   Total Timed Codes (min):  42 1:1 Treatment Time:  42       Treatment Area: Neck pain [M54.2]  Other low back pain [M54.59]    SUBJECTIVE  Pain Level (0-10 scale): 3-410  Any medication changes, allergies to medications, adverse drug reactions, diagnosis change, or new procedure performed?: [x] No    [] Yes (see summary sheet for update)  Subjective functional status/changes:   [] No changes reported  Pt reports his pain is getting better. OBJECTIVE    Modality rationale: decrease pain and increase tissue extensibility to improve the patients ability to perform ADLs with increased ease.     Min Type Additional Details    [] Estim:  []Unatt       []IFC  []Premod                        []Other:  []w/ice   []w/heat  Position:  Location:    [] Estim: []Att    []TENS instruct  []NMES                    []Other:  []w/US   []w/ice   []w/heat  Position:  Location:    []  Traction: [] Cervical       []Lumbar                       [] Prone          []Supine                       []Intermittent   []Continuous Lbs:  [] before manual  [] after manual    []  Ultrasound: []Continuous   [] Pulsed                           []1MHz   []3MHz W/cm2:  Location:    []  Iontophoresis with dexamethasone         Location: [] Take home patch   [] In clinic   10 []  Ice     [x]  heat  []  Ice massage  []  Laser   []  Anodyne Position:supine  Location:c/s    []  Laser with stim  []  Other:  Position:  Location:    []  Vasopneumatic Device    []  Right     []  Left  Pre-treatment girth:  Post-treatment girth:  Measured at (location):  Pressure:       [] lo [] med [] hi   Temperature: [] lo [] med [] hi   [] Skin assessment post-treatment: []intact []redness- no adverse reaction    []redness - adverse reaction:      24 min Therapeutic Exercise:  [x] See flow sheet :   Rationale: increase ROM and increase strength to improve the patients ability to perform ADLs with increased ease. 10 min Neuromuscular Re-education:  [x]  See flow sheet :   Rationale: increase strength, improve coordination, and increase proprioception  to improve the patients ability to perform ADLs with increased ease. 8 min Manual Therapy:  PROM to C/S: SB, rotation   The manual therapy interventions were performed at a separate and distinct time from the therapeutic activities interventions. Rationale: decrease pain, increase ROM, and increase tissue extensibility to improve tolerance to ADLs. With   [] TE   [] TA   [] neuro   [] other: Patient Education: [x] Review HEP    [] Progressed/Changed HEP based on:   [] positioning   [] body mechanics   [] transfers   [] heat/ice application    [] other:      Other Objective/Functional Measures:      Pain Level (0-10 scale) post treatment: 0/10    ASSESSMENT/Changes in Function: Pt demonstrates improved hip strength. Pt has slight restrictions through c/s today but has relief of stiffness and increased mobility post manual treatment. Patient will continue to benefit from skilled PT services to modify and progress therapeutic interventions, address functional mobility deficits, address ROM deficits, address strength deficits, analyze and address soft tissue restrictions, analyze and cue movement patterns, analyze and modify body mechanics/ergonomics, and assess and modify postural abnormalities to attain remaining goals. []  See Plan of Care  []  See progress note/recertification  []  See Discharge Summary         Progress towards goals / Updated goals:  Updated Goals: to be achieved in 4 weeks:  Pt will demonstrate cervical rotation to 55 deg B for improved ease of ADLs.   PN: partially met 57 deg left 46 deg right   Current: Met 57 deg left 55 deg right 9/20/2022  2. Pt will demonstrate lumbar SB to 75% of WNL without neck or back pain to improve ease of dressing. PN: progressing 75% each with left sided lumbar pain only   Current: 75% noted with mild left QL discomfort when moving right 9/27/2022  3. Pt will demonstrate left hip strength 4+/5 without pain to improve ambulation ability. PN:left hip flexor 4+/5; left abd  and ext 4/5 with slight pain   4. Pt will report abolishment of LE paresthesias to improve quality of life and ADL ease. PN: Pt reports less frequency with paresthesias.    Current: Met- pt reports abolishment of LE paresthesias 9/6/2022       PLAN  []  Upgrade activities as tolerated     [x]  Continue plan of care  []  Update interventions per flow sheet       []  Discharge due to:_  []  Other:_      Solitario Ball PTA 9/29/2022  10:39 AM    Future Appointments   Date Time Provider Roxy Urbina   10/5/2022  9:00 AM Nikki Howard PTA [id number]   10/14/2022  8:40 AM [facility] LAB VISIT [facility] AMB   10/21/2022  9:20 AM Viviana Chirinos MD [facility] BS AMB   11/16/2022  9:00 AM [facility] NURSE [address] [place] [address]   11/23/2022  9:00 AM Estrella Pimentel PA-C [address] [place] [address]

## 2022-10-05 ENCOUNTER — HOSPITAL ENCOUNTER (OUTPATIENT)
Dept: PHYSICAL THERAPY | Age: 67
Discharge: HOME | End: 2022-10-05
Attending: INTERNAL MEDICINE
Payer: COMMERCIAL

## 2022-10-05 PROCEDURE — 97112 NEUROMUSCULAR REEDUCATION: CPT

## 2022-10-05 PROCEDURE — 97110 THERAPEUTIC EXERCISES: CPT
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
## 2022-10-14 ENCOUNTER — APPOINTMENT (OUTPATIENT)
Dept: INTERNAL MEDICINE CLINIC | Age: 67
End: 2022-10-14

## 2022-10-14 ENCOUNTER — HOSPITAL ENCOUNTER (OUTPATIENT)
Dept: LAB | Age: 67
Discharge: HOME | End: 2022-10-14
Payer: COMMERCIAL

## 2022-10-14 DIAGNOSIS — E55.9 VITAMIN D DEFICIENCY: ICD-10-CM

## 2022-10-14 DIAGNOSIS — I10 ESSENTIAL HYPERTENSION: ICD-10-CM

## 2022-10-14 DIAGNOSIS — R73.03 PREDIABETES: ICD-10-CM

## 2022-10-14 LAB
25(OH)D3 SERPL-MCNC: 48.4 NG/ML (ref 30–100)
ALBUMIN SERPL-MCNC: 3.9 G/DL (ref 3.4–5)
ALBUMIN/GLOB SERPL: 1.1 {RATIO} (ref 0.8–1.7)
ALP SERPL-CCNC: 96 U/L (ref 45–117)
ALT SERPL-CCNC: 43 U/L (ref 16–61)
ANION GAP SERPL CALC-SCNC: 4 MMOL/L (ref 3–18)
AST SERPL-CCNC: 25 U/L (ref 10–38)
BILIRUB SERPL-MCNC: 0.5 MG/DL (ref 0.2–1)
BUN SERPL-MCNC: 12 MG/DL (ref 7–18)
BUN/CREAT SERPL: 12 (ref 12–20)
CALCIUM SERPL-MCNC: 10.3 MG/DL (ref 8.5–10.1)
CHLORIDE SERPL-SCNC: 108 MMOL/L (ref 100–111)
CHOLEST SERPL-MCNC: 184 MG/DL
CO2 SERPL-SCNC: 30 MMOL/L (ref 21–32)
CREAT SERPL-MCNC: 0.99 MG/DL (ref 0.6–1.3)
GLOBULIN SER CALC-MCNC: 3.5 G/DL (ref 2–4)
GLUCOSE SERPL-MCNC: 103 MG/DL (ref 74–99)
HBA1C MFR BLD: 6.3 % (ref 4.2–5.6)
HDLC SERPL-MCNC: 42 MG/DL (ref 40–60)
HDLC SERPL: 4.4 {RATIO} (ref 0–5)
LDLC SERPL CALC-MCNC: 124 MG/DL (ref 0–100)
LIPID PROFILE,FLP: ABNORMAL
POTASSIUM SERPL-SCNC: 4.2 MMOL/L (ref 3.5–5.5)
PROT SERPL-MCNC: 7.4 G/DL (ref 6.4–8.2)
SODIUM SERPL-SCNC: 142 MMOL/L (ref 136–145)
TRIGL SERPL-MCNC: 90 MG/DL (ref ?–150)
VLDLC SERPL CALC-MCNC: 18 MG/DL

## 2022-10-14 PROCEDURE — 80053 COMPREHEN METABOLIC PANEL: CPT

## 2022-10-14 PROCEDURE — 83036 HEMOGLOBIN GLYCOSYLATED A1C: CPT

## 2022-10-14 PROCEDURE — 80061 LIPID PANEL: CPT

## 2022-10-14 PROCEDURE — 36415 COLL VENOUS BLD VENIPUNCTURE: CPT

## 2022-10-14 PROCEDURE — 82306 VITAMIN D 25 HYDROXY: CPT

## 2022-10-21 ENCOUNTER — OFFICE VISIT (OUTPATIENT)
Dept: INTERNAL MEDICINE CLINIC | Age: 67
End: 2022-10-21
Payer: COMMERCIAL

## 2022-10-21 VITALS
HEIGHT: 72 IN | SYSTOLIC BLOOD PRESSURE: 144 MMHG | DIASTOLIC BLOOD PRESSURE: 82 MMHG | WEIGHT: 216 LBS | TEMPERATURE: 98.9 F | BODY MASS INDEX: 29.26 KG/M2 | RESPIRATION RATE: 20 BRPM | HEART RATE: 59 BPM | OXYGEN SATURATION: 97 %

## 2022-10-21 DIAGNOSIS — I10 ESSENTIAL HYPERTENSION: Primary | ICD-10-CM

## 2022-10-21 DIAGNOSIS — Z23 ENCOUNTER FOR IMMUNIZATION: ICD-10-CM

## 2022-10-21 DIAGNOSIS — K50.10 CROHN'S DISEASE OF COLON WITHOUT COMPLICATION (HCC): ICD-10-CM

## 2022-10-21 DIAGNOSIS — E78.5 DYSLIPIDEMIA: ICD-10-CM

## 2022-10-21 DIAGNOSIS — R73.03 PREDIABETES: ICD-10-CM

## 2022-10-21 DIAGNOSIS — I48.11 LONGSTANDING PERSISTENT ATRIAL FIBRILLATION (HCC): ICD-10-CM

## 2022-10-21 DIAGNOSIS — E55.9 VITAMIN D DEFICIENCY: ICD-10-CM

## 2022-10-21 PROCEDURE — 90694 VACC AIIV4 NO PRSRV 0.5ML IM: CPT | Performed by: INTERNAL MEDICINE

## 2022-10-21 PROCEDURE — 99214 OFFICE O/P EST MOD 30 MIN: CPT | Performed by: INTERNAL MEDICINE

## 2022-10-21 PROCEDURE — 1123F ACP DISCUSS/DSCN MKR DOCD: CPT | Performed by: INTERNAL MEDICINE

## 2022-10-21 PROCEDURE — 90471 IMMUNIZATION ADMIN: CPT | Performed by: INTERNAL MEDICINE

## 2022-10-21 NOTE — PATIENT INSTRUCTIONS
Vaccine Information Statement    Influenza (Flu) Vaccine (Inactivated or Recombinant): What You Need to Know    Many vaccine information statements are available in Welsh and other languages. See www.immunize.org/vis. Hojas de información sobre vacunas están disponibles en español y en muchos otros idiomas. Visite www.immunize.org/vis. 1. Why get vaccinated? Influenza vaccine can prevent influenza (flu). Flu is a contagious disease that spreads around the [facility] every year, usually between October and May. Anyone can get the flu, but it is more dangerous for some people. Infants and young children, people 72 years and older, pregnant people, and people with certain health conditions or a weakened immune system are at greatest risk of flu complications. Pneumonia, bronchitis, sinus infections, and ear infections are examples of flu-related complications. If you have a medical condition, such as heart disease, cancer, or diabetes, flu can make it worse. Flu can cause fever and chills, sore throat, muscle aches, fatigue, cough, headache, and runny or stuffy nose. Some people may have vomiting and diarrhea, though this is more common in children than adults. In an average year, thousands of people in the [facility] die from flu, and many more are hospitalized. Flu vaccine prevents millions of illnesses and flu-related visits to the doctor each year. 2. Influenza vaccines     CDC recommends everyone 6 months and older get vaccinated every flu season. Children 6 months through 6years of age may need 2 doses during a single flu season. Everyone else needs only 1 dose each flu season. It takes about 2 weeks for protection to develop after vaccination. There are many flu viruses, and they are always changing. Each year a new flu vaccine is made to protect against the influenza viruses believed to be likely to cause disease in the upcoming flu season.  Even when the vaccine doesnt exactly match these viruses, it may still provide some protection. Influenza vaccine does not cause flu. Influenza vaccine may be given at the same time as other vaccines. 3. Talk with your health care provider    Tell your vaccination provider if the person getting the vaccine:  Has had an allergic reaction after a previous dose of influenza vaccine, or has any severe, life-threatening allergies   Has ever had Guillain-Barré Syndrome (also called GBS)    In some cases, your health care provider may decide to postpone influenza vaccination until a future visit. Influenza vaccine can be administered at any time during pregnancy. People who are or will be pregnant during influenza season should receive inactivated influenza vaccine. People with minor illnesses, such as a cold, may be vaccinated. People who are moderately or severely ill should usually wait until they recover before getting influenza vaccine. Your health care provider can give you more information. 4. Risks of a vaccine reaction    Soreness, redness, and swelling where the shot is given, fever, muscle aches, and headache can happen after influenza vaccination. There may be a very small increased risk of Guillain-Barré Syndrome (GBS) after inactivated influenza vaccine (the flu shot). Heather Dao children who get the flu shot along with pneumococcal vaccine (PCV13) and/or DTaP vaccine at the same time might be slightly more likely to have a seizure caused by fever. Tell your health care provider if a child who is getting flu vaccine has ever had a seizure. People sometimes faint after medical procedures, including vaccination. Tell your provider if you feel dizzy or have vision changes or ringing in the ears. As with any medicine, there is a very remote chance of a vaccine causing a severe allergic reaction, other serious injury, or death. 5. What if there is a serious problem?     An allergic reaction could occur after the vaccinated person leaves the clinic. If you see signs of a severe allergic reaction (hives, swelling of the face and throat, difficulty breathing, a fast heartbeat, dizziness, or weakness), call 9-1-1 and get the person to the nearest hospital.    For other signs that concern you, call your health care provider. Adverse reactions should be reported to the Vaccine Adverse Event Reporting System (VAERS). Your health care provider will usually file this report, or you can do it yourself. Visit the VAERS website at www.vaers. [facility].gov or call 0-925.652.9844. VAERS is only for reporting reactions, and VAERS staff members do not give medical advice. 6. The National Vaccine Injury Compensation Program    The [facility] Vaccine Injury Compensation Program (VICP) is a federal program that was created to compensate people who may have been injured by certain vaccines. Claims regarding alleged injury or death due to vaccination have a time limit for filing, which may be as short as two years. Visit the VICP website at www.[facility].gov/vaccinecompensation or call 0-567.547.9694 to learn about the program and about filing a claim. 7. How can I learn more? Ask your health care provider. Call your local or state health department. Visit the website of the Food and Drug Administration (FDA) for vaccine package inserts and additional information at www.fda.gov/vaccines-blood-biologics/vaccines. Contact the Centers for Disease Control and Prevention (CDC): Call 2-606.481.7742 (2-420-ROL-INFO) or  Visit CDCs influenza website at www.cdc.gov/flu. Vaccine Information Statement   Inactivated Influenza Vaccine   8/6/2021  42 LOUISE Hope [id number]   Department of Health and Human Services  Centers for Disease Control and Prevention    Office Use Only

## 2022-10-21 NOTE — PROGRESS NOTES
Amy Onofre Sr. is a 79 y.o.  male and presents with Hypertension, Immunization/Injection (Flu vaccine), Cholesterol Problem, Blood sugar problem, and Vitamin D Deficiency      SUBJECTIVE:  Pt's HTN is still uncontrolled on Cardizem 240 mg and Diovan 320 mg in the office. It is better controlled with [id number] at home. Patient appears to have significant whitecoat hypertension so he will try and monitor his blood pressure at home and bring in record when he comes into the office. He is followed by Cardiology for his Afib, . Pt's BPH and LUTS are controlled off medications currently. Pt is followed by urology. He had a negative biopsy for prostate cancer. Pt is followed by GI for his Crohn's disease which is fairly stable off mesalamine. Pt continues to try and cut back the sweats and carbs in his diet to improve his prediabetes. .  Allergies are well controlled on Claritin-D and Flonase and Singulair. Pt hasVit D deficiency that is uncontrolled on vit D 2000 units/day. The 10-year ASCVD risk score (Vinicio DK, et al., 2019) is: 21.6% and for his dyslipidemia would recommend a statin unless pt able to lose about 15 lbs. Respiratory ROS: negative for - shortness of breath  Cardiovascular ROS: negative for - chest pain    Current Outpatient Medications   Medication Sig    acetaminophen (TYLENOL) 500 mg tablet Take  by mouth every six (6) hours as needed for Pain. azelastine (ASTELIN) 137 mcg (0.1 %) nasal spray 2 Sprays by Both Nostrils route two (2) times a day. Use in each nostril as directed    valsartan (DIOVAN) 320 mg tablet Take 1 Tablet by mouth daily. aspirin 81 mg chewable tablet Take 81 mg by mouth daily. dilTIAZem CD (CARDIZEM CD) 240 mg ER capsule Take 1 Cap by mouth daily. flecainide (TAMBOCOR) 50 mg tablet 50 mg two (2) times a day. cyclobenzaprine (FLEXERIL) 10 mg tablet Take 10 mg by mouth every eight (8) hours as needed.  (Patient not taking: Reported on 10/21/2022)    tadalafiL (CIALIS) 20 mg tablet Take 1 Tablet by mouth daily as needed for Erectile Dysfunction. (Patient not taking: No sig reported)    fluticasone propionate (FLONASE) 50 mcg/actuation nasal spray 2 Sprays by Both Nostrils route daily. (Patient not taking: No sig reported)     No current facility-administered medications for this visit. OBJECTIVE:  alert, well appearing, and in no distress  Visit Vitals  BP (!) 144/82 (BP 1 Location: Right arm, BP Patient Position: Sitting, BP Cuff Size: Adult)   Pulse (!) 59   Temp 98.9 °F (37.2 °C) (Temporal)   Resp 20   Ht 6' (1.829 m)   Wt 216 lb (98 kg)   SpO2 97%   BMI 29.29 kg/m²      well developed and well nourished  Chest - clear to auscultation, no wheezes, rales or rhonchi, symmetric air entry  Heart - normal rate, regular rhythm, normal S1, S2, no murmurs, rubs, clicks or gallops  Extremities - peripheral pulses normal, no pedal edema, no clubbing or cyanosis    Labs:   Lab Results   Component Value Date/Time    Cholesterol, total 184 10/14/2022 08:25 AM    HDL Cholesterol 42 10/14/2022 08:25 AM    LDL, calculated 124 (H) 10/14/2022 08:25 AM    Triglyceride 90 10/14/2022 08:25 AM    CHOL/HDL Ratio 4.4 10/14/2022 08:25 AM     Lab Results   Component Value Date/Time    ALT (SGPT) 43 10/14/2022 08:25 AM    Alk.  phosphatase 96 10/14/2022 08:25 AM    Bilirubin, total 0.5 10/14/2022 08:25 AM     Lab Results   Component Value Date/Time    GFR est AA >60 04/12/2022 10:22 AM    GFR est non-AA >60 04/12/2022 10:22 AM    Creatinine 0.99 10/14/2022 08:25 AM    BUN 12 10/14/2022 08:25 AM    Sodium 142 10/14/2022 08:25 AM    Potassium 4.2 10/14/2022 08:25 AM    Chloride 108 10/14/2022 08:25 AM    CO2 30 10/14/2022 08:25 AM      Lab Results   Component Value Date/Time    Prostate Specific Ag 0.85 09/16/2021 09:09 AM    Prostate Specific Ag 4.58 (H) 09/03/2020 09:32 AM    Prostate Specific Ag 5.02 (H) 07/11/2019 10:50 AM    Prostate Specific Ag 5.0 (H) 04/09/2019 09:41 AM    Prostate Specific Ag 3.6 04/09/2018 08:58 AM    Prostate Specific Ag 2.5 02/16/2017 09:14 AM     Lab Results   Component Value Date/Time    Glucose 103 (H) 10/14/2022 08:25 AM      Labs:   Lab Results   Component Value Date/Time    Hemoglobin A1c 6.3 (H) 10/14/2022 08:25 AM    Hemoglobin A1c 6.3 (H) 04/12/2022 10:22 AM    Hemoglobin A1c 5.9 (H) 12/07/2021 08:50 AM    Glucose 103 (H) 10/14/2022 08:25 AM    LDL, calculated 124 (H) 10/14/2022 08:25 AM    Creatinine 0.99 10/14/2022 08:25 AM          Assessment/Plan      ICD-10-CM ICD-9-CM    1. Essential hypertension  I10 401.9 Better controlled at home with Diovan added to Cardizem but due to whitecoat hypertension patient will monitor blood pressure at home and bring in record at next office visit METABOLIC PANEL, COMPREHENSIVE      2. Dyslipidemia  E78.5 272.4 Uncontrolled and recommendation would be to consider a statin on a statin. We will discuss at next office visit LIPID PANEL      3. [facility] disease of [facility] without complication ([facility]  D54.29 555.1 Patient controlled on medication managed by gastroenterology      4. Vitamin D deficiency  E55.9 268.9 Well-controlled current supplementation VITAMIN D, 25 HYDROXY      5. Prediabetes  R73.03 790.29 Controlled with diet HEMOGLOBIN A1C W/O EAG      6. Longstanding persistent atrial fibrillation (HCC)  I48.11 427.31 Patient stable on flecainide and Cardizem and followed by cardiology      7. Encounter for immunization  Z23 V03.89 ADMIN INFLUENZA VIRUS VAC      INFLUENZA, FLUAD, (AGE 72 Y+), IM, PF, 0.5 ML                Follow-up and Dispositions    Return in about 6 months (around 4/21/2023) for labs 1 week before. Reviewed plan of care. Patient has provided input and agrees with goals.

## 2022-10-21 NOTE — PROGRESS NOTES
Patient is in the office today for a 3 month follow up. Do you have an Advance Directive no  Do you want more information : information given    1. \"Have you been to the ER, urgent care clinic since your last visit? Hospitalized since your last visit? \" No    2. \"Have you seen or consulted any other health care providers outside of the [address] [place] [address] since your last visit? \" No     3. For patients aged 39-70: Has the patient had a colonoscopy / FIT/ Cologuard? Yes - no Care Gap present      If the patient is female:    4. For patients aged 41-77: Has the patient had a mammogram within the past 2 years? NA - based on age or sex      11. For patients aged 21-65: Has the patient had a pap smear? NA - based on age or sex    Verbal order read back per Dr. Roxanne Mercer flu vaccine. Patient received flu vaccine in left deltoid. Patient was observed and no signs or symptoms of an allergic reaction noted at this time. Patient tolerated well and left without complaints. Patient received flu VIS.

## 2023-02-04 DIAGNOSIS — E55.9 VITAMIN D DEFICIENCY: Primary | ICD-10-CM

## 2023-02-04 DIAGNOSIS — R73.03 PREDIABETES: Primary | ICD-10-CM

## 2023-02-05 DIAGNOSIS — I10 ESSENTIAL HYPERTENSION: Primary | ICD-10-CM

## 2023-02-05 DIAGNOSIS — E78.5 DYSLIPIDEMIA: Primary | ICD-10-CM

## 2023-02-15 PROBLEM — K63.5 POLYP OF COLON: Status: ACTIVE | Noted: 2023-02-15

## 2023-02-15 PROBLEM — K50.90 REGIONAL ENTERITIS (HCC): Status: ACTIVE | Noted: 2023-02-15

## 2023-02-15 PROBLEM — Z77.090 EXPOSURE TO ASBESTOS: Status: ACTIVE | Noted: 2023-02-15

## 2023-02-15 PROBLEM — H52.4 PRESBYOPIA: Status: ACTIVE | Noted: 2023-02-15

## 2023-02-15 PROBLEM — H52.209 ASTIGMATISM: Status: ACTIVE | Noted: 2023-02-15

## 2023-02-15 PROBLEM — R39.9 LOWER URINARY TRACT SYMPTOMS: Status: ACTIVE | Noted: 2023-02-15

## 2023-02-15 PROBLEM — D57.3 SICKLE CELL TRAIT (HCC): Status: ACTIVE | Noted: 2023-02-15

## 2023-02-15 PROBLEM — R73.03 PREDIABETES: Status: ACTIVE | Noted: 2023-02-15

## 2023-02-15 PROBLEM — F12.10 CANNABIS ABUSE: Status: ACTIVE | Noted: 2023-02-15

## 2023-02-23 ENCOUNTER — OFFICE VISIT (OUTPATIENT)
Age: 68
End: 2023-02-23
Payer: COMMERCIAL

## 2023-02-23 VITALS
HEART RATE: 64 BPM | TEMPERATURE: 98.7 F | RESPIRATION RATE: 20 BRPM | BODY MASS INDEX: 29.66 KG/M2 | WEIGHT: 219 LBS | DIASTOLIC BLOOD PRESSURE: 71 MMHG | SYSTOLIC BLOOD PRESSURE: 152 MMHG | OXYGEN SATURATION: 98 % | HEIGHT: 72 IN

## 2023-02-23 DIAGNOSIS — R04.0 EPISTAXIS: Primary | ICD-10-CM

## 2023-02-23 DIAGNOSIS — I10 ESSENTIAL (PRIMARY) HYPERTENSION: ICD-10-CM

## 2023-02-23 PROCEDURE — 3078F DIAST BP <80 MM HG: CPT | Performed by: INTERNAL MEDICINE

## 2023-02-23 PROCEDURE — 1123F ACP DISCUSS/DSCN MKR DOCD: CPT | Performed by: INTERNAL MEDICINE

## 2023-02-23 PROCEDURE — 99213 OFFICE O/P EST LOW 20 MIN: CPT | Performed by: INTERNAL MEDICINE

## 2023-02-23 PROCEDURE — 3074F SYST BP LT 130 MM HG: CPT | Performed by: INTERNAL MEDICINE

## 2023-02-23 RX ORDER — VALSARTAN 320 MG/1
320 TABLET ORAL DAILY
Qty: 90 TABLET | Refills: 1 | COMMUNITY
Start: 2023-02-23

## 2023-02-23 SDOH — ECONOMIC STABILITY: FOOD INSECURITY: WITHIN THE PAST 12 MONTHS, YOU WORRIED THAT YOUR FOOD WOULD RUN OUT BEFORE YOU GOT MONEY TO BUY MORE.: NEVER TRUE

## 2023-02-23 SDOH — ECONOMIC STABILITY: HOUSING INSECURITY
IN THE LAST 12 MONTHS, WAS THERE A TIME WHEN YOU DID NOT HAVE A STEADY PLACE TO SLEEP OR SLEPT IN A SHELTER (INCLUDING NOW)?: NO

## 2023-02-23 SDOH — ECONOMIC STABILITY: FOOD INSECURITY: WITHIN THE PAST 12 MONTHS, THE FOOD YOU BOUGHT JUST DIDN'T LAST AND YOU DIDN'T HAVE MONEY TO GET MORE.: NEVER TRUE

## 2023-02-23 SDOH — ECONOMIC STABILITY: INCOME INSECURITY: HOW HARD IS IT FOR YOU TO PAY FOR THE VERY BASICS LIKE FOOD, HOUSING, MEDICAL CARE, AND HEATING?: NOT HARD AT ALL

## 2023-02-23 ASSESSMENT — PATIENT HEALTH QUESTIONNAIRE - PHQ9
2. FEELING DOWN, DEPRESSED OR HOPELESS: 0
1. LITTLE INTEREST OR PLEASURE IN DOING THINGS: 0
SUM OF ALL RESPONSES TO PHQ QUESTIONS 1-9: 0
SUM OF ALL RESPONSES TO PHQ QUESTIONS 1-9: 0
SUM OF ALL RESPONSES TO PHQ9 QUESTIONS 1 & 2: 0
SUM OF ALL RESPONSES TO PHQ QUESTIONS 1-9: 0
SUM OF ALL RESPONSES TO PHQ QUESTIONS 1-9: 0

## 2023-02-23 NOTE — PROGRESS NOTES
Afshin Morton presents today for   Chief Complaint   Patient presents with    Epistaxis     On and off for the past couple of days           1. \"Have you been to the ER, urgent care clinic since your last visit? Hospitalized since your last visit? \" no    2. \"Have you seen or consulted any other health care providers outside of the [address] [place] [address] since your last visit? \" no     3. For patients aged 39-70: Has the patient had a colonoscopy / FIT/ Cologuard? Yes - no Care Gap present      If the patient is female:    4. For patients aged 41-77: Has the patient had a mammogram within the past 2 years? NA - based on age or sex      11. For patients aged 21-65: Has the patient had a pap smear?  NA - based on age or sex

## 2023-02-25 NOTE — PROGRESS NOTES
Subjective:      Patient ID: Judy Cardona Sr. is a 79 y.o. male. Epistaxis   The bleeding has been from both nares. This is a new problem. The current episode started in the past 7 days. The problem occurs every several days. The problem has been gradually improving. Associated with: using Nasal steriod (nasacort) for recent severe sinus congestion. He has tried pressure and ice for the symptoms. The treatment provided significant relief. His past medical history is significant for allergies. BP is elevated in the office today so he will continue to monitor and bring in copy of BP readings at next OV    Review of Systems   HENT:  Positive for nosebleeds. Objective:   Physical Exam  HENT:      Head: Atraumatic. Right Ear: Hearing and tympanic membrane normal.      Left Ear: Hearing and tympanic membrane normal.      Nose:      Right Nostril: No epistaxis. Left Nostril: No epistaxis. Right Turbinates: Pale. Left Turbinates: Pale. Right Sinus: No maxillary sinus tenderness or frontal sinus tenderness. Left Sinus: No maxillary sinus tenderness or frontal sinus tenderness. Visit Vitals  BP (!) 152/71 (Site: Right Upper Arm, Position: Sitting, Cuff Size: Large Adult)   Pulse 64   Temp 98.7 °F (37.1 °C) (Temporal)   Resp 20   Ht 6' (1.829 m)   Wt 219 lb (99.3 kg)   SpO2 98%   BMI 29.70 kg/m²     Current Outpatient Medications   Medication Sig    valsartan (DIOVAN) 320 MG tablet Take 1 tablet by mouth daily    benzonatate (TESSALON) 100 MG capsule TAKE 1 CAPSULE BY MOUTH THREE TIMES DAILY AS NEEDED FOR COUGHING.  SWALLOW CAPSULES WHOLE    dilTIAZem (CARDIZEM CD) 240 MG extended release capsule TAKE 1 CAPSULE BY MOUTH ONCE DAILY    doxycycline hyclate (VIBRAMYCIN) 100 MG capsule     acetaminophen (TYLENOL) 500 MG tablet Take by mouth every 6 hours as needed    aspirin 81 MG chewable tablet Take 81 mg by mouth daily    flecainide (TAMBOCOR) 50 MG tablet 50 mg 2 times daily fluticasone (FLONASE) 50 MCG/ACT nasal spray 2 sprays by Nasal route daily     No current facility-administered medications for this visit. Assessment / Plan:        ICD-10-CM    1. Epistaxis  R04.0 Improved off Nasal steroid which he will continue to not take. Can use in future only for short periods. 2. Essential (primary) hypertension  I10 Uncontrolled on Diovan and Cardizem. Pt to monitor at home and bring in record at next OV             Return in about 2 months (around 4/28/2023) for labs 1 week before.

## 2023-04-19 ENCOUNTER — HOSPITAL ENCOUNTER (OUTPATIENT)
Facility: HOSPITAL | Age: 68
Setting detail: SPECIMEN
Discharge: HOME | End: 2023-04-22
Payer: COMMERCIAL

## 2023-04-19 DIAGNOSIS — R73.03 PREDIABETES: ICD-10-CM

## 2023-04-19 DIAGNOSIS — I10 ESSENTIAL HYPERTENSION: ICD-10-CM

## 2023-04-19 DIAGNOSIS — E78.5 DYSLIPIDEMIA: ICD-10-CM

## 2023-04-19 DIAGNOSIS — E55.9 VITAMIN D DEFICIENCY: ICD-10-CM

## 2023-04-19 LAB
25(OH)D3 SERPL-MCNC: 34 NG/ML (ref 30–100)
ALBUMIN SERPL-MCNC: 3.7 G/DL (ref 3.4–5)
ALBUMIN/GLOB SERPL: 1 (ref 0.8–1.7)
ALP SERPL-CCNC: 91 U/L (ref 45–117)
ALT SERPL-CCNC: 49 U/L (ref 16–61)
ANION GAP SERPL CALC-SCNC: 1 MMOL/L (ref 3–18)
AST SERPL-CCNC: 25 U/L (ref 10–38)
BILIRUB SERPL-MCNC: 0.3 MG/DL (ref 0.2–1)
BUN SERPL-MCNC: 12 MG/DL (ref 7–18)
BUN/CREAT SERPL: 11 (ref 12–20)
CALCIUM SERPL-MCNC: 10.4 MG/DL (ref 8.5–10.1)
CHLORIDE SERPL-SCNC: 107 MMOL/L (ref 100–111)
CHOLEST SERPL-MCNC: 194 MG/DL
CO2 SERPL-SCNC: 31 MMOL/L (ref 21–32)
CREAT SERPL-MCNC: 1.1 MG/DL (ref 0.6–1.3)
EST. AVERAGE GLUCOSE BLD GHB EST-MCNC: 134 MG/DL
GLOBULIN SER CALC-MCNC: 3.7 G/DL (ref 2–4)
GLUCOSE SERPL-MCNC: 165 MG/DL (ref 74–99)
HBA1C MFR BLD: 6.3 % (ref 4.2–5.6)
HDLC SERPL-MCNC: 46 MG/DL (ref 40–60)
HDLC SERPL: 4.2 (ref 0–5)
LDLC SERPL CALC-MCNC: 120.8 MG/DL (ref 0–100)
LIPID PANEL: ABNORMAL
POTASSIUM SERPL-SCNC: 4 MMOL/L (ref 3.5–5.5)
PROT SERPL-MCNC: 7.4 G/DL (ref 6.4–8.2)
SODIUM SERPL-SCNC: 139 MMOL/L (ref 136–145)
TRIGL SERPL-MCNC: 136 MG/DL
VLDLC SERPL CALC-MCNC: 27.2 MG/DL

## 2023-04-19 PROCEDURE — 80053 COMPREHEN METABOLIC PANEL: CPT

## 2023-04-19 PROCEDURE — 83036 HEMOGLOBIN GLYCOSYLATED A1C: CPT

## 2023-04-19 PROCEDURE — 82306 VITAMIN D 25 HYDROXY: CPT

## 2023-04-19 PROCEDURE — 36415 COLL VENOUS BLD VENIPUNCTURE: CPT

## 2023-04-19 PROCEDURE — 80061 LIPID PANEL: CPT

## 2023-04-25 ENCOUNTER — OFFICE VISIT (OUTPATIENT)
Age: 68
End: 2023-04-25
Payer: COMMERCIAL

## 2023-04-25 VITALS
BODY MASS INDEX: 30.34 KG/M2 | SYSTOLIC BLOOD PRESSURE: 150 MMHG | WEIGHT: 224 LBS | DIASTOLIC BLOOD PRESSURE: 59 MMHG | TEMPERATURE: 98.1 F | HEIGHT: 72 IN | RESPIRATION RATE: 20 BRPM | HEART RATE: 40 BPM

## 2023-04-25 DIAGNOSIS — D57.3 SICKLE CELL TRAIT (HCC): ICD-10-CM

## 2023-04-25 DIAGNOSIS — I48.11 LONGSTANDING PERSISTENT ATRIAL FIBRILLATION (HCC): ICD-10-CM

## 2023-04-25 DIAGNOSIS — I10 ESSENTIAL (PRIMARY) HYPERTENSION: Primary | ICD-10-CM

## 2023-04-25 DIAGNOSIS — E78.5 DYSLIPIDEMIA: ICD-10-CM

## 2023-04-25 DIAGNOSIS — K50.10 CROHN'S DISEASE OF LARGE INTESTINE WITHOUT COMPLICATIONS (HCC): ICD-10-CM

## 2023-04-25 DIAGNOSIS — R73.03 PREDIABETES: ICD-10-CM

## 2023-04-25 PROCEDURE — 99214 OFFICE O/P EST MOD 30 MIN: CPT | Performed by: INTERNAL MEDICINE

## 2023-04-25 PROCEDURE — 3078F DIAST BP <80 MM HG: CPT | Performed by: INTERNAL MEDICINE

## 2023-04-25 PROCEDURE — 3074F SYST BP LT 130 MM HG: CPT | Performed by: INTERNAL MEDICINE

## 2023-04-25 PROCEDURE — 1123F ACP DISCUSS/DSCN MKR DOCD: CPT | Performed by: INTERNAL MEDICINE

## 2023-04-25 RX ORDER — FLUTICASONE PROPIONATE 50 MCG
2 SPRAY, SUSPENSION (ML) NASAL DAILY
Qty: 16 G | Refills: 5 | Status: SHIPPED | OUTPATIENT
Start: 2023-04-25

## 2023-04-25 SDOH — ECONOMIC STABILITY: INCOME INSECURITY: HOW HARD IS IT FOR YOU TO PAY FOR THE VERY BASICS LIKE FOOD, HOUSING, MEDICAL CARE, AND HEATING?: NOT HARD AT ALL

## 2023-04-25 SDOH — ECONOMIC STABILITY: FOOD INSECURITY: WITHIN THE PAST 12 MONTHS, THE FOOD YOU BOUGHT JUST DIDN'T LAST AND YOU DIDN'T HAVE MONEY TO GET MORE.: NEVER TRUE

## 2023-04-25 SDOH — ECONOMIC STABILITY: FOOD INSECURITY: WITHIN THE PAST 12 MONTHS, YOU WORRIED THAT YOUR FOOD WOULD RUN OUT BEFORE YOU GOT MONEY TO BUY MORE.: NEVER TRUE

## 2023-04-25 ASSESSMENT — PATIENT HEALTH QUESTIONNAIRE - PHQ9
SUM OF ALL RESPONSES TO PHQ QUESTIONS 1-9: 0
2. FEELING DOWN, DEPRESSED OR HOPELESS: 0
SUM OF ALL RESPONSES TO PHQ9 QUESTIONS 1 & 2: 0
1. LITTLE INTEREST OR PLEASURE IN DOING THINGS: 0
SUM OF ALL RESPONSES TO PHQ QUESTIONS 1-9: 0

## 2023-04-25 NOTE — PROGRESS NOTES
Esmer Moreno presents today for   Chief Complaint   Patient presents with    Hypertension     2 month follow up       1. \"Have you been to the ER, urgent care clinic since your last visit? Hospitalized since your last visit? \" no    2. \"Have you seen or consulted any other health care providers outside of the [address] [place] [address] since your last visit? \" no     3. For patients aged 39-70: Has the patient had a colonoscopy / FIT/ Cologuard? Yes - no Care Gap present      If the patient is female:    4. For patients aged 41-77: Has the patient had a mammogram within the past 2 years? NA - based on age or sex      11. For patients aged 21-65: Has the patient had a pap smear?  NA - based on age or sex
for up to 14 days. Max Daily Amount: 20 mg     No current facility-administered medications for this visit. OBJECTIVE:  alert, well appearing, and in no distress  Visit Vitals  BP (!) 150/59 (Site: Right Upper Arm, Position: Sitting, Cuff Size: Large Adult)   Pulse (!) 40   Temp 98.1 °F (36.7 °C) (Temporal)   Resp 20   Ht 6' (1.829 m)   Wt 224 lb (101.6 kg)   BMI 30.38 kg/m²       well developed and well nourished  Chest - clear to auscultation, no wheezes, rales or rhonchi, symmetric air entry  Heart - normal rate, regular rhythm, normal S1, S2, no murmurs, rubs, clicks or gallops  Extremities - peripheral pulses normal, no pedal edema, no clubbing or cyanosis    CMP:    Lab Results   Component Value Date/Time     04/19/2023 01:30 PM    K 4.0 04/19/2023 01:30 PM     04/19/2023 01:30 PM    CO2 31 04/19/2023 01:30 PM    BUN 12 04/19/2023 01:30 PM    CREATININE 1.10 04/19/2023 01:30 PM    GFRAA >60 04/12/2022 10:22 AM    AGRATIO 1.1 10/14/2022 08:25 AM    LABGLOM >60 04/19/2023 01:30 PM    GLUCOSE 165 04/19/2023 01:30 PM    PROT 7.4 04/19/2023 01:30 PM    LABALBU 3.7 04/19/2023 01:30 PM    CALCIUM 10.4 04/19/2023 01:30 PM    BILITOT 0.3 04/19/2023 01:30 PM    ALKPHOS 91 04/19/2023 01:30 PM    ALKPHOS 96 10/14/2022 08:25 AM    AST 25 04/19/2023 01:30 PM    ALT 49 04/19/2023 01:30 PM     HgBA1c:    Lab Results   Component Value Date/Time    LABA1C 6.3 04/19/2023 01:30 PM     FLP:    Lab Results   Component Value Date/Time    TRIG 136 04/19/2023 01:30 PM    HDL 46 04/19/2023 01:30 PM    LDLCALC 120.8 04/19/2023 01:30 PM    LABVLDL 27.2 04/19/2023 01:30 PM     PSA:   Lab Results   Component Value Date/Time    PSA 1.25 11/16/2022 09:00 AM       Assessment/Plan    ICD-10-CM    1. Essential (primary) hypertension  I10 Pt to monitor BP on a regular basis and bring in readings. If not controlled on current meds will need to make adjust ment. Comprehensive Metabolic Panel      2.  Dyslipidemia  E78.5 Pt

## 2023-04-30 PROBLEM — F11.20 OPIOID DEPENDENCE WITH CURRENT USE (HCC): Status: ACTIVE | Noted: 2023-04-30

## 2023-07-24 ENCOUNTER — HOSPITAL ENCOUNTER (OUTPATIENT)
Facility: HOSPITAL | Age: 68
Discharge: HOME | End: 2023-07-27
Payer: COMMERCIAL

## 2023-07-24 DIAGNOSIS — R19.5 ABNORMAL FECES: ICD-10-CM

## 2023-07-24 DIAGNOSIS — K62.5 RECTAL BLEEDING: ICD-10-CM

## 2023-07-24 DIAGNOSIS — R19.8 TENESMUS (RECTAL): ICD-10-CM

## 2023-07-24 PROCEDURE — 82565 ASSAY OF CREATININE: CPT

## 2023-07-24 PROCEDURE — 74177 CT ABD & PELVIS W/CONTRAST: CPT

## 2023-07-24 PROCEDURE — 6360000004 HC RX CONTRAST MEDICATION: Performed by: NURSE PRACTITIONER

## 2023-07-24 RX ADMIN — IOPAMIDOL 100 ML: 612 INJECTION, SOLUTION INTRAVENOUS at 16:24

## 2023-07-25 LAB — CREAT UR-MCNC: 1 MG/DL (ref 0.6–1.3)

## 2023-08-22 ENCOUNTER — HOSPITAL ENCOUNTER (OUTPATIENT)
Facility: HOSPITAL | Age: 68
Setting detail: SPECIMEN
Discharge: HOME | End: 2023-08-25
Payer: COMMERCIAL

## 2023-08-22 DIAGNOSIS — R73.03 PREDIABETES: ICD-10-CM

## 2023-08-22 DIAGNOSIS — E78.5 DYSLIPIDEMIA: ICD-10-CM

## 2023-08-22 DIAGNOSIS — I10 ESSENTIAL (PRIMARY) HYPERTENSION: ICD-10-CM

## 2023-08-22 LAB
ALBUMIN SERPL-MCNC: 3.4 G/DL (ref 3.4–5)
ALBUMIN/GLOB SERPL: 1 (ref 0.8–1.7)
ALP SERPL-CCNC: 92 U/L (ref 45–117)
ALT SERPL-CCNC: 34 U/L (ref 16–61)
ANION GAP SERPL CALC-SCNC: 5 MMOL/L (ref 3–18)
AST SERPL-CCNC: 18 U/L (ref 10–38)
BILIRUB SERPL-MCNC: 0.5 MG/DL (ref 0.2–1)
BUN SERPL-MCNC: 13 MG/DL (ref 7–18)
BUN/CREAT SERPL: 13 (ref 12–20)
CALCIUM SERPL-MCNC: 9.8 MG/DL (ref 8.5–10.1)
CHLORIDE SERPL-SCNC: 105 MMOL/L (ref 100–111)
CHOLEST SERPL-MCNC: 152 MG/DL
CO2 SERPL-SCNC: 30 MMOL/L (ref 21–32)
CREAT SERPL-MCNC: 1.04 MG/DL (ref 0.6–1.3)
EST. AVERAGE GLUCOSE BLD GHB EST-MCNC: 131 MG/DL
GLOBULIN SER CALC-MCNC: 3.3 G/DL (ref 2–4)
GLUCOSE SERPL-MCNC: 170 MG/DL (ref 74–99)
HBA1C MFR BLD: 6.2 % (ref 4.2–5.6)
HDLC SERPL-MCNC: 43 MG/DL (ref 40–60)
HDLC SERPL: 3.5 (ref 0–5)
LDLC SERPL CALC-MCNC: 88.4 MG/DL (ref 0–100)
LIPID PANEL: NORMAL
POTASSIUM SERPL-SCNC: 3.8 MMOL/L (ref 3.5–5.5)
PROT SERPL-MCNC: 6.7 G/DL (ref 6.4–8.2)
SODIUM SERPL-SCNC: 140 MMOL/L (ref 136–145)
TRIGL SERPL-MCNC: 103 MG/DL
VLDLC SERPL CALC-MCNC: 20.6 MG/DL

## 2023-08-22 PROCEDURE — 80061 LIPID PANEL: CPT

## 2023-08-22 PROCEDURE — 80053 COMPREHEN METABOLIC PANEL: CPT

## 2023-08-22 PROCEDURE — 36415 COLL VENOUS BLD VENIPUNCTURE: CPT

## 2023-08-22 PROCEDURE — 83036 HEMOGLOBIN GLYCOSYLATED A1C: CPT

## 2023-08-29 ENCOUNTER — OFFICE VISIT (OUTPATIENT)
Age: 68
End: 2023-08-29
Payer: COMMERCIAL

## 2023-08-29 VITALS
OXYGEN SATURATION: 98 % | HEIGHT: 72 IN | TEMPERATURE: 98.5 F | BODY MASS INDEX: 29.53 KG/M2 | DIASTOLIC BLOOD PRESSURE: 67 MMHG | RESPIRATION RATE: 20 BRPM | HEART RATE: 40 BPM | SYSTOLIC BLOOD PRESSURE: 156 MMHG | WEIGHT: 218 LBS

## 2023-08-29 DIAGNOSIS — I10 ESSENTIAL (PRIMARY) HYPERTENSION: Primary | ICD-10-CM

## 2023-08-29 DIAGNOSIS — K50.10 CROHN'S DISEASE OF LARGE INTESTINE WITHOUT COMPLICATIONS (HCC): ICD-10-CM

## 2023-08-29 DIAGNOSIS — I48.11 LONGSTANDING PERSISTENT ATRIAL FIBRILLATION (HCC): ICD-10-CM

## 2023-08-29 DIAGNOSIS — R73.03 PREDIABETES: ICD-10-CM

## 2023-08-29 DIAGNOSIS — E78.5 DYSLIPIDEMIA: ICD-10-CM

## 2023-08-29 PROCEDURE — 1123F ACP DISCUSS/DSCN MKR DOCD: CPT | Performed by: INTERNAL MEDICINE

## 2023-08-29 PROCEDURE — 3077F SYST BP >= 140 MM HG: CPT | Performed by: INTERNAL MEDICINE

## 2023-08-29 PROCEDURE — 99214 OFFICE O/P EST MOD 30 MIN: CPT | Performed by: INTERNAL MEDICINE

## 2023-08-29 PROCEDURE — 3078F DIAST BP <80 MM HG: CPT | Performed by: INTERNAL MEDICINE

## 2023-08-29 RX ORDER — AMLODIPINE BESYLATE 5 MG/1
5 TABLET ORAL DAILY
COMMUNITY
Start: 2023-08-03

## 2023-08-29 RX ORDER — FLUTICASONE PROPIONATE 50 MCG
2 SPRAY, SUSPENSION (ML) NASAL DAILY
Qty: 16 G | Refills: 5 | Status: SHIPPED | OUTPATIENT
Start: 2023-08-29

## 2023-08-29 NOTE — PROGRESS NOTES
Pennie Funes Sr. is a 79 y.o.  male and presents with Hypertension, I Cholesterol Problem, Blood sugar problem, and Vitamin D Deficiency      SUBJECTIVE:  Pt's HTN is still uncontrolled on Cardizem 240 mg and Diovan 320 mg in the office. Patient was recently seen by the [place] and started on Norvasc 5 mg but he has not started the medication yet. Hopefully this along with current diet and continued weight loss will help to bring blood pressure under better control. Patient will continue to bring his blood pressure readings    he is followed by Cardiology for his Afib, . Pt's BPH and LUTS are controlled off medications currently. Pt is followed by urology. Pt is followed by GI for his Crohn's disease which is fairly stable off mesalamine. Pt continues to try and cut back the sugar and carbs in his diet to improve his prediabetes. .  Allergies are well controlled on Claritin-D and Flonase and Singulair. Pt hasVit D deficiency that is controlled on vit D 2000 units/day. The 10-year ASCVD risk score (Sole JOSEPH, et al., 2019) is: 23.4%   and for his dyslipidemia would recommend a statin . He wants to do a heart Scan before he conisders starting a statin.   We will also defer management of this to his cardiologist.    Respiratory ROS: negative for - shortness of breath  Cardiovascular ROS: negative for - chest pain    Current Outpatient Medications   Medication Sig    amLODIPine (NORVASC) 5 MG tablet Take 1 tablet by mouth daily [facility]    fluticasone (FLONASE) 50 MCG/ACT nasal spray 2 sprays by Nasal route daily    valsartan (DIOVAN) 320 MG tablet Take 1 tablet by mouth daily    dilTIAZem (CARDIZEM CD) 240 MG extended release capsule TAKE 1 CAPSULE BY MOUTH ONCE DAILY    acetaminophen (TYLENOL) 500 MG tablet Take by mouth every 6 hours as needed    aspirin 81 MG chewable tablet Take 1 tablet by mouth once a week    flecainide (TAMBOCOR) 50 MG tablet 1 tablet 2 times daily     No current

## 2023-08-29 NOTE — PROGRESS NOTES
Jessica Robles presents today for   Chief Complaint   Patient presents with    Hypertension    Cholesterol Problem     4 month follow up      Patient states he has not started Amlodipine yet. He just received it in the mail yesterday from the [place]. 1. \"Have you been to the ER, urgent care clinic since your last visit? Hospitalized since your last visit? \" no    2. \"Have you seen or consulted any other health care providers outside of the [address] [place] [address] since your last visit? \" Yes, [facility]. 3. For patients aged 43-73: Has the patient had a colonoscopy / FIT/ Cologuard? Yes - no Care Gap present      If the patient is female:    4. For patients aged 43-66: Has the patient had a mammogram within the past 2 years? NA - based on age or sex      11. For patients aged 21-65: Has the patient had a pap smear?  NA - based on age or sex

## 2023-09-21 PROBLEM — M25.562 PAIN IN LEFT KNEE: Status: ACTIVE | Noted: 2023-09-21

## 2023-09-21 PROBLEM — M25.562 ARTHRALGIA OF LEFT KNEE: Status: ACTIVE | Noted: 2023-09-21

## 2023-09-21 PROBLEM — R52 PAIN, UNSPECIFIED: Status: ACTIVE | Noted: 2023-09-21

## 2023-09-21 PROBLEM — E78.1 HYPERTRIGLYCERIDEMIA: Status: ACTIVE | Noted: 2023-09-21

## 2023-09-21 PROBLEM — H90.3 SENSORINEURAL HEARING LOSS, BILATERAL: Status: ACTIVE | Noted: 2023-09-21

## 2023-12-13 ENCOUNTER — HOSPITAL ENCOUNTER (OUTPATIENT)
Facility: HOSPITAL | Age: 68
Setting detail: SPECIMEN
Discharge: HOME | End: 2023-12-16
Payer: COMMERCIAL

## 2023-12-13 DIAGNOSIS — I10 ESSENTIAL (PRIMARY) HYPERTENSION: ICD-10-CM

## 2023-12-13 DIAGNOSIS — R73.03 PREDIABETES: ICD-10-CM

## 2023-12-13 DIAGNOSIS — E78.5 DYSLIPIDEMIA: ICD-10-CM

## 2023-12-13 LAB
ALBUMIN SERPL-MCNC: 3.5 G/DL (ref 3.4–5)
ALBUMIN/GLOB SERPL: 1.1 (ref 0.8–1.7)
ALP SERPL-CCNC: 91 U/L (ref 45–117)
ALT SERPL-CCNC: 37 U/L (ref 16–61)
ANION GAP SERPL CALC-SCNC: 1 MMOL/L (ref 3–18)
AST SERPL-CCNC: 21 U/L (ref 10–38)
BILIRUB SERPL-MCNC: 0.3 MG/DL (ref 0.2–1)
BUN SERPL-MCNC: 13 MG/DL (ref 7–18)
BUN/CREAT SERPL: 13 (ref 12–20)
CALCIUM SERPL-MCNC: 10.1 MG/DL (ref 8.5–10.1)
CHLORIDE SERPL-SCNC: 110 MMOL/L (ref 100–111)
CHOLEST SERPL-MCNC: 163 MG/DL
CO2 SERPL-SCNC: 31 MMOL/L (ref 21–32)
CREAT SERPL-MCNC: 1 MG/DL (ref 0.6–1.3)
EST. AVERAGE GLUCOSE BLD GHB EST-MCNC: 123 MG/DL
GLOBULIN SER CALC-MCNC: 3.3 G/DL (ref 2–4)
GLUCOSE SERPL-MCNC: 79 MG/DL (ref 74–99)
HBA1C MFR BLD: 5.9 % (ref 4.2–5.6)
HDLC SERPL-MCNC: 38 MG/DL (ref 40–60)
HDLC SERPL: 4.3 (ref 0–5)
LDLC SERPL CALC-MCNC: 100 MG/DL (ref 0–100)
LIPID PANEL: ABNORMAL
POTASSIUM SERPL-SCNC: 4.3 MMOL/L (ref 3.5–5.5)
PROT SERPL-MCNC: 6.8 G/DL (ref 6.4–8.2)
SODIUM SERPL-SCNC: 142 MMOL/L (ref 136–145)
TRIGL SERPL-MCNC: 125 MG/DL
VLDLC SERPL CALC-MCNC: 25 MG/DL

## 2023-12-13 PROCEDURE — 83036 HEMOGLOBIN GLYCOSYLATED A1C: CPT

## 2023-12-13 PROCEDURE — 36415 COLL VENOUS BLD VENIPUNCTURE: CPT

## 2023-12-13 PROCEDURE — 80053 COMPREHEN METABOLIC PANEL: CPT

## 2023-12-13 PROCEDURE — 80061 LIPID PANEL: CPT

## 2024-03-19 ENCOUNTER — HOSPITAL ENCOUNTER (OUTPATIENT)
Facility: HOSPITAL | Age: 69
Setting detail: SPECIMEN
Discharge: HOME | End: 2024-03-22
Payer: COMMERCIAL

## 2024-03-19 DIAGNOSIS — E78.5 DYSLIPIDEMIA: ICD-10-CM

## 2024-03-19 DIAGNOSIS — R73.03 PREDIABETES: ICD-10-CM

## 2024-03-19 DIAGNOSIS — I10 ESSENTIAL (PRIMARY) HYPERTENSION: ICD-10-CM

## 2024-03-19 LAB
ALBUMIN SERPL-MCNC: 3.8 G/DL (ref 3.4–5)
ALBUMIN/GLOB SERPL: 1 (ref 0.8–1.7)
ALP SERPL-CCNC: 91 U/L (ref 45–117)
ALT SERPL-CCNC: 48 U/L (ref 16–61)
ANION GAP SERPL CALC-SCNC: 4 MMOL/L (ref 3–18)
AST SERPL-CCNC: 20 U/L (ref 10–38)
BILIRUB SERPL-MCNC: 0.4 MG/DL (ref 0.2–1)
BUN SERPL-MCNC: 10 MG/DL (ref 7–18)
BUN/CREAT SERPL: 10 (ref 12–20)
CALCIUM SERPL-MCNC: 10.1 MG/DL (ref 8.5–10.1)
CHLORIDE SERPL-SCNC: 107 MMOL/L (ref 100–111)
CHOLEST SERPL-MCNC: 170 MG/DL
CO2 SERPL-SCNC: 30 MMOL/L (ref 21–32)
CREAT SERPL-MCNC: 1.04 MG/DL (ref 0.6–1.3)
CREAT UR-MCNC: 70 MG/DL (ref 30–125)
EST. AVERAGE GLUCOSE BLD GHB EST-MCNC: 126 MG/DL
GLOBULIN SER CALC-MCNC: 3.8 G/DL (ref 2–4)
GLUCOSE SERPL-MCNC: 101 MG/DL (ref 74–99)
HBA1C MFR BLD: 6 % (ref 4.2–5.6)
HDLC SERPL-MCNC: 43 MG/DL (ref 40–60)
HDLC SERPL: 4 (ref 0–5)
LDLC SERPL CALC-MCNC: 105.6 MG/DL (ref 0–100)
LIPID PANEL: ABNORMAL
MICROALBUMIN UR-MCNC: 1.82 MG/DL (ref 0–3)
MICROALBUMIN/CREAT UR-RTO: 26 MG/G (ref 0–30)
POTASSIUM SERPL-SCNC: 4.3 MMOL/L (ref 3.5–5.5)
PROT SERPL-MCNC: 7.6 G/DL (ref 6.4–8.2)
SODIUM SERPL-SCNC: 141 MMOL/L (ref 136–145)
TRIGL SERPL-MCNC: 107 MG/DL
VLDLC SERPL CALC-MCNC: 21.4 MG/DL

## 2024-03-19 PROCEDURE — 82570 ASSAY OF URINE CREATININE: CPT

## 2024-03-19 PROCEDURE — 80061 LIPID PANEL: CPT

## 2024-03-19 PROCEDURE — 36415 COLL VENOUS BLD VENIPUNCTURE: CPT

## 2024-03-19 PROCEDURE — 83036 HEMOGLOBIN GLYCOSYLATED A1C: CPT

## 2024-03-19 PROCEDURE — 80053 COMPREHEN METABOLIC PANEL: CPT

## 2024-03-19 PROCEDURE — 82043 UR ALBUMIN QUANTITATIVE: CPT

## 2024-03-27 ENCOUNTER — OFFICE VISIT (OUTPATIENT)
Age: 69
End: 2024-03-27
Payer: COMMERCIAL

## 2024-03-27 VITALS
BODY MASS INDEX: 29.93 KG/M2 | DIASTOLIC BLOOD PRESSURE: 87 MMHG | WEIGHT: 221 LBS | HEIGHT: 72 IN | RESPIRATION RATE: 18 BRPM | TEMPERATURE: 98.1 F | SYSTOLIC BLOOD PRESSURE: 167 MMHG | HEART RATE: 65 BPM | OXYGEN SATURATION: 95 %

## 2024-03-27 DIAGNOSIS — R73.03 PREDIABETES: ICD-10-CM

## 2024-03-27 DIAGNOSIS — I48.11 LONGSTANDING PERSISTENT ATRIAL FIBRILLATION (HCC): ICD-10-CM

## 2024-03-27 DIAGNOSIS — I10 ESSENTIAL (PRIMARY) HYPERTENSION: Primary | ICD-10-CM

## 2024-03-27 DIAGNOSIS — E78.5 DYSLIPIDEMIA: ICD-10-CM

## 2024-03-27 DIAGNOSIS — K50.10 CROHN'S DISEASE OF LARGE INTESTINE WITHOUT COMPLICATIONS (HCC): ICD-10-CM

## 2024-03-27 PROCEDURE — 3077F SYST BP >= 140 MM HG: CPT | Performed by: INTERNAL MEDICINE

## 2024-03-27 PROCEDURE — 3078F DIAST BP <80 MM HG: CPT | Performed by: INTERNAL MEDICINE

## 2024-03-27 PROCEDURE — 99214 OFFICE O/P EST MOD 30 MIN: CPT | Performed by: INTERNAL MEDICINE

## 2024-03-27 PROCEDURE — 1123F ACP DISCUSS/DSCN MKR DOCD: CPT | Performed by: INTERNAL MEDICINE

## 2024-03-27 RX ORDER — FLUTICASONE PROPIONATE 50 MCG
2 SPRAY, SUSPENSION (ML) NASAL DAILY
Qty: 3 EACH | Refills: 3 | Status: SHIPPED | OUTPATIENT
Start: 2024-03-27

## 2024-03-27 SDOH — ECONOMIC STABILITY: FOOD INSECURITY: WITHIN THE PAST 12 MONTHS, THE FOOD YOU BOUGHT JUST DIDN'T LAST AND YOU DIDN'T HAVE MONEY TO GET MORE.: NEVER TRUE

## 2024-03-27 SDOH — ECONOMIC STABILITY: INCOME INSECURITY: HOW HARD IS IT FOR YOU TO PAY FOR THE VERY BASICS LIKE FOOD, HOUSING, MEDICAL CARE, AND HEATING?: NOT HARD AT ALL

## 2024-03-27 SDOH — ECONOMIC STABILITY: FOOD INSECURITY: WITHIN THE PAST 12 MONTHS, YOU WORRIED THAT YOUR FOOD WOULD RUN OUT BEFORE YOU GOT MONEY TO BUY MORE.: NEVER TRUE

## 2024-03-27 ASSESSMENT — PATIENT HEALTH QUESTIONNAIRE - PHQ9
SUM OF ALL RESPONSES TO PHQ9 QUESTIONS 1 & 2: 0
2. FEELING DOWN, DEPRESSED OR HOPELESS: NOT AT ALL
SUM OF ALL RESPONSES TO PHQ QUESTIONS 1-9: 0
1. LITTLE INTEREST OR PLEASURE IN DOING THINGS: NOT AT ALL
SUM OF ALL RESPONSES TO PHQ QUESTIONS 1-9: 0

## 2024-03-27 NOTE — PROGRESS NOTES
Everardo Acharya Sr. presents today for   Chief Complaint   Patient presents with    Blood Sugar Problem    Cholesterol Problem    Hypertension     3 month follow up            \"Have you been to the ER, urgent care clinic since your last visit?  Hospitalized since your last visit?\"    NO    “Have you seen or consulted any other health care providers outside of [facility] since your last visit?”    [facility]               
  Component Value Date/Time    PSA 1.25 11/16/2022 09:00 AM       A/P      ICD-10-CM    1. Essential (primary) hypertension  I10 Uncontrolled on diltiazem 240 mg daily and another medication which patient will bring in at next office visit which was prescribed by his cardiologist.  Will consider adding Aldactone to his current regimen at night.  Patient will also bring his blood pressure readings so we can see if he has issues with whitecoat hypertension.  Will rule out any vascular causes for HTN by checking vascular duplex renal arterial complete      2. Dyslipidemia  E78.5 Uncontrolled.  Patient to consider heart scan or starting on a statin.      3. Prediabetes  R73.03 Borderline controlled with diet.  Patient will continue to work on weight loss      4. Crohn's disease of large intestine without complications (HCC)  K50.10 Stable currently off of medications and followed by GI      5. Longstanding persistent atrial fibrillation (HCC)  I48.11 Stable on flecainide and diltiazem as well as aspirin which is managed by cardiology                      Follow-up and Dispositions    Return in about 4 weeks (around 4/24/2024) for BP Check.           Reviewed plan of care. Patient has provided input and agrees with goals.

## 2024-04-09 ENCOUNTER — HOSPITAL ENCOUNTER (OUTPATIENT)
Facility: HOSPITAL | Age: 69
Discharge: HOME | End: 2024-04-11
Attending: INTERNAL MEDICINE
Payer: COMMERCIAL

## 2024-04-09 DIAGNOSIS — I10 ESSENTIAL (PRIMARY) HYPERTENSION: ICD-10-CM

## 2024-04-09 PROCEDURE — 93975 VASCULAR STUDY: CPT

## 2024-04-10 LAB
VAS AORTA DIST AP: 1.47 CM
VAS AORTA DIST PSV: 117 CM/S
VAS AORTA DIST TR: 1.6 CM
VAS AORTA MID AP: 1.57 CM
VAS AORTA MID PSV: 127.1 CM/S
VAS AORTA MID TRANS: 1.72 CM
VAS AORTA PROX PSV: 129.3 CM/S
VAS LEFT INTERLOBAR EDV: 12 CM/S
VAS LEFT INTERLOBAR PSV: 36.1 CM/S
VAS LEFT INTERLOBAR RI: 0.67
VAS LEFT RENAL DIST EDV: 21 CM/S
VAS LEFT RENAL DIST PSV: 67.7 CM/S
VAS LEFT RENAL DIST RAR: 0.53
VAS LEFT RENAL DIST RI: 0.69
VAS LEFT RENAL LOWER PARENCHYMA EDV: 13.6 CM/S
VAS LEFT RENAL LOWER PARENCHYMA PSV: 40.1 CM/S
VAS LEFT RENAL LOWER PARENCHYMA RI: 0.66
VAS LEFT RENAL MID EDV: 20.8 CM/S
VAS LEFT RENAL MID PSV: 63.3 CM/S
VAS LEFT RENAL MID RAR: 0.5
VAS LEFT RENAL MID RI: 0.67
VAS LEFT RENAL MIDDLE PARENCHYMA EDV: 10.9 CM/S
VAS LEFT RENAL MIDDLE PARENCHYMA PSV: 29.1 CM/S
VAS LEFT RENAL MIDDLE PARENCHYMA RI: 0.63
VAS LEFT RENAL PROX EDV: 22.6 CM/S
VAS LEFT RENAL PROX PSV: 68.5 CM/S
VAS LEFT RENAL PROX RAR: 0.54
VAS LEFT RENAL PROX RI: 0.67
VAS LEFT RENAL RAR: 0.54
VAS LEFT RENAL SEGMENTAL ACCEL TIME: 0.03 S
VAS LEFT RENAL SEGMENTAL ACCEL TIME: 0.03 S
VAS LEFT RENAL SEGMENTAL ACCEL TIME: 0.05 S
VAS LEFT RENAL UPPER PARENCHYMA EDV: 12.7 CM/S
VAS LEFT RENAL UPPER PARENCHYMA PSV: 40.1 CM/S
VAS LEFT RENAL UPPER PARENCHYMA RI: 0.68
VAS RIGHT INTERLOBAR EDV: 12.7 CM/S
VAS RIGHT INTERLOBAR PSV: 35.5 CM/S
VAS RIGHT INTERLOBAR RI: 0.64
VAS RIGHT KIDNEY LENGTH: 10.62 CM
VAS RIGHT RENAL DIST EDV: 30.5 CM/S
VAS RIGHT RENAL DIST PSV: 107.3 CM/S
VAS RIGHT RENAL DIST RAR: 0.84
VAS RIGHT RENAL DIST RI: 0.72
VAS RIGHT RENAL LOWER PARENCHYMA EDV: 7.9 CM/S
VAS RIGHT RENAL LOWER PARENCHYMA PSV: 22.1 CM/S
VAS RIGHT RENAL LOWER PARENCHYMA RI: 0.64
VAS RIGHT RENAL MID EDV: 41.2 CM/S
VAS RIGHT RENAL MID PSV: 121.6 CM/S
VAS RIGHT RENAL MID RAR: 0.96
VAS RIGHT RENAL MID RI: 0.66
VAS RIGHT RENAL MIDDLE PARENCHYMA EDV: 10 CM/S
VAS RIGHT RENAL MIDDLE PARENCHYMA PSV: 30 CM/S
VAS RIGHT RENAL MIDDLE PARENCHYMA RI: 0.67
VAS RIGHT RENAL PROX EDV: 38.6 CM/S
VAS RIGHT RENAL PROX PSV: 124.2 CM/S
VAS RIGHT RENAL PROX RAR: 0.98
VAS RIGHT RENAL PROX RI: 0.69
VAS RIGHT RENAL RAR: 0.98
VAS RIGHT RENAL SEGMENTAL ACCEL TIME: 0.04 S
VAS RIGHT RENAL SEGMENTAL ACCEL TIME: 0.05 S
VAS RIGHT RENAL SEGMENTAL ACCEL TIME: 0.1 S
VAS RIGHT RENAL UPPER PARENCHYMA EDV: 6.7 CM/S
VAS RIGHT RENAL UPPER PARENCHYMA PSV: 19.6 CM/S
VAS RIGHT RENAL UPPER PARENCHYMA RI: 0.66

## 2024-04-10 PROCEDURE — 93975 VASCULAR STUDY: CPT | Performed by: SURGERY

## 2024-04-11 ENCOUNTER — TELEPHONE (OUTPATIENT)
Age: 69
End: 2024-04-11

## 2024-04-11 NOTE — TELEPHONE ENCOUNTER
----- Message from Osiel Luke MD sent at 4/10/2024  4:56 PM EDT -----  Please notify patient that their Renal US is normal

## 2024-04-24 ENCOUNTER — OFFICE VISIT (OUTPATIENT)
Age: 69
End: 2024-04-24
Payer: COMMERCIAL

## 2024-04-24 VITALS
HEIGHT: 72 IN | OXYGEN SATURATION: 100 % | RESPIRATION RATE: 20 BRPM | SYSTOLIC BLOOD PRESSURE: 162 MMHG | WEIGHT: 221 LBS | DIASTOLIC BLOOD PRESSURE: 88 MMHG | BODY MASS INDEX: 29.93 KG/M2 | HEART RATE: 65 BPM | TEMPERATURE: 97.2 F

## 2024-04-24 DIAGNOSIS — E55.9 VITAMIN D DEFICIENCY, UNSPECIFIED: ICD-10-CM

## 2024-04-24 DIAGNOSIS — I10 ESSENTIAL (PRIMARY) HYPERTENSION: ICD-10-CM

## 2024-04-24 DIAGNOSIS — I10 ESSENTIAL (PRIMARY) HYPERTENSION: Primary | ICD-10-CM

## 2024-04-24 DIAGNOSIS — J01.90 ACUTE NON-RECURRENT SINUSITIS, UNSPECIFIED LOCATION: ICD-10-CM

## 2024-04-24 DIAGNOSIS — R73.03 PREDIABETES: Primary | ICD-10-CM

## 2024-04-24 DIAGNOSIS — E78.5 DYSLIPIDEMIA: ICD-10-CM

## 2024-04-24 PROCEDURE — 3077F SYST BP >= 140 MM HG: CPT | Performed by: INTERNAL MEDICINE

## 2024-04-24 PROCEDURE — 1123F ACP DISCUSS/DSCN MKR DOCD: CPT | Performed by: INTERNAL MEDICINE

## 2024-04-24 PROCEDURE — 99213 OFFICE O/P EST LOW 20 MIN: CPT | Performed by: INTERNAL MEDICINE

## 2024-04-24 PROCEDURE — 3079F DIAST BP 80-89 MM HG: CPT | Performed by: INTERNAL MEDICINE

## 2024-04-24 RX ORDER — VALSARTAN 320 MG/1
320 TABLET ORAL DAILY
Qty: 90 TABLET | Refills: 1 | COMMUNITY
Start: 2024-04-24

## 2024-04-24 RX ORDER — AMOXICILLIN 875 MG/1
875 TABLET, COATED ORAL 2 TIMES DAILY
Qty: 14 TABLET | Refills: 0 | Status: SHIPPED | OUTPATIENT
Start: 2024-04-24 | End: 2024-05-01

## 2024-04-24 NOTE — PROGRESS NOTES
Everardo Acharya Sr. presents today for   Chief Complaint   Patient presents with    Blood Pressure Check     4 week follow up     Cough     X 1 week      Patient would like a prescription for his cough.  Patient states he has tried everything over the counter.       \"Have you been to the ER, urgent care clinic since your last visit?  Hospitalized since your last visit?\"    NO    “Have you seen or consulted any other health care providers outside of [facility] since your last visit?”    NO             
DAILY    acetaminophen (TYLENOL) 500 MG tablet Take by mouth every 6 hours as needed    aspirin 81 MG chewable tablet Take 1 tablet by mouth once a week    flecainide (TAMBOCOR) 50 MG tablet 1 tablet 2 times daily    tadalafil (CIALIS) 20 MG tablet Take 1 tablet by mouth daily as needed for Erectile Dysfunction (Patient not taking: Reported on 4/24/2024)     No current facility-administered medications for this visit.                 An electronic signature was used to authenticate this note.    --MARIA ISABEL DOBBINS MD

## 2024-07-17 ENCOUNTER — HOSPITAL ENCOUNTER (OUTPATIENT)
Facility: HOSPITAL | Age: 69
Setting detail: SPECIMEN
Discharge: HOME | End: 2024-07-20
Payer: COMMERCIAL

## 2024-07-17 DIAGNOSIS — E55.9 VITAMIN D DEFICIENCY, UNSPECIFIED: ICD-10-CM

## 2024-07-17 DIAGNOSIS — R73.03 PREDIABETES: ICD-10-CM

## 2024-07-17 DIAGNOSIS — E78.5 DYSLIPIDEMIA: ICD-10-CM

## 2024-07-17 DIAGNOSIS — I10 ESSENTIAL (PRIMARY) HYPERTENSION: ICD-10-CM

## 2024-07-17 LAB
25(OH)D3 SERPL-MCNC: 32.3 NG/ML (ref 30–100)
ALBUMIN SERPL-MCNC: 3.7 G/DL (ref 3.4–5)
ALBUMIN/GLOB SERPL: 1 (ref 0.8–1.7)
ALP SERPL-CCNC: 91 U/L (ref 45–117)
ALT SERPL-CCNC: 52 U/L (ref 16–61)
ANION GAP SERPL CALC-SCNC: 2 MMOL/L (ref 3–18)
AST SERPL-CCNC: 27 U/L (ref 10–38)
BASOPHILS # BLD: 0 K/UL (ref 0–0.1)
BASOPHILS NFR BLD: 1 % (ref 0–2)
BILIRUB SERPL-MCNC: 0.3 MG/DL (ref 0.2–1)
BUN SERPL-MCNC: 12 MG/DL (ref 7–18)
BUN/CREAT SERPL: 12 (ref 12–20)
CALCIUM SERPL-MCNC: 10.5 MG/DL (ref 8.5–10.1)
CHLORIDE SERPL-SCNC: 106 MMOL/L (ref 100–111)
CHOLEST SERPL-MCNC: 175 MG/DL
CO2 SERPL-SCNC: 29 MMOL/L (ref 21–32)
CREAT SERPL-MCNC: 1.03 MG/DL (ref 0.6–1.3)
DIFFERENTIAL METHOD BLD: ABNORMAL
EOSINOPHIL # BLD: 0.2 K/UL (ref 0–0.4)
EOSINOPHIL NFR BLD: 5 % (ref 0–5)
ERYTHROCYTE [DISTWIDTH] IN BLOOD BY AUTOMATED COUNT: 13.6 % (ref 11.6–14.5)
EST. AVERAGE GLUCOSE BLD GHB EST-MCNC: 123 MG/DL
GLOBULIN SER CALC-MCNC: 3.8 G/DL (ref 2–4)
GLUCOSE SERPL-MCNC: 100 MG/DL (ref 74–99)
HBA1C MFR BLD: 5.9 % (ref 4.2–5.6)
HCT VFR BLD AUTO: 44.5 % (ref 36–48)
HDLC SERPL-MCNC: 41 MG/DL (ref 40–60)
HDLC SERPL: 4.3 (ref 0–5)
HGB BLD-MCNC: 14.2 G/DL (ref 13–16)
IMM GRANULOCYTES # BLD AUTO: 0 K/UL (ref 0–0.04)
IMM GRANULOCYTES NFR BLD AUTO: 0 % (ref 0–0.5)
LDLC SERPL CALC-MCNC: 114.2 MG/DL (ref 0–100)
LIPID PANEL: ABNORMAL
LYMPHOCYTES # BLD: 1.2 K/UL (ref 0.9–3.6)
LYMPHOCYTES NFR BLD: 30 % (ref 21–52)
MCH RBC QN AUTO: 27.3 PG (ref 24–34)
MCHC RBC AUTO-ENTMCNC: 31.9 G/DL (ref 31–37)
MCV RBC AUTO: 85.4 FL (ref 78–100)
MONOCYTES # BLD: 0.7 K/UL (ref 0.05–1.2)
MONOCYTES NFR BLD: 16 % (ref 3–10)
NEUTS SEG # BLD: 1.9 K/UL (ref 1.8–8)
NEUTS SEG NFR BLD: 48 % (ref 40–73)
NRBC # BLD: 0 K/UL (ref 0–0.01)
NRBC BLD-RTO: 0 PER 100 WBC
PLATELET # BLD AUTO: 273 K/UL (ref 135–420)
PMV BLD AUTO: 10.3 FL (ref 9.2–11.8)
POTASSIUM SERPL-SCNC: 4.3 MMOL/L (ref 3.5–5.5)
PROT SERPL-MCNC: 7.5 G/DL (ref 6.4–8.2)
RBC # BLD AUTO: 5.21 M/UL (ref 4.35–5.65)
SODIUM SERPL-SCNC: 137 MMOL/L (ref 136–145)
TRIGL SERPL-MCNC: 99 MG/DL
VLDLC SERPL CALC-MCNC: 19.8 MG/DL
WBC # BLD AUTO: 4.1 K/UL (ref 4.6–13.2)

## 2024-07-17 PROCEDURE — 80053 COMPREHEN METABOLIC PANEL: CPT

## 2024-07-17 PROCEDURE — 85025 COMPLETE CBC W/AUTO DIFF WBC: CPT

## 2024-07-17 PROCEDURE — 36415 COLL VENOUS BLD VENIPUNCTURE: CPT

## 2024-07-17 PROCEDURE — 83036 HEMOGLOBIN GLYCOSYLATED A1C: CPT

## 2024-07-17 PROCEDURE — 82306 VITAMIN D 25 HYDROXY: CPT

## 2024-07-17 PROCEDURE — 80061 LIPID PANEL: CPT

## 2024-07-24 ENCOUNTER — OFFICE VISIT (OUTPATIENT)
Facility: CLINIC | Age: 69
End: 2024-07-24
Payer: COMMERCIAL

## 2024-07-24 VITALS
TEMPERATURE: 98.5 F | SYSTOLIC BLOOD PRESSURE: 160 MMHG | HEART RATE: 82 BPM | HEIGHT: 72 IN | DIASTOLIC BLOOD PRESSURE: 91 MMHG | RESPIRATION RATE: 20 BRPM | BODY MASS INDEX: 29.26 KG/M2 | WEIGHT: 216 LBS | OXYGEN SATURATION: 99 %

## 2024-07-24 DIAGNOSIS — E78.5 DYSLIPIDEMIA: ICD-10-CM

## 2024-07-24 DIAGNOSIS — J02.9 ACUTE PHARYNGITIS, UNSPECIFIED ETIOLOGY: ICD-10-CM

## 2024-07-24 DIAGNOSIS — R05.1 ACUTE COUGH: ICD-10-CM

## 2024-07-24 DIAGNOSIS — D57.3 SICKLE CELL TRAIT (HCC): ICD-10-CM

## 2024-07-24 DIAGNOSIS — I10 ESSENTIAL (PRIMARY) HYPERTENSION: Primary | ICD-10-CM

## 2024-07-24 DIAGNOSIS — I48.11 LONGSTANDING PERSISTENT ATRIAL FIBRILLATION (HCC): ICD-10-CM

## 2024-07-24 DIAGNOSIS — R73.03 PREDIABETES: ICD-10-CM

## 2024-07-24 PROCEDURE — 3079F DIAST BP 80-89 MM HG: CPT | Performed by: INTERNAL MEDICINE

## 2024-07-24 PROCEDURE — 99214 OFFICE O/P EST MOD 30 MIN: CPT | Performed by: INTERNAL MEDICINE

## 2024-07-24 PROCEDURE — 1123F ACP DISCUSS/DSCN MKR DOCD: CPT | Performed by: INTERNAL MEDICINE

## 2024-07-24 PROCEDURE — 3077F SYST BP >= 140 MM HG: CPT | Performed by: INTERNAL MEDICINE

## 2024-07-24 RX ORDER — VALSARTAN 320 MG/1
320 TABLET ORAL DAILY
Qty: 90 TABLET | Refills: 2 | Status: SHIPPED | OUTPATIENT
Start: 2024-07-24

## 2024-07-24 RX ORDER — AZITHROMYCIN 250 MG/1
250 TABLET, FILM COATED ORAL SEE ADMIN INSTRUCTIONS
Qty: 6 TABLET | Refills: 0 | Status: SHIPPED | OUTPATIENT
Start: 2024-07-24 | End: 2024-07-29

## 2024-07-24 RX ORDER — CODEINE PHOSPHATE/GUAIFENESIN 10-100MG/5
5 LIQUID (ML) ORAL 4 TIMES DAILY PRN
Qty: 120 ML | Refills: 0 | Status: SHIPPED | OUTPATIENT
Start: 2024-07-24 | End: 2024-07-31

## 2024-07-24 RX ORDER — HYDRALAZINE HYDROCHLORIDE 50 MG/1
50 TABLET, FILM COATED ORAL 3 TIMES DAILY
Qty: 90 TABLET | Refills: 3 | Status: SHIPPED | OUTPATIENT
Start: 2024-07-24

## 2024-07-24 NOTE — PROGRESS NOTES
Everardo Acharya Sr. is a 68 y.o..  male and presents with Hypertension, I Cholesterol Problem, Blood sugar problem, and Vitamin D Deficiency      SUBJECTIVE:  Pt's HTN is still uncontrolled on 1/2 tabs Cardizem 240 mg which was decreased due to bradycardia.  He is now on valsartan 320 mg but BP still not optimal.  .  [facility] which was stopped by his cardiologist.  Patient may have been taking excessive amount of valsartan but he was advised that 320 mg daily is the maximum dose..  Discussed with him would add hydralazine 50 mg 3 times a day and titrate up as tolerated.  Patient has been evaluated by a sleep specialist and had a recent sleep study done patient cannot tolerate the CPAP machine and currently has untreated CPAP which can keep his blood pressure elevated. .  He will continue to work on trying to lose weight by cutting back starches and sugar in his diet and increasing exercise.  He is followed by Cardiology for his Afib, .  Pt's BPH and LUTS are controlled off medications currently. Pt is followed by urology.   Pt is followed by GI for his Crohn's disease which is fairly stable off mesalamine.  Pt continues to try and cut back the sugar and carbs in his diet to improve his prediabetes. .  Allergies are well controlled on Claritin-D and Flonase and Singulair. Pt hasVit D deficiency that is controlled on vit D 2000 units/day.     The 10-year ASCVD risk score (Sole DK, et al., 2019) is: 26.4% and for his dyslipidemia would recommend a statin . He wants to do a heart Scan before he conisders starting a statin.      Patient is complaining of cough productive of some yellow sputum over the last week which is been slowly improving.  He has used over-the-counter cough medicine with mild improvement.    Patient has a history of sickle cell trait but is asymptomatic.  His hemoglobin is in the normal range    Respiratory ROS: negative

## 2024-07-24 NOTE — PROGRESS NOTES
Everardo Acharya Sr. presents today for   Chief Complaint   Patient presents with    Cholesterol Problem    Hypertension    Blood Sugar Problem     3 month follow up            \"Have you been to the ER, urgent care clinic since your last visit?  Hospitalized since your last visit?\"    NO    “Have you seen or consulted any other health care providers outside of [facility] since your last visit?”    NO

## 2024-08-22 ENCOUNTER — OFFICE VISIT (OUTPATIENT)
Facility: CLINIC | Age: 69
End: 2024-08-22
Payer: COMMERCIAL

## 2024-08-22 VITALS
DIASTOLIC BLOOD PRESSURE: 68 MMHG | OXYGEN SATURATION: 98 % | RESPIRATION RATE: 20 BRPM | SYSTOLIC BLOOD PRESSURE: 138 MMHG | WEIGHT: 215 LBS | HEIGHT: 72 IN | TEMPERATURE: 98.6 F | HEART RATE: 77 BPM | BODY MASS INDEX: 29.12 KG/M2

## 2024-08-22 DIAGNOSIS — I10 ESSENTIAL (PRIMARY) HYPERTENSION: Primary | ICD-10-CM

## 2024-08-22 DIAGNOSIS — Z12.5 PROSTATE CANCER SCREENING: ICD-10-CM

## 2024-08-22 DIAGNOSIS — E55.9 VITAMIN D DEFICIENCY, UNSPECIFIED: ICD-10-CM

## 2024-08-22 DIAGNOSIS — K57.90 DIVERTICULOSIS: ICD-10-CM

## 2024-08-22 DIAGNOSIS — E78.5 DYSLIPIDEMIA: ICD-10-CM

## 2024-08-22 DIAGNOSIS — R73.03 PREDIABETES: ICD-10-CM

## 2024-08-22 PROBLEM — F11.20 OPIOID DEPENDENCE WITH CURRENT USE (HCC): Status: RESOLVED | Noted: 2023-04-30 | Resolved: 2024-08-22

## 2024-08-22 PROCEDURE — 3078F DIAST BP <80 MM HG: CPT | Performed by: INTERNAL MEDICINE

## 2024-08-22 PROCEDURE — 3075F SYST BP GE 130 - 139MM HG: CPT | Performed by: INTERNAL MEDICINE

## 2024-08-22 PROCEDURE — 1123F ACP DISCUSS/DSCN MKR DOCD: CPT | Performed by: INTERNAL MEDICINE

## 2024-08-22 PROCEDURE — 99213 OFFICE O/P EST LOW 20 MIN: CPT | Performed by: INTERNAL MEDICINE

## 2024-08-22 NOTE — PROGRESS NOTES
Everardo Acharya Sr. presents today for   Chief Complaint   Patient presents with    Blood Pressure Check     4 week follow up            \"Have you been to the ER, urgent care clinic since your last visit?  Hospitalized since your last visit?\"    NO    “Have you seen or consulted any other health care providers outside of [facility] since your last visit?”    NO

## 2024-08-22 NOTE — PROGRESS NOTES
Everardo Acharya . (:  1955) is a 68 y.o. male established patient, here for evaluation of the following chief complaint(s):  Blood Pressure Check (4 week follow up )      Assessment & Plan   ASSESSMENT/PLAN:    ICD-10-CM    1. Essential (primary) hypertension  I10 Better control especially at home on Diovan 320 mg daily and Cardizem  mg daily.  Patient will increase physical exercise but if blood pressure not improving at next office visit would add hydralazine      2. Diverticulosis  K57.90 Patient to increase fiber in his diet and if he continues to have recurrent left lower quadrant pain will refer back to GI for possible repeat colonoscopy             Return in about 3 months (around 2024) for labs 1 week before.         Subjective   SUBJECTIVE/OBJECTIVE:  Patient's high blood pressure is better controlled, especially with home readings unjust Cardizem  mg and Diovan 320 mg daily.  Patient never tried to hydralazine because he was having other issues with constipation and abdominal pain and did not want to introduce other factors that could exacerbate it.  At this point in time he wants to just work on increasing his physical exercise to see if he can bring his blood pressure down without adding more blood pressure medications.  Would again consider adding hydralazine if blood pressure is not optimal in 3 months.  Patient also has a history of recurrent left lower quadrant pain and had a CAT scan of his abdomen in  that showed scattered diverticulosis.  He has exacerbation of the left lower quadrant pain when he has issues with constipation which was probably exacerbated by recent use of cough medicine with codeine.  Patient will try and increase his fiber intake and if he continues to have recurrent left lower quadrant pain would consider referring back to GI for another colonoscopy.    Respiratory ROS: negative for - shortness of breath  Cardiovascular ROS: negative for -

## 2024-09-19 ENCOUNTER — OFFICE VISIT (OUTPATIENT)
Facility: CLINIC | Age: 69
End: 2024-09-19
Payer: COMMERCIAL

## 2024-09-19 VITALS
HEART RATE: 71 BPM | SYSTOLIC BLOOD PRESSURE: 131 MMHG | BODY MASS INDEX: 28.99 KG/M2 | WEIGHT: 214 LBS | RESPIRATION RATE: 20 BRPM | DIASTOLIC BLOOD PRESSURE: 74 MMHG | TEMPERATURE: 98.7 F | HEIGHT: 72 IN | OXYGEN SATURATION: 97 %

## 2024-09-19 DIAGNOSIS — J01.90 ACUTE NON-RECURRENT SINUSITIS, UNSPECIFIED LOCATION: Primary | ICD-10-CM

## 2024-09-19 DIAGNOSIS — I10 ESSENTIAL (PRIMARY) HYPERTENSION: ICD-10-CM

## 2024-09-19 PROCEDURE — 1123F ACP DISCUSS/DSCN MKR DOCD: CPT | Performed by: INTERNAL MEDICINE

## 2024-09-19 PROCEDURE — 99213 OFFICE O/P EST LOW 20 MIN: CPT | Performed by: INTERNAL MEDICINE

## 2024-09-19 PROCEDURE — 3075F SYST BP GE 130 - 139MM HG: CPT | Performed by: INTERNAL MEDICINE

## 2024-09-19 PROCEDURE — 3078F DIAST BP <80 MM HG: CPT | Performed by: INTERNAL MEDICINE

## 2024-09-19 RX ORDER — AZITHROMYCIN 250 MG/1
250 TABLET, FILM COATED ORAL SEE ADMIN INSTRUCTIONS
Qty: 6 TABLET | Refills: 0 | Status: SHIPPED | OUTPATIENT
Start: 2024-09-19 | End: 2024-09-24

## 2024-09-19 RX ORDER — DILTIAZEM HYDROCHLORIDE 240 MG/1
240 CAPSULE, COATED, EXTENDED RELEASE ORAL DAILY
Qty: 90 CAPSULE | Refills: 3 | Status: SHIPPED | OUTPATIENT
Start: 2024-09-19

## 2024-11-13 ENCOUNTER — HOSPITAL ENCOUNTER (OUTPATIENT)
Facility: HOSPITAL | Age: 69
Setting detail: SPECIMEN
Discharge: HOME | End: 2024-11-16
Payer: COMMERCIAL

## 2024-11-13 DIAGNOSIS — I10 ESSENTIAL (PRIMARY) HYPERTENSION: ICD-10-CM

## 2024-11-13 DIAGNOSIS — E55.9 VITAMIN D DEFICIENCY, UNSPECIFIED: ICD-10-CM

## 2024-11-13 DIAGNOSIS — Z12.5 PROSTATE CANCER SCREENING: ICD-10-CM

## 2024-11-13 DIAGNOSIS — R73.03 PREDIABETES: ICD-10-CM

## 2024-11-13 DIAGNOSIS — E78.5 DYSLIPIDEMIA: ICD-10-CM

## 2024-11-13 LAB
25(OH)D3 SERPL-MCNC: 25.1 NG/ML (ref 30–100)
ALBUMIN SERPL-MCNC: 3.4 G/DL (ref 3.4–5)
ALBUMIN/GLOB SERPL: 0.9 (ref 0.8–1.7)
ALP SERPL-CCNC: 91 U/L (ref 45–117)
ALT SERPL-CCNC: 30 U/L (ref 16–61)
ANION GAP SERPL CALC-SCNC: 5 MMOL/L (ref 3–18)
AST SERPL-CCNC: 18 U/L (ref 10–38)
BILIRUB SERPL-MCNC: 0.5 MG/DL (ref 0.2–1)
BUN SERPL-MCNC: 14 MG/DL (ref 7–18)
BUN/CREAT SERPL: 13 (ref 12–20)
CALCIUM SERPL-MCNC: 10.6 MG/DL (ref 8.5–10.1)
CHLORIDE SERPL-SCNC: 108 MMOL/L (ref 100–111)
CHOLEST SERPL-MCNC: 168 MG/DL
CO2 SERPL-SCNC: 27 MMOL/L (ref 21–32)
CREAT SERPL-MCNC: 1.06 MG/DL (ref 0.6–1.3)
EST. AVERAGE GLUCOSE BLD GHB EST-MCNC: 120 MG/DL
GLOBULIN SER CALC-MCNC: 3.6 G/DL (ref 2–4)
GLUCOSE SERPL-MCNC: 129 MG/DL (ref 74–99)
HBA1C MFR BLD: 5.8 % (ref 4.2–5.6)
HDLC SERPL-MCNC: 42 MG/DL (ref 40–60)
HDLC SERPL: 4 (ref 0–5)
LDLC SERPL CALC-MCNC: 96 MG/DL (ref 0–100)
LIPID PANEL: ABNORMAL
POTASSIUM SERPL-SCNC: 4.1 MMOL/L (ref 3.5–5.5)
PROT SERPL-MCNC: 7 G/DL (ref 6.4–8.2)
PSA SERPL-MCNC: 1.4 NG/ML (ref 0–4)
SODIUM SERPL-SCNC: 140 MMOL/L (ref 136–145)
TRIGL SERPL-MCNC: 150 MG/DL
VLDLC SERPL CALC-MCNC: 30 MG/DL

## 2024-11-13 PROCEDURE — 80053 COMPREHEN METABOLIC PANEL: CPT

## 2024-11-13 PROCEDURE — G0103 PSA SCREENING: HCPCS

## 2024-11-13 PROCEDURE — 82306 VITAMIN D 25 HYDROXY: CPT

## 2024-11-13 PROCEDURE — 36415 COLL VENOUS BLD VENIPUNCTURE: CPT

## 2024-11-13 PROCEDURE — 83036 HEMOGLOBIN GLYCOSYLATED A1C: CPT

## 2024-11-13 PROCEDURE — 80061 LIPID PANEL: CPT

## 2024-11-20 ENCOUNTER — OFFICE VISIT (OUTPATIENT)
Facility: CLINIC | Age: 69
End: 2024-11-20
Payer: COMMERCIAL

## 2024-11-20 VITALS
BODY MASS INDEX: 29.12 KG/M2 | WEIGHT: 215 LBS | TEMPERATURE: 97.7 F | RESPIRATION RATE: 16 BRPM | HEART RATE: 58 BPM | HEIGHT: 72 IN | SYSTOLIC BLOOD PRESSURE: 156 MMHG | OXYGEN SATURATION: 98 % | DIASTOLIC BLOOD PRESSURE: 92 MMHG

## 2024-11-20 DIAGNOSIS — E78.5 DYSLIPIDEMIA: ICD-10-CM

## 2024-11-20 DIAGNOSIS — I10 ESSENTIAL (PRIMARY) HYPERTENSION: Primary | ICD-10-CM

## 2024-11-20 DIAGNOSIS — R73.03 PREDIABETES: ICD-10-CM

## 2024-11-20 DIAGNOSIS — E55.9 VITAMIN D DEFICIENCY: ICD-10-CM

## 2024-11-20 PROCEDURE — 3077F SYST BP >= 140 MM HG: CPT | Performed by: INTERNAL MEDICINE

## 2024-11-20 PROCEDURE — 99214 OFFICE O/P EST MOD 30 MIN: CPT | Performed by: INTERNAL MEDICINE

## 2024-11-20 PROCEDURE — 3078F DIAST BP <80 MM HG: CPT | Performed by: INTERNAL MEDICINE

## 2024-11-20 PROCEDURE — 1123F ACP DISCUSS/DSCN MKR DOCD: CPT | Performed by: INTERNAL MEDICINE

## 2024-11-20 RX ORDER — FLUTICASONE PROPIONATE 50 MCG
2 SPRAY, SUSPENSION (ML) NASAL DAILY
Qty: 1 EACH | Refills: 5 | Status: SHIPPED | OUTPATIENT
Start: 2024-11-20

## 2024-11-20 RX ORDER — VALSARTAN 320 MG/1
320 TABLET ORAL DAILY
Qty: 90 TABLET | Refills: 2 | Status: SHIPPED | OUTPATIENT
Start: 2024-11-20

## 2024-11-20 NOTE — PROGRESS NOTES
Everardo Acharya Sr. presents today for   Chief Complaint   Patient presents with    Hypertension     3 month follow up            \"Have you been to the ER, urgent care clinic since your last visit?  Hospitalized since your last visit?\"    NO    “Have you seen or consulted any other health care providers outside of [facility] since your last visit?”    Yes, [facility]              
Results   Component Value Date/Time    PSA 1.4 11/13/2024 11:36 AM       A/P      ICD-10-CM    1. Essential (primary) hypertension  I10 Borderline controlled on valsartan (DIOVAN) 320 MG tablet Cardizem  mg daily.  Patient will try and improve further with weight loss and exercise since he has been intolerant to multiple other blood pressure medicines     Comprehensive Metabolic Panel      2. Dyslipidemia  E78.5 Uncontrolled and statin has been recommended but patient prefers to do CT CARDIAC CALCIUM SCORING before considering a statin     Lipid Panel      3. Prediabetes  R73.03 Improving with diet and weight loss Hemoglobin A1C      4. Vitamin D deficiency  E55.9 Uncontrolled patient to take vitamin D 2000's per day on a regular basis.  Vitamin D 25 Hydroxy                      Follow-up and Dispositions    Return in about 4 months (around 3/20/2025) for labs 1 week before.           Reviewed plan of care. Patient has provided input and agrees with goals.

## 2025-03-12 ENCOUNTER — HOSPITAL ENCOUNTER (OUTPATIENT)
Facility: HOSPITAL | Age: 70
Setting detail: SPECIMEN
Discharge: HOME | End: 2025-03-15
Payer: COMMERCIAL

## 2025-03-12 DIAGNOSIS — R73.03 PREDIABETES: ICD-10-CM

## 2025-03-12 DIAGNOSIS — E55.9 VITAMIN D DEFICIENCY: ICD-10-CM

## 2025-03-12 DIAGNOSIS — I10 ESSENTIAL (PRIMARY) HYPERTENSION: ICD-10-CM

## 2025-03-12 DIAGNOSIS — E78.5 DYSLIPIDEMIA: ICD-10-CM

## 2025-03-12 LAB
25(OH)D3 SERPL-MCNC: 31 NG/ML (ref 30–100)
ALBUMIN SERPL-MCNC: 3.5 G/DL (ref 3.4–5)
ALBUMIN/GLOB SERPL: 1 (ref 0.8–1.7)
ALP SERPL-CCNC: 89 U/L (ref 45–117)
ALT SERPL-CCNC: 39 U/L (ref 16–61)
ANION GAP SERPL CALC-SCNC: 5 MMOL/L (ref 3–18)
AST SERPL-CCNC: 23 U/L (ref 10–38)
BILIRUB SERPL-MCNC: 1.2 MG/DL (ref 0.2–1)
BUN SERPL-MCNC: 12 MG/DL (ref 7–18)
BUN/CREAT SERPL: 11 (ref 12–20)
CALCIUM SERPL-MCNC: 9.9 MG/DL (ref 8.5–10.1)
CHLORIDE SERPL-SCNC: 108 MMOL/L (ref 100–111)
CHOLEST SERPL-MCNC: 162 MG/DL
CO2 SERPL-SCNC: 27 MMOL/L (ref 21–32)
CREAT SERPL-MCNC: 1.11 MG/DL (ref 0.6–1.3)
EST. AVERAGE GLUCOSE BLD GHB EST-MCNC: 134 MG/DL
GLOBULIN SER CALC-MCNC: 3.5 G/DL (ref 2–4)
GLUCOSE SERPL-MCNC: 110 MG/DL (ref 74–99)
HBA1C MFR BLD: 6.3 % (ref 4.2–5.6)
HDLC SERPL-MCNC: 38 MG/DL (ref 40–60)
HDLC SERPL: 4.3 (ref 0–5)
LDLC SERPL CALC-MCNC: 71.8 MG/DL (ref 0–100)
LIPID PANEL: ABNORMAL
POTASSIUM SERPL-SCNC: 4 MMOL/L (ref 3.5–5.5)
PROT SERPL-MCNC: 7 G/DL (ref 6.4–8.2)
SODIUM SERPL-SCNC: 140 MMOL/L (ref 136–145)
TRIGL SERPL-MCNC: 261 MG/DL
VLDLC SERPL CALC-MCNC: 52.2 MG/DL

## 2025-03-12 PROCEDURE — 82306 VITAMIN D 25 HYDROXY: CPT

## 2025-03-12 PROCEDURE — 36415 COLL VENOUS BLD VENIPUNCTURE: CPT

## 2025-03-12 PROCEDURE — 80053 COMPREHEN METABOLIC PANEL: CPT

## 2025-03-12 PROCEDURE — 83036 HEMOGLOBIN GLYCOSYLATED A1C: CPT

## 2025-03-12 PROCEDURE — 80061 LIPID PANEL: CPT

## 2025-03-26 ENCOUNTER — OFFICE VISIT (OUTPATIENT)
Facility: CLINIC | Age: 70
End: 2025-03-26
Payer: COMMERCIAL

## 2025-03-26 VITALS
WEIGHT: 228 LBS | HEART RATE: 63 BPM | HEIGHT: 72 IN | BODY MASS INDEX: 30.88 KG/M2 | TEMPERATURE: 98.1 F | SYSTOLIC BLOOD PRESSURE: 157 MMHG | DIASTOLIC BLOOD PRESSURE: 87 MMHG | OXYGEN SATURATION: 97 % | RESPIRATION RATE: 20 BRPM

## 2025-03-26 DIAGNOSIS — K50.10 CROHN'S DISEASE OF LARGE INTESTINE WITHOUT COMPLICATIONS (HCC): ICD-10-CM

## 2025-03-26 DIAGNOSIS — I10 ESSENTIAL (PRIMARY) HYPERTENSION: Primary | ICD-10-CM

## 2025-03-26 DIAGNOSIS — M54.41 ACUTE RIGHT-SIDED LOW BACK PAIN WITH RIGHT-SIDED SCIATICA: ICD-10-CM

## 2025-03-26 DIAGNOSIS — E78.5 DYSLIPIDEMIA: ICD-10-CM

## 2025-03-26 DIAGNOSIS — R73.03 PREDIABETES: ICD-10-CM

## 2025-03-26 DIAGNOSIS — E55.9 VITAMIN D DEFICIENCY: ICD-10-CM

## 2025-03-26 DIAGNOSIS — I48.11 LONGSTANDING PERSISTENT ATRIAL FIBRILLATION (HCC): ICD-10-CM

## 2025-03-26 PROCEDURE — 99214 OFFICE O/P EST MOD 30 MIN: CPT | Performed by: INTERNAL MEDICINE

## 2025-03-26 PROCEDURE — 1123F ACP DISCUSS/DSCN MKR DOCD: CPT | Performed by: INTERNAL MEDICINE

## 2025-03-26 PROCEDURE — 3079F DIAST BP 80-89 MM HG: CPT | Performed by: INTERNAL MEDICINE

## 2025-03-26 PROCEDURE — 3077F SYST BP >= 140 MM HG: CPT | Performed by: INTERNAL MEDICINE

## 2025-03-26 RX ORDER — FLUTICASONE PROPIONATE 50 MCG
2 SPRAY, SUSPENSION (ML) NASAL DAILY
Qty: 1 EACH | Refills: 5 | Status: SHIPPED | OUTPATIENT
Start: 2025-03-26

## 2025-03-26 RX ORDER — PREDNISONE 10 MG/1
TABLET ORAL
Qty: 1 EACH | Refills: 0 | Status: SHIPPED | OUTPATIENT
Start: 2025-03-26

## 2025-03-26 SDOH — ECONOMIC STABILITY: FOOD INSECURITY: WITHIN THE PAST 12 MONTHS, THE FOOD YOU BOUGHT JUST DIDN'T LAST AND YOU DIDN'T HAVE MONEY TO GET MORE.: NEVER TRUE

## 2025-03-26 SDOH — ECONOMIC STABILITY: FOOD INSECURITY: WITHIN THE PAST 12 MONTHS, YOU WORRIED THAT YOUR FOOD WOULD RUN OUT BEFORE YOU GOT MONEY TO BUY MORE.: NEVER TRUE

## 2025-03-26 ASSESSMENT — PATIENT HEALTH QUESTIONNAIRE - PHQ9
SUM OF ALL RESPONSES TO PHQ QUESTIONS 1-9: 0
2. FEELING DOWN, DEPRESSED OR HOPELESS: NOT AT ALL
SUM OF ALL RESPONSES TO PHQ QUESTIONS 1-9: 0
SUM OF ALL RESPONSES TO PHQ QUESTIONS 1-9: 0
1. LITTLE INTEREST OR PLEASURE IN DOING THINGS: NOT AT ALL
SUM OF ALL RESPONSES TO PHQ QUESTIONS 1-9: 0

## 2025-03-26 NOTE — PROGRESS NOTES
Everardo Acharya Sr. presents today for   Chief Complaint   Patient presents with    Hypertension    Cholesterol Problem    Blood Sugar Problem     4 month follow up     Back Pain           \"Have you been to the ER, urgent care clinic since your last visit?  Hospitalized since your last visit?\"    NO    “Have you seen or consulted any other health care providers outside of [facility] since your last visit?”    NO               
negative for - chest pain    Current Outpatient Medications   Medication Sig    fluticasone (FLONASE) 50 MCG/ACT nasal spray 2 sprays by Each Nostril route daily    predniSONE 10 MG (21) TBPK Use as directed    tamsulosin (FLOMAX) 0.4 MG capsule Take 1 capsule by mouth at bedtime    ofloxacin (OCUFLOX) 0.3 % solution INSTILL 1 DROP INTO EACH EYE 4 TIMES DAILY FOR 7 DAYS    tadalafil (CIALIS) 20 MG tablet Take 1 tablet by mouth daily as needed for Erectile Dysfunction    valsartan (DIOVAN) 320 MG tablet Take 1 tablet by mouth daily    dilTIAZem (CARDIZEM CD) 240 MG extended release capsule Take 1 capsule by mouth daily    tadalafil (CIALIS) 5 MG tablet Take 1 tablet by mouth daily    acetaminophen (TYLENOL) 500 MG tablet Take by mouth every 6 hours as needed    aspirin 81 MG chewable tablet Take 1 tablet by mouth once a week    flecainide (TAMBOCOR) 50 MG tablet 1 tablet 2 times daily    guaiFENesin (MUCINEX) 600 MG extended release tablet Take 1 tablet by mouth in the morning and 1 tablet in the evening. (Patient not taking: Reported on 3/26/2025)     No current facility-administered medications for this visit.             OBJECTIVE:  alert, well appearing, and in no distress  Visit Vitals  BP (!) 157/87 (BP Site: Right Upper Arm, Patient Position: Sitting, BP Cuff Size: Large Adult)   Pulse 63   Temp 98.1 °F (36.7 °C) (Temporal)   Resp 20   Ht 1.829 m (6')   Wt 103.4 kg (228 lb)   SpO2 97%   BMI 30.92 kg/m²       well developed and well nourished  Chest - clear to auscultation, no wheezes, rales or rhonchi, symmetric air entry  Heart - normal rate, regular rhythm, normal S1, S2, no murmurs, rubs, clicks or gallops  Extremities - peripheral pulses normal, no pedal edema, no clubbing or cyanosis  Back-paraspinal muscle tenderness in the lumbar area to palpation.  Positive right straight leg raise  CMP:    Lab Results   Component Value Date/Time     03/12/2025 12:53 PM    K 4.0 03/12/2025 12:53 PM

## 2025-05-23 ENCOUNTER — TRANSCRIBE ORDERS (OUTPATIENT)
Facility: HOSPITAL | Age: 70
End: 2025-05-23

## 2025-05-23 DIAGNOSIS — R10.32 LEFT LOWER QUADRANT ABDOMINAL PAIN: ICD-10-CM

## 2025-05-23 DIAGNOSIS — Z87.19 HISTORY OF DIVERTICULITIS: ICD-10-CM

## 2025-05-23 DIAGNOSIS — R19.5 LOOSE STOOLS: ICD-10-CM

## 2025-05-23 DIAGNOSIS — Z86.0101 PERSONAL HISTORY OF ADENOMATOUS AND SERRATED COLON POLYPS: ICD-10-CM

## 2025-05-23 DIAGNOSIS — R19.5 CHANGE IN STOOL: Primary | ICD-10-CM

## 2025-06-05 ENCOUNTER — HOSPITAL ENCOUNTER (OUTPATIENT)
Age: 70
Discharge: HOME | End: 2025-06-08
Payer: COMMERCIAL

## 2025-06-05 DIAGNOSIS — R19.5 LOOSE STOOLS: ICD-10-CM

## 2025-06-05 DIAGNOSIS — R10.32 LEFT LOWER QUADRANT ABDOMINAL PAIN: ICD-10-CM

## 2025-06-05 DIAGNOSIS — R19.5 CHANGE IN STOOL: ICD-10-CM

## 2025-06-05 DIAGNOSIS — Z86.0101 PERSONAL HISTORY OF ADENOMATOUS AND SERRATED COLON POLYPS: ICD-10-CM

## 2025-06-05 DIAGNOSIS — Z87.19 HISTORY OF DIVERTICULITIS: ICD-10-CM

## 2025-06-05 LAB — CREAT UR-MCNC: 1.1 MG/DL (ref 0.6–1.3)

## 2025-06-05 PROCEDURE — 6360000004 HC RX CONTRAST MEDICATION: Performed by: NURSE PRACTITIONER

## 2025-06-05 PROCEDURE — 82565 ASSAY OF CREATININE: CPT

## 2025-06-05 PROCEDURE — 74177 CT ABD & PELVIS W/CONTRAST: CPT

## 2025-06-05 RX ORDER — IOPAMIDOL 612 MG/ML
100 INJECTION, SOLUTION INTRAVASCULAR
Status: COMPLETED | OUTPATIENT
Start: 2025-06-05 | End: 2025-06-05

## 2025-06-05 RX ORDER — DIATRIZOATE MEGLUMINE AND DIATRIZOATE SODIUM 660; 100 MG/ML; MG/ML
30 SOLUTION ORAL; RECTAL
Status: DISCONTINUED | OUTPATIENT
Start: 2025-06-05 | End: 2025-06-09 | Stop reason: HOSPADM

## 2025-06-05 RX ADMIN — IOPAMIDOL 100 ML: 612 INJECTION, SOLUTION INTRAVENOUS at 11:44

## 2025-06-05 RX ADMIN — DIATRIZOATE MEGLUMINE AND DIATRIZOATE SODIUM 30 ML: 660; 100 LIQUID ORAL; RECTAL at 11:23

## 2025-07-23 ENCOUNTER — HOSPITAL ENCOUNTER (OUTPATIENT)
Facility: HOSPITAL | Age: 70
Setting detail: SPECIMEN
Discharge: HOME | End: 2025-07-26
Payer: COMMERCIAL

## 2025-07-23 DIAGNOSIS — E78.5 DYSLIPIDEMIA: ICD-10-CM

## 2025-07-23 DIAGNOSIS — I10 ESSENTIAL (PRIMARY) HYPERTENSION: ICD-10-CM

## 2025-07-23 DIAGNOSIS — E55.9 VITAMIN D DEFICIENCY: ICD-10-CM

## 2025-07-23 DIAGNOSIS — R73.03 PREDIABETES: ICD-10-CM

## 2025-07-23 LAB
25(OH)D3 SERPL-MCNC: 26.6 NG/ML (ref 30–100)
ALBUMIN SERPL-MCNC: 3.5 G/DL (ref 3.4–5)
ALBUMIN/GLOB SERPL: 1.1 (ref 0.8–1.7)
ALP SERPL-CCNC: 103 U/L (ref 45–117)
ALT SERPL-CCNC: 30 U/L (ref 10–50)
ANION GAP SERPL CALC-SCNC: 9 MMOL/L (ref 3–18)
AST SERPL-CCNC: 24 U/L (ref 10–38)
BILIRUB SERPL-MCNC: 0.3 MG/DL (ref 0.2–1)
BUN SERPL-MCNC: 11 MG/DL (ref 6–23)
BUN/CREAT SERPL: 11 (ref 12–20)
CALCIUM SERPL-MCNC: 10.4 MG/DL (ref 8.5–10.1)
CHLORIDE SERPL-SCNC: 105 MMOL/L (ref 98–107)
CHOLEST SERPL-MCNC: 153 MG/DL
CO2 SERPL-SCNC: 26 MMOL/L (ref 21–32)
CREAT SERPL-MCNC: 0.96 MG/DL (ref 0.6–1.3)
EST. AVERAGE GLUCOSE BLD GHB EST-MCNC: 123 MG/DL
GLOBULIN SER CALC-MCNC: 3.3 G/DL (ref 2–4)
GLUCOSE SERPL-MCNC: 109 MG/DL (ref 74–108)
HBA1C MFR BLD: 5.9 % (ref 4.2–5.6)
HDLC SERPL-MCNC: 39 MG/DL (ref 40–60)
HDLC SERPL: 3.9 (ref 0–5)
LDLC SERPL CALC-MCNC: 78 MG/DL (ref 0–100)
POTASSIUM SERPL-SCNC: 4 MMOL/L (ref 3.5–5.5)
PROT SERPL-MCNC: 6.8 G/DL (ref 6.4–8.2)
SODIUM SERPL-SCNC: 140 MMOL/L (ref 136–145)
TRIGL SERPL-MCNC: 180 MG/DL (ref 0–150)
VLDLC SERPL CALC-MCNC: 36 MG/DL

## 2025-07-23 PROCEDURE — 83036 HEMOGLOBIN GLYCOSYLATED A1C: CPT

## 2025-07-23 PROCEDURE — 82306 VITAMIN D 25 HYDROXY: CPT

## 2025-07-23 PROCEDURE — 80061 LIPID PANEL: CPT

## 2025-07-23 PROCEDURE — 80053 COMPREHEN METABOLIC PANEL: CPT

## 2025-07-23 PROCEDURE — 36415 COLL VENOUS BLD VENIPUNCTURE: CPT

## 2025-07-30 ENCOUNTER — OFFICE VISIT (OUTPATIENT)
Facility: CLINIC | Age: 70
End: 2025-07-30
Payer: COMMERCIAL

## 2025-07-30 VITALS
WEIGHT: 222 LBS | OXYGEN SATURATION: 97 % | TEMPERATURE: 97.5 F | SYSTOLIC BLOOD PRESSURE: 141 MMHG | RESPIRATION RATE: 20 BRPM | HEIGHT: 72 IN | HEART RATE: 56 BPM | DIASTOLIC BLOOD PRESSURE: 65 MMHG | BODY MASS INDEX: 30.07 KG/M2

## 2025-07-30 DIAGNOSIS — E78.5 DYSLIPIDEMIA: ICD-10-CM

## 2025-07-30 DIAGNOSIS — E55.9 VITAMIN D DEFICIENCY: ICD-10-CM

## 2025-07-30 DIAGNOSIS — G47.33 OBSTRUCTIVE SLEEP APNEA SYNDROME: ICD-10-CM

## 2025-07-30 DIAGNOSIS — I10 ESSENTIAL (PRIMARY) HYPERTENSION: Primary | ICD-10-CM

## 2025-07-30 DIAGNOSIS — R73.03 PREDIABETES: ICD-10-CM

## 2025-07-30 PROCEDURE — 1123F ACP DISCUSS/DSCN MKR DOCD: CPT | Performed by: INTERNAL MEDICINE

## 2025-07-30 PROCEDURE — 3077F SYST BP >= 140 MM HG: CPT | Performed by: INTERNAL MEDICINE

## 2025-07-30 PROCEDURE — 99214 OFFICE O/P EST MOD 30 MIN: CPT | Performed by: INTERNAL MEDICINE

## 2025-07-30 PROCEDURE — 3078F DIAST BP <80 MM HG: CPT | Performed by: INTERNAL MEDICINE

## 2025-07-30 NOTE — PROGRESS NOTES
Everardo Acharya Sr. presents today for   Chief Complaint   Patient presents with    Hypertension    Cholesterol Problem    Blood Sugar Problem     4 month follow up     Back Pain     Lower back pain.  Patient states he has been taking Tylenol Extra strength which is helping with his pain.           \"Have you been to the ER, urgent care clinic since your last visit?  Hospitalized since your last visit?\"    NO    “Have you seen or consulted any other health care providers outside of [facility] since your last visit?”    NO             
   tamsulosin (FLOMAX) 0.4 MG capsule Take 1 capsule by mouth at bedtime    fluticasone (FLONASE) 50 MCG/ACT nasal spray 2 sprays by Each Nostril route daily    predniSONE 10 MG (21) TBPK Use as directed    ofloxacin (OCUFLOX) 0.3 % solution INSTILL 1 DROP INTO EACH EYE 4 TIMES DAILY FOR 7 DAYS    valsartan (DIOVAN) 320 MG tablet Take 1 tablet by mouth daily    dilTIAZem (CARDIZEM CD) 240 MG extended release capsule Take 1 capsule by mouth daily    guaiFENesin (MUCINEX) 600 MG extended release tablet Take 1 tablet by mouth in the morning and 1 tablet in the evening. (Patient not taking: Reported on 3/26/2025)    tadalafil (CIALIS) 5 MG tablet Take 1 tablet by mouth daily    acetaminophen (TYLENOL) 500 MG tablet Take by mouth every 6 hours as needed    aspirin 81 MG chewable tablet Take 1 tablet by mouth once a week    flecainide (TAMBOCOR) 50 MG tablet 1 tablet 2 times daily     No current facility-administered medications for this visit.             OBJECTIVE:  alert, well appearing, and in no distress  Visit Vitals  BP (!) 141/65 (BP Site: Left Upper Arm, Patient Position: Sitting, BP Cuff Size: Large Adult)   Pulse 56   Temp 97.5 °F (36.4 °C) (Temporal)   Resp 20   Ht 1.829 m (6')   Wt 100.7 kg (222 lb)   SpO2 97%   BMI 30.11 kg/m²       well developed and well nourished  Chest - clear to auscultation, no wheezes, rales or rhonchi, symmetric air entry  Heart - normal rate, regular rhythm, normal S1, S2, no murmurs, rubs, clicks or gallops  Extremities - peripheral pulses normal, no pedal edema, no clubbing or cyanosis    CMP:    Lab Results   Component Value Date/Time     07/23/2025 09:44 AM    K 4.0 07/23/2025 09:44 AM     07/23/2025 09:44 AM    CO2 26 07/23/2025 09:44 AM    BUN 11 07/23/2025 09:44 AM    CREATININE 0.96 07/23/2025 09:44 AM    GFRAA >60 04/12/2022 10:22 AM    AGRATIO 1.1 10/14/2022 08:25 AM    LABGLOM 85 07/23/2025 09:44 AM    LABGLOM >60 03/19/2024 10:30 AM    LABGLOM >60 10/14/2022

## 2025-08-08 DIAGNOSIS — I10 ESSENTIAL (PRIMARY) HYPERTENSION: ICD-10-CM

## 2025-08-08 RX ORDER — VALSARTAN 320 MG/1
320 TABLET ORAL DAILY
Qty: 90 TABLET | Refills: 3 | Status: SHIPPED | OUTPATIENT
Start: 2025-08-08